# Patient Record
Sex: FEMALE | Race: WHITE | NOT HISPANIC OR LATINO | Employment: PART TIME | URBAN - METROPOLITAN AREA
[De-identification: names, ages, dates, MRNs, and addresses within clinical notes are randomized per-mention and may not be internally consistent; named-entity substitution may affect disease eponyms.]

---

## 2017-01-12 ENCOUNTER — TRANSCRIBE ORDERS (OUTPATIENT)
Dept: ADMINISTRATIVE | Facility: HOSPITAL | Age: 67
End: 2017-01-12

## 2017-01-12 DIAGNOSIS — Z12.31 VISIT FOR SCREENING MAMMOGRAM: ICD-10-CM

## 2017-01-12 DIAGNOSIS — Z78.0 POST-MENOPAUSE: Primary | ICD-10-CM

## 2017-03-22 ENCOUNTER — HOSPITAL ENCOUNTER (OUTPATIENT)
Dept: GENERAL RADIOLOGY | Facility: HOSPITAL | Age: 67
Discharge: HOME | End: 2017-03-22
Attending: INTERNAL MEDICINE | Admitting: INTERNAL MEDICINE

## 2017-03-22 ENCOUNTER — TRANSCRIBE ORDERS (OUTPATIENT)
Dept: ADMINISTRATIVE | Facility: HOSPITAL | Age: 67
End: 2017-03-22

## 2017-03-22 DIAGNOSIS — J02.9 PHARYNGITIS, UNSPECIFIED ETIOLOGY: Primary | ICD-10-CM

## 2017-03-22 PROCEDURE — 71020 HC CHEST PA AND LATERAL: CPT

## 2017-08-10 ENCOUNTER — APPOINTMENT (OUTPATIENT)
Dept: OTHER | Facility: HOSPITAL | Age: 67
End: 2017-08-10

## 2017-08-10 ENCOUNTER — APPOINTMENT (OUTPATIENT)
Dept: BONE DENSITY | Facility: HOSPITAL | Age: 67
End: 2017-08-10

## 2017-08-10 ENCOUNTER — HOSPITAL ENCOUNTER (OUTPATIENT)
Dept: MAMMOGRAPHY | Facility: HOSPITAL | Age: 67
Discharge: HOME | End: 2017-08-10
Admitting: PHYSICIAN ASSISTANT

## 2017-08-10 DIAGNOSIS — Z12.31 VISIT FOR SCREENING MAMMOGRAM: ICD-10-CM

## 2017-08-10 PROCEDURE — 77063 BREAST TOMOSYNTHESIS BI: CPT | Performed by: RADIOLOGY

## 2017-08-10 PROCEDURE — G0202 SCR MAMMO BI INCL CAD: HCPCS

## 2017-08-10 PROCEDURE — 77063 BREAST TOMOSYNTHESIS BI: CPT

## 2017-08-10 PROCEDURE — G0202 SCR MAMMO BI INCL CAD: HCPCS | Performed by: RADIOLOGY

## 2018-04-26 DIAGNOSIS — Z12.11 SCREENING FOR COLON CANCER: Primary | ICD-10-CM

## 2018-08-27 ENCOUNTER — TRANSCRIBE ORDERS (OUTPATIENT)
Dept: ADMINISTRATIVE | Facility: HOSPITAL | Age: 68
End: 2018-08-27

## 2018-08-27 DIAGNOSIS — N95.9 MENOPAUSAL AND POSTMENOPAUSAL DISORDER: Primary | ICD-10-CM

## 2018-09-20 ENCOUNTER — TRANSCRIBE ORDERS (OUTPATIENT)
Dept: ADMINISTRATIVE | Facility: HOSPITAL | Age: 68
End: 2018-09-20

## 2018-09-20 DIAGNOSIS — R10.11 ABDOMINAL PAIN, RIGHT UPPER QUADRANT: Primary | ICD-10-CM

## 2018-09-21 ENCOUNTER — HOSPITAL ENCOUNTER (OUTPATIENT)
Dept: ULTRASOUND IMAGING | Facility: HOSPITAL | Age: 68
Discharge: HOME | End: 2018-09-21
Admitting: PHYSICIAN ASSISTANT

## 2018-09-21 DIAGNOSIS — R10.11 ABDOMINAL PAIN, RIGHT UPPER QUADRANT: ICD-10-CM

## 2018-09-21 PROCEDURE — 76700 US EXAM ABDOM COMPLETE: CPT

## 2019-01-04 RX ORDER — ESTRADIOL 0.04 MG/D
PATCH TRANSDERMAL
Qty: 4 PATCH | Refills: 5 | Status: SHIPPED | OUTPATIENT
Start: 2019-01-04 | End: 2019-04-02

## 2019-01-07 PROBLEM — E78.2 MIXED HYPERLIPIDEMIA: Status: ACTIVE | Noted: 2019-01-07

## 2019-01-07 PROBLEM — F43.29 STRESS AND ADJUSTMENT REACTION: Status: ACTIVE | Noted: 2019-01-07

## 2019-01-07 PROBLEM — E55.9 VITAMIN D DEFICIENCY: Status: ACTIVE | Noted: 2019-01-07

## 2019-01-07 PROBLEM — R53.83 OTHER FATIGUE: Status: ACTIVE | Noted: 2019-01-07

## 2019-01-07 PROBLEM — M25.552 HIP PAIN, BILATERAL: Status: ACTIVE | Noted: 2019-01-07

## 2019-01-07 PROBLEM — F51.01 PRIMARY INSOMNIA: Status: ACTIVE | Noted: 2019-01-07

## 2019-01-07 PROBLEM — M25.551 HIP PAIN, BILATERAL: Status: ACTIVE | Noted: 2019-01-07

## 2019-01-07 PROBLEM — F32.4 MAJOR DEPRESSIVE DISORDER WITH SINGLE EPISODE, IN PARTIAL REMISSION (HCC): Status: ACTIVE | Noted: 2019-01-07

## 2019-01-07 PROBLEM — F41.0 GENERALIZED ANXIETY DISORDER WITH PANIC ATTACKS: Status: ACTIVE | Noted: 2019-01-07

## 2019-01-07 PROBLEM — F98.8 ADD (ATTENTION DEFICIT DISORDER) WITHOUT HYPERACTIVITY: Status: ACTIVE | Noted: 2019-01-07

## 2019-01-07 PROBLEM — I10 BENIGN ESSENTIAL HYPERTENSION WITH TARGET BLOOD PRESSURE BELOW 140/90: Status: ACTIVE | Noted: 2019-01-07

## 2019-01-07 PROBLEM — M19.90 OSTEOARTHRITIS: Status: ACTIVE | Noted: 2019-01-07

## 2019-01-07 PROBLEM — F41.1 GENERALIZED ANXIETY DISORDER WITH PANIC ATTACKS: Status: ACTIVE | Noted: 2019-01-07

## 2019-01-15 RX ORDER — VENLAFAXINE HYDROCHLORIDE 75 MG/1
CAPSULE, EXTENDED RELEASE ORAL
Qty: 30 CAPSULE | Refills: 2 | Status: SHIPPED | OUTPATIENT
Start: 2019-01-15 | End: 2019-03-27

## 2019-02-22 RX ORDER — CLONAZEPAM 0.5 MG/1
0.5 TABLET ORAL NIGHTLY PRN
Qty: 30 TABLET | Refills: 2 | Status: SHIPPED | OUTPATIENT
Start: 2019-02-22 | End: 2019-05-16 | Stop reason: SDUPTHER

## 2019-03-01 PROBLEM — Z86.79 HISTORY OF CEREBROVASCULAR DISEASE: Status: ACTIVE | Noted: 2019-03-01

## 2019-03-01 PROBLEM — J30.9 ALLERGIC RHINITIS: Status: ACTIVE | Noted: 2019-03-01

## 2019-03-01 PROBLEM — D53.9 NUTRITIONAL ANEMIA: Status: ACTIVE | Noted: 2019-03-01

## 2019-03-01 PROBLEM — N95.9 MENOPAUSAL AND POSTMENOPAUSAL DISORDER: Status: ACTIVE | Noted: 2019-03-01

## 2019-03-25 RX ORDER — ERGOCALCIFEROL 1.25 MG/1
CAPSULE ORAL
Qty: 12 CAPSULE | Refills: 3 | Status: SHIPPED | OUTPATIENT
Start: 2019-03-25 | End: 2020-05-19

## 2019-03-27 ENCOUNTER — CLINICAL SUPPORT (OUTPATIENT)
Dept: INTERNAL MEDICINE | Facility: CLINIC | Age: 69
End: 2019-03-27

## 2019-03-27 ENCOUNTER — TELEPHONE (OUTPATIENT)
Dept: INTERNAL MEDICINE | Facility: CLINIC | Age: 69
End: 2019-03-27

## 2019-03-27 ENCOUNTER — OFFICE VISIT (OUTPATIENT)
Dept: INTERNAL MEDICINE | Facility: CLINIC | Age: 69
End: 2019-03-27

## 2019-03-27 VITALS
BODY MASS INDEX: 25.59 KG/M2 | HEART RATE: 62 BPM | WEIGHT: 144.4 LBS | HEIGHT: 63 IN | DIASTOLIC BLOOD PRESSURE: 70 MMHG | TEMPERATURE: 98.3 F | SYSTOLIC BLOOD PRESSURE: 130 MMHG

## 2019-03-27 DIAGNOSIS — Z12.39 BREAST CANCER SCREENING: ICD-10-CM

## 2019-03-27 DIAGNOSIS — Z79.899 LONG-TERM USE OF HIGH-RISK MEDICATION: ICD-10-CM

## 2019-03-27 DIAGNOSIS — E78.2 MIXED HYPERLIPIDEMIA: ICD-10-CM

## 2019-03-27 DIAGNOSIS — I10 BENIGN ESSENTIAL HYPERTENSION WITH TARGET BLOOD PRESSURE BELOW 140/90: ICD-10-CM

## 2019-03-27 DIAGNOSIS — Z78.0 POST-MENOPAUSAL: ICD-10-CM

## 2019-03-27 DIAGNOSIS — E55.9 VITAMIN D DEFICIENCY: ICD-10-CM

## 2019-03-27 DIAGNOSIS — Z00.00 MEDICARE ANNUAL WELLNESS VISIT, SUBSEQUENT: Primary | ICD-10-CM

## 2019-03-27 LAB
25(OH)D3 SERPL-MCNC: 60.7 NG/ML
ALBUMIN SERPL-MCNC: 4.3 G/DL (ref 3.2–4.8)
ALBUMIN/GLOB SERPL: 2 G/DL (ref 1.5–2.5)
ALP SERPL-CCNC: 61 U/L (ref 25–100)
ALT SERPL W P-5'-P-CCNC: 18 U/L (ref 7–40)
ANION GAP SERPL CALCULATED.3IONS-SCNC: 9 MMOL/L (ref 3–11)
ARTICHOKE IGE QN: 146 MG/DL (ref 0–130)
AST SERPL-CCNC: 19 U/L (ref 0–33)
BASOPHILS # BLD AUTO: 0.06 10*3/MM3 (ref 0–0.2)
BASOPHILS NFR BLD AUTO: 0.8 % (ref 0–1)
BILIRUB SERPL-MCNC: 0.6 MG/DL (ref 0.3–1.2)
BUN BLD-MCNC: 15 MG/DL (ref 9–23)
BUN/CREAT SERPL: 20.8 (ref 7–25)
CALCIUM SPEC-SCNC: 9.2 MG/DL (ref 8.7–10.4)
CHLORIDE SERPL-SCNC: 105 MMOL/L (ref 99–109)
CHOLEST SERPL-MCNC: 235 MG/DL (ref 0–200)
CO2 SERPL-SCNC: 26 MMOL/L (ref 20–31)
CREAT BLD-MCNC: 0.72 MG/DL (ref 0.6–1.3)
DEPRECATED RDW RBC AUTO: 47.7 FL (ref 37–54)
EOSINOPHIL # BLD AUTO: 0.23 10*3/MM3 (ref 0–0.3)
EOSINOPHIL NFR BLD AUTO: 3.1 % (ref 0–3)
ERYTHROCYTE [DISTWIDTH] IN BLOOD BY AUTOMATED COUNT: 14.4 % (ref 11.3–14.5)
GFR SERPL CREATININE-BSD FRML MDRD: 80 ML/MIN/1.73
GLOBULIN UR ELPH-MCNC: 2.1 GM/DL
GLUCOSE BLD-MCNC: 79 MG/DL (ref 70–100)
HCT VFR BLD AUTO: 42.9 % (ref 34.5–44)
HDLC SERPL-MCNC: 61 MG/DL (ref 40–60)
HGB BLD-MCNC: 13.8 G/DL (ref 11.5–15.5)
IMM GRANULOCYTES # BLD AUTO: 0.03 10*3/MM3 (ref 0–0.05)
IMM GRANULOCYTES NFR BLD AUTO: 0.4 % (ref 0–0.6)
LYMPHOCYTES # BLD AUTO: 2.21 10*3/MM3 (ref 0.6–4.8)
LYMPHOCYTES NFR BLD AUTO: 29.6 % (ref 24–44)
MCH RBC QN AUTO: 29.2 PG (ref 27–31)
MCHC RBC AUTO-ENTMCNC: 32.2 G/DL (ref 32–36)
MCV RBC AUTO: 90.7 FL (ref 80–99)
MONOCYTES # BLD AUTO: 0.7 10*3/MM3 (ref 0–1)
MONOCYTES NFR BLD AUTO: 9.4 % (ref 0–12)
NEUTROPHILS # BLD AUTO: 4.26 10*3/MM3 (ref 1.5–8.3)
NEUTROPHILS NFR BLD AUTO: 57.1 % (ref 41–71)
PLATELET # BLD AUTO: 350 10*3/MM3 (ref 150–450)
PMV BLD AUTO: 9.6 FL (ref 6–12)
POTASSIUM BLD-SCNC: 4.3 MMOL/L (ref 3.5–5.5)
PROT SERPL-MCNC: 6.4 G/DL (ref 5.7–8.2)
RBC # BLD AUTO: 4.73 10*6/MM3 (ref 3.89–5.14)
SODIUM BLD-SCNC: 140 MMOL/L (ref 132–146)
TRIGL SERPL-MCNC: 127 MG/DL (ref 0–150)
WBC NRBC COR # BLD: 7.46 10*3/MM3 (ref 3.5–10.8)

## 2019-03-27 PROCEDURE — 82570 ASSAY OF URINE CREATININE: CPT | Performed by: NURSE PRACTITIONER

## 2019-03-27 PROCEDURE — 82043 UR ALBUMIN QUANTITATIVE: CPT | Performed by: NURSE PRACTITIONER

## 2019-03-27 PROCEDURE — 36415 COLL VENOUS BLD VENIPUNCTURE: CPT | Performed by: INTERNAL MEDICINE

## 2019-03-27 PROCEDURE — G0439 PPPS, SUBSEQ VISIT: HCPCS | Performed by: NURSE PRACTITIONER

## 2019-03-27 PROCEDURE — 85025 COMPLETE CBC W/AUTO DIFF WBC: CPT | Performed by: NURSE PRACTITIONER

## 2019-03-27 PROCEDURE — 80307 DRUG TEST PRSMV CHEM ANLYZR: CPT | Performed by: NURSE PRACTITIONER

## 2019-03-27 PROCEDURE — 80053 COMPREHEN METABOLIC PANEL: CPT | Performed by: NURSE PRACTITIONER

## 2019-03-27 PROCEDURE — 80061 LIPID PANEL: CPT | Performed by: NURSE PRACTITIONER

## 2019-03-27 PROCEDURE — 82306 VITAMIN D 25 HYDROXY: CPT | Performed by: NURSE PRACTITIONER

## 2019-03-27 RX ORDER — FLUCONAZOLE 150 MG/1
TABLET ORAL
COMMUNITY
Start: 2017-12-07 | End: 2019-09-20 | Stop reason: SDUPTHER

## 2019-03-27 RX ORDER — VENLAFAXINE HYDROCHLORIDE 75 MG/1
75 CAPSULE, EXTENDED RELEASE ORAL DAILY
COMMUNITY
Start: 2018-01-15 | End: 2019-09-30

## 2019-03-27 RX ORDER — TEMAZEPAM 15 MG/1
CAPSULE ORAL
COMMUNITY
Start: 2017-11-27 | End: 2019-05-28 | Stop reason: SDUPTHER

## 2019-03-27 NOTE — PROGRESS NOTES
QUICK REFERENCE INFORMATION:  The ABCs of the Annual Wellness Visit    Subsequent Medicare Wellness Visit    HEALTH RISK ASSESSMENT    1950    Recent Hospitalizations:  No hospitalization(s) within the last year..        Current Medical Providers:  Patient Care Team:  Yi Edouard MD as PCP - General (Internal Medicine)  Yi Edouard MD as PCP - Claims Attributed  Pierre Carrneo MD as Consulting Physician (Gastroenterology)        Smoking Status:  Social History     Tobacco Use   Smoking Status Never Smoker   Smokeless Tobacco Never Used       Alcohol Consumption:  Social History     Substance and Sexual Activity   Alcohol Use No   • Frequency: Never       Depression Screen:   PHQ-2/PHQ-9 Depression Screening 3/27/2019   Little interest or pleasure in doing things 0   Feeling down, depressed, or hopeless 0   Total Score 0       Health Habits and Functional and Cognitive Screening:  Functional & Cognitive Status 3/27/2019   Do you have difficulty preparing food and eating? No   Do you have difficulty bathing yourself, getting dressed or grooming yourself? No   Do you have difficulty using the toilet? No   Do you have difficulty moving around from place to place? No   Do you have trouble with steps or getting out of a bed or a chair? No   In the past year have you fallen or experienced a near fall? No   Current Diet Well Balanced Diet   Dental Exam Up to date   Eye Exam Not up to date   Exercise (times per week) 1 times per week   Current Exercise Activities Include Walking   Do you need help using the phone?  No   Are you deaf or do you have serious difficulty hearing?  No   Do you need help with transportation? No   Do you need help shopping? No   Do you need help preparing meals?  No   Do you need help with housework?  No   Do you need help with laundry? No   Do you need help taking your medications? No   Do you need help managing money? No   Do you ever drive or ride in a car without wearing a seat  belt? No   Have you felt unusual stress, anger or loneliness in the last month? No   Who do you live with? Alone   If you need help, do you have trouble finding someone available to you? No   Have you been bothered in the last four weeks by sexual problems? No   Do you have difficulty concentrating, remembering or making decisions? No           Does the patient have evidence of cognitive impairment? No    Aspirin use counseling: Does not need ASA (and currently is not on it)      Recent Lab Results:  CMP:  Lab Results   Component Value Date    BUN 15 03/27/2019    CREATININE 0.72 03/27/2019    EGFRIFNONA 80 03/27/2019    BCR 20.8 03/27/2019     03/27/2019    K 4.3 03/27/2019    CO2 26.0 03/27/2019    CALCIUM 9.2 03/27/2019    ALBUMIN 4.30 03/27/2019    BILITOT 0.6 03/27/2019    ALKPHOS 61 03/27/2019    AST 19 03/27/2019    ALT 18 03/27/2019     HbA1c:  No results found for: HGBA1C  Microalbumin:  No results found for: MICROALBUR, POCMALB, POCCREAT  Lipid Panel  Lab Results   Component Value Date    CHOL 235 (H) 03/27/2019    TRIG 127 03/27/2019    HDL 61 (H) 03/27/2019     (H) 03/27/2019    AST 19 03/27/2019    ALT 18 03/27/2019       Visual Acuity:  No exam data present    Age-appropriate Screening Schedule:  Refer to the list below for future screening recommendations based on patient's age, sex and/or medical conditions. Orders for these recommended tests are listed in the plan section. The patient has been provided with a written plan.    Health Maintenance   Topic Date Due   • ZOSTER VACCINE (1 of 2) 07/13/1999   • INFLUENZA VACCINE  08/01/2018   • DXA SCAN  01/29/2019   • PNEUMOCOCCAL VACCINES (65+ LOW/MEDIUM RISK) (2 of 2 - PPSV23) 07/07/2019   • LIPID PANEL  07/30/2019   • MAMMOGRAM  08/10/2019   • COLONOSCOPY  04/01/2025   • TDAP/TD VACCINES (2 - Td) 02/13/2027        Subjective   History of Present Illness    Jen Cook is a 68 y.o. female who presents for an Subsequent Wellness Visit.    The  following portions of the patient's history were reviewed and updated as appropriate: allergies, current medications, past family history, past medical history, past social history, past surgical history and problem list.    Outpatient Medications Prior to Visit   Medication Sig Dispense Refill   • clonazePAM (KlonoPIN) 0.5 MG tablet Take 1 tablet by mouth At Night As Needed for Anxiety. 30 tablet 2   • estradiol (CLIMARA) 0.0375 MG/24HR APPLY 1 PATCH BY TRANSDERMAL ROUTE EVERY WEEK 4 patch 5   • fluconazole (DIFLUCAN) 150 MG tablet Take  by mouth.     • temazepam (RESTORIL) 15 MG capsule Take  by mouth.     • venlafaxine XR (EFFEXOR XR) 75 MG 24 hr capsule Take  by mouth.     • vitamin D (ERGOCALCIFEROL) 19976 units capsule capsule TAKE ONE CAPSULE BY MOUTH ONCE WEEKLY 12 capsule 3   • venlafaxine XR (EFFEXOR-XR) 75 MG 24 hr capsule TAKE ONE CAPSULE BY MOUTH EVERY MORNING 30 capsule 2   • albuterol (PROVENTIL HFA;VENTOLIN HFA) 108 (90 BASE) MCG/ACT inhaler Inhale 2 puffs Every 6 (Six) Hours As Needed for Wheezing. 1 inhaler 0   • Sod Picosulfate-Mag Ox-Cit Acd 10-3.5-12 MG-GM -GM/160ML solution Take 1 kit by mouth Take As Directed. Follow instructions that were mailed to your home. If you didn't receive these call [phone]. 2 bottle 0   • venlafaxine (EFFEXOR) 75 MG tablet Take 75 mg by mouth 1 (One) Time.       No facility-administered medications prior to visit.        Patient Active Problem List   Diagnosis   • Primary insomnia   • Generalized anxiety disorder with panic attacks   • Osteoarthritis   • Other fatigue   • Vitamin D deficiency   • Hip pain, bilateral   • Mixed hyperlipidemia   • ADD (attention deficit disorder) without hyperactivity   • Stress and adjustment reaction   • Major depressive disorder with single episode, in partial remission (CMS/[facility])   • Benign essential hypertension with target blood pressure below 140/90   • Vertigo   • Major depressive disorder with single episode   • Allergic  "rhinitis   • BMI 24.0-24.9, adult   • History of cerebrovascular disease   • Menopausal and postmenopausal disorder   • Nutritional anemia       Advance Care Planning:  Patient does not have an advance directive - information provided to the patient today    Identification of Risk Factors:  Risk factors include: Mammogram Screening is due; Screening Mammogram ordered.    Review of Systems   Constitutional: Negative for chills, fatigue and fever.   HENT: Negative for congestion and sinus pressure.    Respiratory: Negative for cough, chest tightness, shortness of breath and wheezing.    Cardiovascular: Negative for chest pain and palpitations.   Gastrointestinal: Negative for abdominal pain, blood in stool and constipation.   Neurological: Negative for dizziness, speech difficulty and headaches.   Psychiatric/Behavioral: Positive for decreased concentration. The patient is nervous/anxious.        Compared to one year ago, the patient feels her physical health is better.  Compared to one year ago, the patient feels her mental health is better.      Procedures     Vitals:    03/27/19 1303   BP: 130/70   BP Location: Right arm   Patient Position: Sitting   Cuff Size: Adult   Pulse: 62   Temp: 98.3 °F (36.8 °C)   TempSrc: Temporal   Weight: 65.5 kg (144 lb 6.4 oz)   Height: 160 cm (63\")   PainSc: 0-No pain       Patient's Body mass index is 25.58 kg/m². BMI is within normal parameters. No follow-up required..      Assessment/Plan   Patient Self-Management and Personalized Health Advice  The patient has been provided with information about: Advanced Directive/Living Will and preventive services including:   · none.    Visit Diagnoses:    ICD-10-CM ICD-9-CM   1. Medicare annual wellness visit, subsequent Z00.00 V70.0   2. Mixed hyperlipidemia E78.2 272.2   3. Vitamin D deficiency E55.9 268.9   4. Benign essential hypertension with target blood pressure below 140/90 I10 401.1   5. Long-term use of high-risk medication " Z79.899 V58.69       Orders Placed This Encounter   Procedures   • Comprehensive Metabolic Panel     Standing Status:   Future     Number of Occurrences:   1   • Lipid Panel     Standing Status:   Future     Number of Occurrences:   1   • Microalbumin / Creatinine Urine Ratio - Urine, Clean Catch     Standing Status:   Future     Number of Occurrences:   1     Standing Expiration Date:   3/27/2020   • Pain Management Profile (13 Drugs) Urine - Urine, Clean Catch     Standing Status:   Future     Number of Occurrences:   1     Standing Expiration Date:   3/27/2020   • Vitamin D 25 Hydroxy     Standing Status:   Future     Number of Occurrences:   1     Standing Expiration Date:   3/27/2020   • CBC & Differential     Standing Status:   Future     Number of Occurrences:   1     Order Specific Question:   Manual Differential     Answer:   No       Outpatient Encounter Medications as of 3/27/2019   Medication Sig Dispense Refill   • clonazePAM (KlonoPIN) 0.5 MG tablet Take 1 tablet by mouth At Night As Needed for Anxiety. 30 tablet 2   • estradiol (CLIMARA) 0.0375 MG/24HR APPLY 1 PATCH BY TRANSDERMAL ROUTE EVERY WEEK 4 patch 5   • fluconazole (DIFLUCAN) 150 MG tablet Take  by mouth.     • temazepam (RESTORIL) 15 MG capsule Take  by mouth.     • venlafaxine XR (EFFEXOR XR) 75 MG 24 hr capsule Take  by mouth.     • vitamin D (ERGOCALCIFEROL) 94126 units capsule capsule TAKE ONE CAPSULE BY MOUTH ONCE WEEKLY 12 capsule 3   • [DISCONTINUED] venlafaxine XR (EFFEXOR-XR) 75 MG 24 hr capsule TAKE ONE CAPSULE BY MOUTH EVERY MORNING 30 capsule 2   • [DISCONTINUED] albuterol (PROVENTIL HFA;VENTOLIN HFA) 108 (90 BASE) MCG/ACT inhaler Inhale 2 puffs Every 6 (Six) Hours As Needed for Wheezing. 1 inhaler 0   • [DISCONTINUED] Sod Picosulfate-Mag Ox-Cit Acd 10-3.5-12 MG-GM -GM/160ML solution Take 1 kit by mouth Take As Directed. Follow instructions that were mailed to your home. If you didn't receive these call [phone]. 2 bottle 0    • [DISCONTINUED] venlafaxine (EFFEXOR) 75 MG tablet Take 75 mg by mouth 1 (One) Time.       No facility-administered encounter medications on file as of 3/27/2019.        Reviewed use of high risk medication in the elderly: no  Reviewed for potential of harmful drug interactions in the elderly: no    Follow Up:  Return for Labs this visit, Annual physical, Next scheduled follow up.     An After Visit Summary and PPPS with all of these plans were given to the patient.

## 2019-03-27 NOTE — TELEPHONE ENCOUNTER
Reason for Call:   FASTING LAB ORDER FOR THIS AM , COMING IN AROUND 10:30 OR SO . WOULD LIKE LABS DRAWN PRIOR TO 12:30 APPT TODAY SINCE PT HAS BEEN FASTING SO LONG.   THANKS!

## 2019-03-27 NOTE — PATIENT INSTRUCTIONS
Due for colonoscopy, mammogram and bone density study.    Medicare Wellness  Personal Prevention Plan of Service     Date of Office Visit:  2019  Encounter Provider:  LLOYD Monroe  Place of Service:  [facility] INTERNAL MEDICINE  Patient Name: Jen Cook  :  1950    As part of the Medicare Wellness portion of your visit today, we are providing you with this personalized preventive plan of services (PPPS). This plan is based upon recommendations of the [place] Preventive Services Task Force (USPSTF) and the Advisory Committee on Immunization Practices (ACIP).    This lists the preventive care services that should be considered, and provides dates of when you are due. Items listed as completed are up-to-date and do not require any further intervention.    Health Maintenance   Topic Date Due   • ZOSTER VACCINE (1 of 2) 1999   • HEPATITIS C SCREENING  2018   • INFLUENZA VACCINE  2018   • DXA SCAN  2019   • PNEUMOCOCCAL VACCINES (65+ LOW/MEDIUM RISK) (2 of 2 - PPSV23) 2019   • LIPID PANEL  2019   • MAMMOGRAM  08/10/2019   • MEDICARE ANNUAL WELLNESS  2020   • COLONOSCOPY  2025   • TDAP/TD VACCINES (2 - Td) 2027       Orders Placed This Encounter   Procedures   • Comprehensive Metabolic Panel     Standing Status:   Future     Number of Occurrences:   1   • Lipid Panel     Standing Status:   Future     Number of Occurrences:   1   • Microalbumin / Creatinine Urine Ratio - Urine, Clean Catch     Standing Status:   Future     Number of Occurrences:   1     Standing Expiration Date:   3/27/2020   • Pain Management Profile (13 Drugs) Urine - Urine, Clean Catch     Standing Status:   Future     Number of Occurrences:   1     Standing Expiration Date:   3/27/2020   • Vitamin D 25 Hydroxy     Standing Status:   Future     Number of Occurrences:   1     Standing Expiration Date:   3/27/2020   • CBC & Differential     Standing  Status:   Future     Number of Occurrences:   1     Order Specific Question:   Manual Differential     Answer:   No       Return for Labs this visit, Annual physical, Next scheduled follow up.        Advance Directive  Advance directives are legal documents that let you make choices ahead of time about your health care and medical treatment in case you become unable to communicate for yourself. Advance directives are a way for you to communicate your wishes to family, friends, and health care providers. This can help convey your decisions about end-of-life care if you become unable to communicate.  Discussing and writing advance directives should happen over time rather than all at once. Advance directives can be changed depending on your situation and what you want, even after you have signed the advance directives.  If you do not have an advance directive, some states assign family decision makers to act on your behalf based on how closely you are related to them. Each state has its own laws regarding advance directives. You may want to check with your health care provider, , or state representative about the laws in your state. There are different types of advance directives, such as:  · Medical power of .  · Living will.  · Do not resuscitate (DNR) or do not attempt resuscitation (DNAR) order.    Health care proxy and medical power of   A health care proxy, also called a health care agent, is a person who is appointed to make medical decisions for you in cases in which you are unable to make the decisions yourself. Generally, people choose someone they know well and trust to represent their preferences. Make sure to ask this person for an agreement to act as your proxy. A proxy may have to exercise judgment in the event of a medical decision for which your wishes are not known.  A medical power of  is a legal document that names your health care proxy. Depending on the laws in  your state, after the document is written, it may also need to be:  · Signed.  · Notarized.  · Dated.  · Copied.  · Witnessed.  · Incorporated into your medical record.    You may also want to appoint someone to manage your financial affairs in a situation in which you are unable to do so. This is called a durable power of  for finances. It is a separate legal document from the durable power of  for health care. You may choose the same person or someone different from your health care proxy to act as your agent in financial matters.  If you do not appoint a proxy, or if there is a concern that the proxy is not acting in your best interests, a court-appointed guardian may be designated to act on your behalf.  Living will  A living will is a set of instructions documenting your wishes about medical care when you cannot express them yourself. Health care providers should keep a copy of your living will in your medical record. You may want to give a copy to family members or friends. To alert caregivers in case of an emergency, you can place a card in your wallet to let them know that you have a living will and where they can find it. A living will is used if you become:  · Terminally ill.  · Incapacitated.  · Unable to communicate or make decisions.    Items to consider in your living will include:  · The use or non-use of life-sustaining equipment, such as dialysis machines and breathing machines (ventilators).  · A DNR or DNAR order, which is the instruction not to use cardiopulmonary resuscitation (CPR) if breathing or heartbeat stops.  · The use or non-use of tube feeding.  · Withholding of food and fluids.  · Comfort (palliative) care when the goal becomes comfort rather than a cure.  · Organ and tissue donation.    A living will does not give instructions for distributing your money and property if you should pass away. It is recommended that you seek the advice of a  when writing a will.  Decisions about taxes, beneficiaries, and asset distribution will be legally binding. This process can relieve your family and friends of any concerns surrounding disputes or questions that may come up about the distribution of your assets.  DNR or DNAR  A DNR or DNAR order is a request not to have CPR in the event that your heart stops beating or you stop breathing. If a DNR or DNAR order has not been made and shared, a health care provider will try to help any patient whose heart has stopped or who has stopped breathing. If you plan to have surgery, talk with your health care provider about how your DNR or DNAR order will be followed if problems occur.  Summary  · Advance directives are the legal documents that allow you to make choices ahead of time about your health care and medical treatment in case you become unable to communicate for yourself.  · The process of discussing and writing advance directives should happen over time. You can change the advance directives, even after you have signed them.  · Advance directives include DNR or DNAR orders, living lima, and designating an agent as your medical power of .  This information is not intended to replace advice given to you by your health care provider. Make sure you discuss any questions you have with your health care provider.  Document Released: 03/26/2009 Document Revised: 11/06/2017 Document Reviewed: 11/06/2017  [employer] Interactive Patient Education © 2019 [employer]

## 2019-03-28 LAB
CREAT 24H UR-MCNC: 36.2 MG/DL
MICROALBUMIN UR-MCNC: <3 UG/ML
MICROALBUMIN/CREAT UR: <8.3 MG/G CREAT (ref 0–30)

## 2019-04-02 ENCOUNTER — OFFICE VISIT (OUTPATIENT)
Dept: INTERNAL MEDICINE | Facility: CLINIC | Age: 69
End: 2019-04-02

## 2019-04-02 VITALS
HEIGHT: 63 IN | SYSTOLIC BLOOD PRESSURE: 132 MMHG | BODY MASS INDEX: 25.59 KG/M2 | WEIGHT: 144.4 LBS | DIASTOLIC BLOOD PRESSURE: 74 MMHG | HEART RATE: 72 BPM

## 2019-04-02 DIAGNOSIS — M25.552 HIP PAIN, BILATERAL: ICD-10-CM

## 2019-04-02 DIAGNOSIS — F41.1 GENERALIZED ANXIETY DISORDER: ICD-10-CM

## 2019-04-02 DIAGNOSIS — K62.89 RECTAL PAIN: ICD-10-CM

## 2019-04-02 DIAGNOSIS — F98.8 ADD (ATTENTION DEFICIT DISORDER) WITHOUT HYPERACTIVITY: ICD-10-CM

## 2019-04-02 DIAGNOSIS — M25.551 HIP PAIN, BILATERAL: ICD-10-CM

## 2019-04-02 DIAGNOSIS — F32.4 MAJOR DEPRESSIVE DISORDER WITH SINGLE EPISODE, IN PARTIAL REMISSION (HCC): ICD-10-CM

## 2019-04-02 DIAGNOSIS — N95.9 MENOPAUSAL AND POSTMENOPAUSAL DISORDER: ICD-10-CM

## 2019-04-02 DIAGNOSIS — H81.13 BENIGN PAROXYSMAL POSITIONAL VERTIGO DUE TO BILATERAL VESTIBULAR DISORDER: ICD-10-CM

## 2019-04-02 DIAGNOSIS — E55.9 VITAMIN D DEFICIENCY: ICD-10-CM

## 2019-04-02 DIAGNOSIS — I10 MILD ESSENTIAL HYPERTENSION: ICD-10-CM

## 2019-04-02 DIAGNOSIS — F51.01 PRIMARY INSOMNIA: ICD-10-CM

## 2019-04-02 DIAGNOSIS — E78.2 MIXED HYPERLIPIDEMIA: Primary | ICD-10-CM

## 2019-04-02 PROBLEM — F41.0 GENERALIZED ANXIETY DISORDER WITH PANIC ATTACKS: Status: RESOLVED | Noted: 2019-01-07 | Resolved: 2019-04-02

## 2019-04-02 PROCEDURE — 99214 OFFICE O/P EST MOD 30 MIN: CPT | Performed by: INTERNAL MEDICINE

## 2019-04-02 PROCEDURE — 93000 ELECTROCARDIOGRAM COMPLETE: CPT | Performed by: INTERNAL MEDICINE

## 2019-04-02 NOTE — PATIENT INSTRUCTIONS
We reviewed her improvement in the LDL (bad cholesterol) to 146.  Goal is less than 100.  Continue working on the low-fat and low sugar diet and getting plenty of exercise and walking.    Blood pressures at home and here are acceptable.  Continue to avoid salt in the diet and get plenty of walking and exercise.  Continue to monitor at home.    The rectal pain is mild and described by the patient as a pressure.  It is most likely due to internal hemorrhoids and being on her feet all day at work.  Drink plenty of fluids to keep the stool soft and moving well.  If the pressure gets worse or you notice any bleeding let us know.    For depression and anxiety, continue taking venlafaxine daily.  Continue clonazepam as needed.  Continue working and being physically active.  Also continue to plan something fun to do with friends.    For insomnia, continue temazepam at night as needed.  Physical activity during the day also helps her sleep at night.  Turn off the TV, computer, phone, iPad at least an hour before bedtime.  Relax and read or listen to soft music close to bedtime.    For hip discomfort, continue walking and stretching every day.  Let us know if you are having any trouble.    She has not had any menopausal symptoms thus far off of her estrogen patch for about a week.  Let us know if you have trouble with vaginal dryness.    Let us know if you have another episode of the vertigo.  Seeing the physical therapist for [place] maneuvers helps as you know.  Exercising to Stay Healthy  Exercising regularly is important. It has many health benefits, such as:  · Improving your overall fitness, flexibility, and endurance.  · Increasing your bone density.  · Helping with weight control.  · Decreasing your body fat.  · Increasing your muscle strength.  · Reducing stress and tension.  · Improving your overall health.    In order to become healthy and stay healthy, it is recommended that you do moderate-intensity and  vigorous-intensity exercise. You can tell that you are exercising at a moderate intensity if you have a higher heart rate and faster breathing, but you are still able to hold a conversation. You can tell that you are exercising at a vigorous intensity if you are breathing much harder and faster and cannot hold a conversation while exercising.  How often should I exercise?  Choose an activity that you enjoy and set realistic goals. Your health care provider can help you to make an activity plan that works for you. Exercise regularly as directed by your health care provider. This may include:  · Doing resistance training twice each week, such as:  ? Push-ups.  ? Sit-ups.  ? Lifting weights.  ? Using resistance bands.  · Doing a given intensity of exercise for a given amount of time. Choose from these options:  ? 150 minutes of moderate-intensity exercise every week.  ? 75 minutes of vigorous-intensity exercise every week.  ? A mix of moderate-intensity and vigorous-intensity exercise every week.    Children, pregnant women, people who are out of shape, people who are overweight, and older adults may need to consult a health care provider for individual recommendations. If you have any sort of medical condition, be sure to consult your health care provider before starting a new exercise program.  What are some exercise ideas?  Some moderate-intensity exercise ideas include:  · Walking at a rate of 1 mile in 15 minutes.  · Biking.  · Hiking.  · Golfing.  · Dancing.    Some vigorous-intensity exercise ideas include:  · Walking at a rate of at least 4.5 miles per hour.  · Jogging or running at a rate of 5 miles per hour.  · Biking at a rate of at least 10 miles per hour.  · Lap swimming.  · Roller-skating or in-line skating.  · Cross-country skiing.  · Vigorous competitive sports, such as football, basketball, and soccer.  · Jumping rope.  · Aerobic dancing.    What are some everyday activities that can help me to get  exercise?  · Yard work, such as:  ? Pushing a .  ? Raking and bagging leaves.  · Washing and waxing your car.  · Pushing a stroller.  · Shoveling snow.  · Gardening.  · Washing windows or floors.  How can I be more active in my day-to-day activities?  · Use the stairs instead of the elevator.  · Take a walk during your lunch break.  · If you drive, park your car farther away from work or school.  · If you take public transportation, get off one stop early and walk the rest of the way.  · Make all of your phone calls while standing up and walking around.  · Get up, stretch, and walk around every 30 minutes throughout the day.  What guidelines should I follow while exercising?  · Do not exercise so much that you hurt yourself, feel dizzy, or get very short of breath.  · Consult your health care provider before starting a new exercise program.  · Wear comfortable clothes and shoes with good support.  · Drink plenty of water while you exercise to prevent dehydration or heat stroke. Body water is lost during exercise and must be replaced.  · Work out until you breathe faster and your heart beats faster.  This information is not intended to replace advice given to you by your health care provider. Make sure you discuss any questions you have with your health care provider.  Document Released: 01/20/2012 Document Revised: 05/25/2017 Document Reviewed: 05/21/2015  [employer] Interactive Patient Education © 2018 [employer]    Living With Anxiety  After being diagnosed with an anxiety disorder, you may be relieved to know why you have felt or behaved a certain way. It is natural to also feel overwhelmed about the treatment ahead and what it will mean for your life. With care and support, you can manage this condition and recover from it.  How to cope with anxiety  Dealing with stress  Stress is your body’s reaction to life changes and events, both good and bad. Stress can last just a few hours or it can be ongoing.  Stress can play a major role in anxiety, so it is important to learn both how to cope with stress and how to think about it differently.  Talk with your health care provider or a counselor to learn more about stress reduction. He or she may suggest some stress reduction techniques, such as:  · Music therapy. This can include creating or listening to music that you enjoy and that inspires you.  · Mindfulness-based meditation. This involves being aware of your normal breaths, rather than trying to control your breathing. It can be done while sitting or walking.  · Centering prayer. This is a kind of meditation that involves focusing on a word, phrase, or sacred image that is meaningful to you and that brings you peace.  · Deep breathing. To do this, expand your stomach and inhale slowly through your nose. Hold your breath for 3-5 seconds. Then exhale slowly, allowing your stomach muscles to relax.  · Self-talk. This is a skill where you identify thought patterns that lead to anxiety reactions and correct those thoughts.  · Muscle relaxation. This involves tensing muscles then relaxing them.    Choose a stress reduction technique that fits your lifestyle and personality. Stress reduction techniques take time and practice. Set aside 5-15 minutes a day to do them. Therapists can offer training in these techniques. The training may be covered by some insurance plans. Other things you can do to manage stress include:  · Keeping a stress diary. This can help you learn what triggers your stress and ways to control your response.  · Thinking about how you respond to certain situations. You may not be able to control everything, but you can control your reaction.  · Making time for activities that help you relax, and not feeling guilty about spending your time in this way.    Therapy combined with coping and stress-reduction skills provides the best chance for successful treatment.  Medicines  Medicines can help ease symptoms.  Medicines for anxiety include:  · Anti-anxiety drugs.  · Antidepressants.  · Beta-blockers.    Medicines may be used as the main treatment for anxiety disorder, along with therapy, or if other treatments are not working. Medicines should be prescribed by a health care provider.  Relationships  Relationships can play a big part in helping you recover. Try to spend more time connecting with trusted friends and family members. Consider going to couples counseling, taking family education classes, or going to family therapy. Therapy can help you and others better understand the condition.  How to recognize changes in your condition  Everyone has a different response to treatment for anxiety. Recovery from anxiety happens when symptoms decrease and stop interfering with your daily activities at home or work. This may mean that you will start to:  · Have better concentration and focus.  · Sleep better.  · Be less irritable.  · Have more energy.  · Have improved memory.    It is important to recognize when your condition is getting worse. Contact your health care provider if your symptoms interfere with home or work and you do not feel like your condition is improving.  Where to find help and support:  You can get help and support from these sources:  · Self-help groups.  · Online and community organizations.  · A trusted spiritual leader.  · Couples counseling.  · Family education classes.  · Family therapy.    Follow these instructions at home:  · Eat a healthy diet that includes plenty of vegetables, fruits, whole grains, low-fat dairy products, and lean protein. Do not eat a lot of foods that are high in solid fats, added sugars, or salt.  · Exercise. Most adults should do the following:  ? Exercise for at least 150 minutes each week. The exercise should increase your heart rate and make you sweat (moderate-intensity exercise).  ? Strengthening exercises at least twice a week.  · Cut down on caffeine, tobacco, alcohol,  and other potentially harmful substances.  · Get the right amount and quality of sleep. Most adults need 7-9 hours of sleep each night.  · Make choices that simplify your life.  · Take over-the-counter and prescription medicines only as told by your health care provider.  · Avoid caffeine, alcohol, and certain over-the-counter cold medicines. These may make you feel worse. Ask your pharmacist which medicines to avoid.  · Keep all follow-up visits as told by your health care provider. This is important.  Questions to ask your health care provider  · Would I benefit from therapy?  · How often should I follow up with a health care provider?  · How long do I need to take medicine?  · Are there any long-term side effects of my medicine?  · Are there any alternatives to taking medicine?  Contact a health care provider if:  · You have a hard time staying focused or finishing daily tasks.  · You spend many hours a day feeling worried about everyday life.  · You become exhausted by worry.  · You start to have headaches, feel tense, or have nausea.  · You urinate more than normal.  · You have diarrhea.  Get help right away if:  · You have a racing heart and shortness of breath.  · You have thoughts of hurting yourself or others.  If you ever feel like you may hurt yourself or others, or have thoughts about taking your own life, get help right away. You can go to your nearest emergency department or call:  · Your local emergency services (911 in the [place]  · A suicide crisis helpline, such as the National Suicide Prevention Lifeline at 1-976.918.5811. This is open 24-hours a day.    Summary  · Taking steps to deal with stress can help calm you.  · Medicines cannot cure anxiety disorders, but they can help ease symptoms.  · Family, friends, and partners can play a big part in helping you recover from an anxiety disorder.  This information is not intended to replace advice given to you by your health care provider. Make sure  you discuss any questions you have with your health care provider.  Document Released: 12/12/2017 Document Revised: 12/12/2017 Document Reviewed: 12/12/2017  [employer] Interactive Patient Education © 2019 [employer]

## 2019-04-02 NOTE — PROGRESS NOTES
"[facility]     Jen B May  1950   [id number]      Patient Care Team:  Yi Edouard MD as PCP - General (Internal Medicine)  Yi Edouard MD as PCP - Claims Attributed  Pierre Carreno MD as Consulting Physician (Gastroenterology)    Chief Complaint;:   Chief Complaint   Patient presents with   • Hyperlipidemia     follow-up   • Hypertension   • Annual Exam            HPI  Patient is a 68 y.o. female presents with pressure in rectum. Onset of symptoms was gradual starting 3 months ago.  Chronicity chronic. Severity mild .  Symptoms are associated with working on her feet all day 4 days a week. Pertinent negatives no pain or irritation.   Symptoms are aggravated by being on her feet.   Symptoms improve with nothing.  Context no constipation and only rare diarrhea.    CHRONIC CONDITIONS  Hypertension-mild.  125 to 135/70 to 80. Checks it frequently at home. Avoids salt.     Anxiety and depression  helped by venlafaxine. Happy with her dose. Uses clonazepam most nites to relax. Some anxiety times during the day but no attacks with fast heart beat.  \"I am able to continue working.\" She feels she is able to calm herself.    Eats low fat diet and very active every day, even when not at work.On statin in the past and had a lot of muscle pain and cramping. LDL and triglycerides have now improved since she is watching her diet closely and eating less. Also avoids sweets.    Insomnia is helped by temazepam.  She still feels that she needs it every evening.  She is sleeping well with it.  She does not feel sleepy or hung over the next morning.  No side effects.      Mild hip pain is stable.  Very mild.  Intermittent.  Doesn't keep her from walking and being active.  Never needs to take anything for it.    She is taking her vitamin D regularly.    One episode of vertigo which resolved immediately when she saw a physical therapist for Epley maneuver.    She ran out of her estrogen patches about a " week ago and decided to try without it since they are expensive.  So far no hot flashes or night sweats.    Past Medical History:   Diagnosis Date   • Anxiety    • Benign essential hypertension with target blood pressure below 140/90    • Depression    • Generalized anxiety disorder with panic attacks    • Generalized anxiety disorder with panic attacks 1/7/2019   • Major depressive disorder with single episode    • Mixed hyperlipidemia    • Primary insomnia    • Stroke (CMS/[facility]) 2008    R hemispheric Stroke   • Vertigo    • Vitamin D deficiency        Past Surgical History:   Procedure Laterality Date   • AUGMENTATION MAMMAPLASTY  1990   • BREAST AUGMENTATION Bilateral 1989   • HYSTERECTOMY  1995    age 41   • OOPHORECTOMY     • PARTIAL KNEE ARTHROPLASTY  2015    Partial knee replacement 2 years ago       History reviewed. No pertinent family history.    Social History     Socioeconomic History   • Marital status:      Spouse name: Not on file   • Number of children: Not on file   • Years of education: Not on file   • Highest education level: Not on file   Tobacco Use   • Smoking status: Never Smoker   • Smokeless tobacco: Never Used   Substance and Sexual Activity   • Alcohol use: No     Frequency: Never   • Drug use: No   • Sexual activity: Defer       No Known Allergies    Review of Systems:     Review of Systems   Constitutional: Negative for appetite change, chills, diaphoresis, fatigue, fever and unexpected weight gain.   HENT: Negative for congestion, ear pain, hearing loss, nosebleeds, rhinorrhea, sore throat, trouble swallowing and voice change.    Eyes: Negative for pain, redness, itching and visual disturbance.   Respiratory: Negative for cough, shortness of breath and wheezing.    Cardiovascular: Negative for chest pain, palpitations and leg swelling.   Gastrointestinal: Positive for rectal pain. Negative for abdominal pain, anal bleeding, blood in stool, constipation, diarrhea, nausea, vomiting  "and GERD.   Endocrine: Negative for cold intolerance and heat intolerance.   Genitourinary: Negative for urinary incontinence, dysuria, frequency, hematuria and urgency.   Musculoskeletal: Positive for arthralgias. Negative for back pain, gait problem, joint swelling and myalgias.   Skin: Negative for color change and rash.   Neurological: Negative for dizziness, seizures, syncope, weakness, light-headedness and numbness.   Hematological: Negative for adenopathy. Does not bruise/bleed easily.   Psychiatric/Behavioral: Negative for behavioral problems, sleep disturbance, suicidal ideas and depressed mood. The patient is not nervous/anxious.        Vital Signs  Vitals:    04/02/19 1322   BP: 132/74   BP Location: Left arm   Patient Position: Sitting   Cuff Size: Adult   Pulse: 72   Weight: 65.5 kg (144 lb 6.4 oz)   Height: 160 cm (63\")     Body mass index is 25.58 kg/m².      Current Outpatient Medications:   •  clonazePAM (KlonoPIN) 0.5 MG tablet, Take 1 tablet by mouth At Night As Needed for Anxiety., Disp: 30 tablet, Rfl: 2  •  fluconazole (DIFLUCAN) 150 MG tablet, Take  by mouth., Disp: , Rfl:   •  temazepam (RESTORIL) 15 MG capsule, Take  by mouth., Disp: , Rfl:   •  venlafaxine XR (EFFEXOR XR) 75 MG 24 hr capsule, Take  by mouth., Disp: , Rfl:   •  vitamin D (ERGOCALCIFEROL) 54688 units capsule capsule, TAKE ONE CAPSULE BY MOUTH ONCE WEEKLY, Disp: 12 capsule, Rfl: 3      Physical Exam:    Physical Exam   Constitutional: She is oriented to person, place, and time. She appears well-developed and well-nourished.   Eyes: Conjunctivae and EOM are normal. Pupils are equal, round, and reactive to light.   Neck: Normal range of motion. Neck supple. No thyromegaly present.   Cardiovascular: Normal rate, regular rhythm, normal heart sounds and intact distal pulses.   No murmur heard.  Pulmonary/Chest: Effort normal and breath sounds normal. She has no wheezes. Right breast exhibits no inverted nipple, no mass, no nipple " discharge, no skin change and no tenderness. Left breast exhibits no inverted nipple, no mass, no nipple discharge, no skin change and no tenderness.   Soft bilateral breast implants.   Abdominal: Soft. Bowel sounds are normal. She exhibits no distension and no mass. There is no tenderness.   Musculoskeletal: Normal range of motion. She exhibits no edema or tenderness.   Lymphadenopathy:     She has no cervical adenopathy.   Neurological: She is alert and oriented to person, place, and time. She has normal strength. No cranial nerve deficit or sensory deficit. Coordination and gait normal.   Skin: Skin is warm and dry. No rash noted.   Psychiatric: She has a normal mood and affect. Her speech is normal and behavior is normal. Judgment and thought content normal. Cognition and memory are normal.   Nursing note and vitals reviewed.          ECG 12 Lead  Date/Time: 4/2/2019 2:44 PM  Performed by: Yi Edouard MD  Authorized by: Yi Edouard MD   Comparison: compared with previous ECG from 4/2/2018  Similar to previous ECG  Rhythm: sinus rhythm  Rate: normal  BPM: 71  Conduction: conduction normal  ST Segments: ST segments normal  T Waves: T waves normal  QRS axis: left    Clinical impression: non-specific ECG           Results Review:    I reviewed the patient's new clinical results.  We discussed her labs in detail.    CMP:  Lab Results   Component Value Date    BUN 15 03/27/2019    CREATININE 0.72 03/27/2019    EGFRIFNONA 80 03/27/2019    BCR 20.8 03/27/2019     03/27/2019    K 4.3 03/27/2019    CO2 26.0 03/27/2019    CALCIUM 9.2 03/27/2019    ALBUMIN 4.30 03/27/2019    BILITOT 0.6 03/27/2019    ALKPHOS 61 03/27/2019    AST 19 03/27/2019    ALT 18 03/27/2019     HbA1c:  No results found for: HGBA1C  Microalbumin:  Lab Results   Component Value Date    MICROALBUR <3.0 03/27/2019     Lipid Panel  Lab Results   Component Value Date    CHOL 235 (H) 03/27/2019    TRIG 127 03/27/2019    HDL 61 (H)  03/27/2019     (H) 03/27/2019    AST 19 03/27/2019    ALT 18 03/27/2019       Medication Review: Medications reviewed and noted    Assessment/Plan:    Jen was seen today for hyperlipidemia, hypertension and annual exam.    Diagnoses and all orders for this visit:    Mixed hyperlipidemia    Mild essential hypertension  -     ECG 12 Lead    Rectal pain    Major depressive disorder with single episode, in partial remission (CMS/HCC)    Generalized anxiety disorder    Primary insomnia    Hip pain, bilateral    Vitamin D deficiency    Menopausal and postmenopausal disorder    Benign paroxysmal positional vertigo due to bilateral vestibular disorder    ADD (attention deficit disorder) without hyperactivity        Patient Instructions   We reviewed her improvement in the LDL (bad cholesterol) to 146.  Goal is less than 100.  Continue working on the low-fat and low sugar diet and getting plenty of exercise and walking.    Blood pressures at home and here are acceptable.  Continue to avoid salt in the diet and get plenty of walking and exercise.  Continue to monitor at home.    The rectal pain is mild and described by the patient as a pressure.  It is most likely due to internal hemorrhoids and being on her feet all day at work.  Drink plenty of fluids to keep the stool soft and moving well.  If the pressure gets worse or you notice any bleeding let us know.    For depression and anxiety, continue taking venlafaxine daily.  Continue clonazepam as needed.  Continue working and being physically active.  Also continue to plan something fun to do with friends.    For insomnia, continue temazepam at night as needed.  Physical activity during the day also helps her sleep at night.  Turn off the TV, computer, phone, iPad at least an hour before bedtime.  Relax and read or listen to soft music close to bedtime.    For hip discomfort, continue walking and stretching every day.  Let us know if you are having any  trouble.    She has not had any menopausal symptoms thus far off of her estrogen patch for about a week.  Let us know if you have trouble with vaginal dryness.    Let us know if you have another episode of the vertigo.  Seeing the physical therapist for Epley maneuvers helps as you know.  Exercising to Stay Healthy  Exercising regularly is important. It has many health benefits, such as:  · Improving your overall fitness, flexibility, and endurance.  · Increasing your bone density.  · Helping with weight control.  · Decreasing your body fat.  · Increasing your muscle strength.  · Reducing stress and tension.  · Improving your overall health.    In order to become healthy and stay healthy, it is recommended that you do moderate-intensity and vigorous-intensity exercise. You can tell that you are exercising at a moderate intensity if you have a higher heart rate and faster breathing, but you are still able to hold a conversation. You can tell that you are exercising at a vigorous intensity if you are breathing much harder and faster and cannot hold a conversation while exercising.  How often should I exercise?  Choose an activity that you enjoy and set realistic goals. Your health care provider can help you to make an activity plan that works for you. Exercise regularly as directed by your health care provider. This may include:  · Doing resistance training twice each week, such as:  ? Push-ups.  ? Sit-ups.  ? Lifting weights.  ? Using resistance bands.  · Doing a given intensity of exercise for a given amount of time. Choose from these options:  ? 150 minutes of moderate-intensity exercise every week.  ? 75 minutes of vigorous-intensity exercise every week.  ? A mix of moderate-intensity and vigorous-intensity exercise every week.    Children, pregnant women, people who are out of shape, people who are overweight, and older adults may need to consult a health care provider for individual recommendations. If you have  any sort of medical condition, be sure to consult your health care provider before starting a new exercise program.  What are some exercise ideas?  Some moderate-intensity exercise ideas include:  · Walking at a rate of 1 mile in 15 minutes.  · Biking.  · Hiking.  · Golfing.  · Dancing.    Some vigorous-intensity exercise ideas include:  · Walking at a rate of at least 4.5 miles per hour.  · Jogging or running at a rate of 5 miles per hour.  · Biking at a rate of at least 10 miles per hour.  · Lap swimming.  · Roller-skating or in-line skating.  · Cross-country skiing.  · Vigorous competitive sports, such as football, basketball, and soccer.  · Jumping rope.  · Aerobic dancing.    What are some everyday activities that can help me to get exercise?  · Yard work, such as:  ? Pushing a .  ? Raking and bagging leaves.  · Washing and waxing your car.  · Pushing a stroller.  · Shoveling snow.  · Gardening.  · Washing windows or floors.  How can I be more active in my day-to-day activities?  · Use the stairs instead of the elevator.  · Take a walk during your lunch break.  · If you drive, park your car farther away from work or school.  · If you take public transportation, get off one stop early and walk the rest of the way.  · Make all of your phone calls while standing up and walking around.  · Get up, stretch, and walk around every 30 minutes throughout the day.  What guidelines should I follow while exercising?  · Do not exercise so much that you hurt yourself, feel dizzy, or get very short of breath.  · Consult your health care provider before starting a new exercise program.  · Wear comfortable clothes and shoes with good support.  · Drink plenty of water while you exercise to prevent dehydration or heat stroke. Body water is lost during exercise and must be replaced.  · Work out until you breathe faster and your heart beats faster.  This information is not intended to replace advice given to you by your  health care provider. Make sure you discuss any questions you have with your health care provider.  Document Released: 01/20/2012 Document Revised: 05/25/2017 Document Reviewed: 05/21/2015  [employer] Interactive Patient Education © 2018 [employer]    Living With Anxiety  After being diagnosed with an anxiety disorder, you may be relieved to know why you have felt or behaved a certain way. It is natural to also feel overwhelmed about the treatment ahead and what it will mean for your life. With care and support, you can manage this condition and recover from it.  How to cope with anxiety  Dealing with stress  Stress is your body’s reaction to life changes and events, both good and bad. Stress can last just a few hours or it can be ongoing. Stress can play a major role in anxiety, so it is important to learn both how to cope with stress and how to think about it differently.  Talk with your health care provider or a counselor to learn more about stress reduction. He or she may suggest some stress reduction techniques, such as:  · Music therapy. This can include creating or listening to music that you enjoy and that inspires you.  · Mindfulness-based meditation. This involves being aware of your normal breaths, rather than trying to control your breathing. It can be done while sitting or walking.  · Centering prayer. This is a kind of meditation that involves focusing on a word, phrase, or sacred image that is meaningful to you and that brings you peace.  · Deep breathing. To do this, expand your stomach and inhale slowly through your nose. Hold your breath for 3-5 seconds. Then exhale slowly, allowing your stomach muscles to relax.  · Self-talk. This is a skill where you identify thought patterns that lead to anxiety reactions and correct those thoughts.  · Muscle relaxation. This involves tensing muscles then relaxing them.    Choose a stress reduction technique that fits your lifestyle and personality. Stress  reduction techniques take time and practice. Set aside 5-15 minutes a day to do them. Therapists can offer training in these techniques. The training may be covered by some insurance plans. Other things you can do to manage stress include:  · Keeping a stress diary. This can help you learn what triggers your stress and ways to control your response.  · Thinking about how you respond to certain situations. You may not be able to control everything, but you can control your reaction.  · Making time for activities that help you relax, and not feeling guilty about spending your time in this way.    Therapy combined with coping and stress-reduction skills provides the best chance for successful treatment.  Medicines  Medicines can help ease symptoms. Medicines for anxiety include:  · Anti-anxiety drugs.  · Antidepressants.  · Beta-blockers.    Medicines may be used as the main treatment for anxiety disorder, along with therapy, or if other treatments are not working. Medicines should be prescribed by a health care provider.  Relationships  Relationships can play a big part in helping you recover. Try to spend more time connecting with trusted friends and family members. Consider going to couples counseling, taking family education classes, or going to family therapy. Therapy can help you and others better understand the condition.  How to recognize changes in your condition  Everyone has a different response to treatment for anxiety. Recovery from anxiety happens when symptoms decrease and stop interfering with your daily activities at home or work. This may mean that you will start to:  · Have better concentration and focus.  · Sleep better.  · Be less irritable.  · Have more energy.  · Have improved memory.    It is important to recognize when your condition is getting worse. Contact your health care provider if your symptoms interfere with home or work and you do not feel like your condition is improving.  Where to  find help and support:  You can get help and support from these sources:  · Self-help groups.  · Online and community organizations.  · A trusted spiritual leader.  · Couples counseling.  · Family education classes.  · Family therapy.    Follow these instructions at home:  · Eat a healthy diet that includes plenty of vegetables, fruits, whole grains, low-fat dairy products, and lean protein. Do not eat a lot of foods that are high in solid fats, added sugars, or salt.  · Exercise. Most adults should do the following:  ? Exercise for at least 150 minutes each week. The exercise should increase your heart rate and make you sweat (moderate-intensity exercise).  ? Strengthening exercises at least twice a week.  · Cut down on caffeine, tobacco, alcohol, and other potentially harmful substances.  · Get the right amount and quality of sleep. Most adults need 7-9 hours of sleep each night.  · Make choices that simplify your life.  · Take over-the-counter and prescription medicines only as told by your health care provider.  · Avoid caffeine, alcohol, and certain over-the-counter cold medicines. These may make you feel worse. Ask your pharmacist which medicines to avoid.  · Keep all follow-up visits as told by your health care provider. This is important.  Questions to ask your health care provider  · Would I benefit from therapy?  · How often should I follow up with a health care provider?  · How long do I need to take medicine?  · Are there any long-term side effects of my medicine?  · Are there any alternatives to taking medicine?  Contact a health care provider if:  · You have a hard time staying focused or finishing daily tasks.  · You spend many hours a day feeling worried about everyday life.  · You become exhausted by worry.  · You start to have headaches, feel tense, or have nausea.  · You urinate more than normal.  · You have diarrhea.  Get help right away if:  · You have a racing heart and shortness of  breath.  · You have thoughts of hurting yourself or others.  If you ever feel like you may hurt yourself or others, or have thoughts about taking your own life, get help right away. You can go to your nearest emergency department or call:  · Your local emergency services (911 in the [place]  · A suicide crisis helpline, such as the National Suicide Prevention Lifeline at 1-227.264.7971. This is open 24-hours a day.    Summary  · Taking steps to deal with stress can help calm you.  · Medicines cannot cure anxiety disorders, but they can help ease symptoms.  · Family, friends, and partners can play a big part in helping you recover from an anxiety disorder.  This information is not intended to replace advice given to you by your health care provider. Make sure you discuss any questions you have with your health care provider.  Document Released: 12/12/2017 Document Revised: 12/12/2017 Document Reviewed: 12/12/2017  [employer] Interactive Patient Education © 2019 [employer]        Plan of care reviewed with patient at the conclusion of today's visit. Education was provided regarding diagnosis, management, and any prescribed or recommended OTC medications.Patient verbalizes understanding of and agreement with management plan.         Yi Edouard MD

## 2019-04-15 RX ORDER — VENLAFAXINE HYDROCHLORIDE 75 MG/1
CAPSULE, EXTENDED RELEASE ORAL
Qty: 30 CAPSULE | Refills: 2 | Status: SHIPPED | OUTPATIENT
Start: 2019-04-15 | End: 2019-09-30

## 2019-04-16 LAB
AMPHETAMINES UR QL SCN: NORMAL NG/ML
BARBITURATES UR QL SCN: NORMAL
BENZODIAZ UR QL SCN: NORMAL
BZE UR QL SCN: NORMAL
CANNABINOIDS UR QL SCN: NORMAL
FENTANYL+NORFENTANYL UR QL SCN: NORMAL
MEPERIDINE UR CFM-MCNC: NORMAL NG/ML
METHADONE UR QL SCN: NORMAL
OPIATES TESTED UR SCN: NORMAL
OXYCODONE/OXYMORPHONE, URINE: NORMAL
PCP UR QL: NORMAL
PROPOXYPH UR QL SCN: NORMAL
TRAMADOL UR QL SCN: NORMAL

## 2019-05-16 NOTE — TELEPHONE ENCOUNTER
Last seen-  04/02/2019    Next appointment-   10/07/2019    Last approved-   02/22/2019    [facility] PENDING

## 2019-05-17 RX ORDER — CLONAZEPAM 0.5 MG/1
0.5 TABLET ORAL NIGHTLY PRN
Qty: 30 TABLET | Refills: 2 | Status: SHIPPED | OUTPATIENT
Start: 2019-05-17 | End: 2019-08-09 | Stop reason: SDUPTHER

## 2019-05-24 RX ORDER — ESTRADIOL 0.04 MG/D
PATCH TRANSDERMAL
Qty: 4 PATCH | Refills: 4 | OUTPATIENT
Start: 2019-05-24

## 2019-05-28 RX ORDER — TEMAZEPAM 15 MG/1
CAPSULE ORAL
Qty: 60 CAPSULE | Refills: 2 | Status: SHIPPED | OUTPATIENT
Start: 2019-05-28 | End: 2019-11-14 | Stop reason: SDUPTHER

## 2019-05-28 NOTE — TELEPHONE ENCOUNTER
Last seen-   04/02/2019    Next appointment-   10/07/2019    Last approved-   03/27/2019    [facility] ENR[facility]LL[facility] PENDING

## 2019-06-28 NOTE — TELEPHONE ENCOUNTER
Not on current mediation list  At list visit it states patient was going to try not using them  Does she need a refill

## 2019-06-29 RX ORDER — ESTRADIOL 0.04 MG/D
PATCH TRANSDERMAL
Qty: 4 PATCH | Refills: 0 | Status: SHIPPED | OUTPATIENT
Start: 2019-06-29 | End: 2019-08-01 | Stop reason: SDUPTHER

## 2019-07-11 RX ORDER — VENLAFAXINE HYDROCHLORIDE 75 MG/1
CAPSULE, EXTENDED RELEASE ORAL
Qty: 30 CAPSULE | Refills: 5 | Status: SHIPPED | OUTPATIENT
Start: 2019-07-11 | End: 2020-01-10

## 2019-08-01 RX ORDER — ESTRADIOL 0.04 MG/D
PATCH TRANSDERMAL
Qty: 4 PATCH | Refills: 0 | Status: SHIPPED | OUTPATIENT
Start: 2019-08-01 | End: 2019-09-01 | Stop reason: SDUPTHER

## 2019-08-09 RX ORDER — CLONAZEPAM 0.5 MG/1
0.5 TABLET ORAL NIGHTLY PRN
Qty: 30 TABLET | Refills: 2 | Status: SHIPPED | OUTPATIENT
Start: 2019-08-09 | End: 2019-11-08 | Stop reason: SDUPTHER

## 2019-09-03 RX ORDER — ESTRADIOL 0.04 MG/D
PATCH TRANSDERMAL
Qty: 4 PATCH | Refills: 0 | Status: SHIPPED | OUTPATIENT
Start: 2019-09-03 | End: 2019-09-04 | Stop reason: SDUPTHER

## 2019-09-04 RX ORDER — ESTRADIOL 0.04 MG/D
1 PATCH TRANSDERMAL WEEKLY
Qty: 4 PATCH | Refills: 5 | Status: SHIPPED | OUTPATIENT
Start: 2019-09-04 | End: 2020-01-27

## 2019-09-20 ENCOUNTER — TELEPHONE (OUTPATIENT)
Dept: INTERNAL MEDICINE | Facility: CLINIC | Age: 69
End: 2019-09-20

## 2019-09-20 RX ORDER — FLUCONAZOLE 150 MG/1
150 TABLET ORAL DAILY
Qty: 3 TABLET | Refills: 0 | Status: SHIPPED | OUTPATIENT
Start: 2019-09-20 | End: 2020-02-03

## 2019-09-20 NOTE — TELEPHONE ENCOUNTER
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

## 2019-09-20 NOTE — TELEPHONE ENCOUNTER
Symptoms noted will send in Diflucan 150 mg daily for 3 days.  Please notify patient, med is been sent.

## 2019-09-30 ENCOUNTER — LAB (OUTPATIENT)
Dept: LAB | Facility: HOSPITAL | Age: 69
End: 2019-09-30

## 2019-09-30 ENCOUNTER — OFFICE VISIT (OUTPATIENT)
Dept: INTERNAL MEDICINE | Facility: CLINIC | Age: 69
End: 2019-09-30

## 2019-09-30 VITALS
BODY MASS INDEX: 24.8 KG/M2 | DIASTOLIC BLOOD PRESSURE: 78 MMHG | WEIGHT: 140 LBS | HEIGHT: 63 IN | SYSTOLIC BLOOD PRESSURE: 124 MMHG | HEART RATE: 68 BPM

## 2019-09-30 DIAGNOSIS — R82.90 ABNORMAL FINDING IN URINE: ICD-10-CM

## 2019-09-30 DIAGNOSIS — R61 NIGHT SWEATS: ICD-10-CM

## 2019-09-30 DIAGNOSIS — R61 NIGHT SWEATS: Primary | ICD-10-CM

## 2019-09-30 LAB
BILIRUB BLD-MCNC: NEGATIVE MG/DL
CLARITY, POC: ABNORMAL
COLOR UR: YELLOW
GLUCOSE UR STRIP-MCNC: NEGATIVE MG/DL
KETONES UR QL: NEGATIVE
LEUKOCYTE EST, POC: NEGATIVE
NITRITE UR-MCNC: NEGATIVE MG/ML
PH UR: 5 [PH] (ref 5–8)
PROT UR STRIP-MCNC: NEGATIVE MG/DL
RBC # UR STRIP: NEGATIVE /UL
SP GR UR: 1.02 (ref 1–1.03)
UROBILINOGEN UR QL: NORMAL

## 2019-09-30 PROCEDURE — 81003 URINALYSIS AUTO W/O SCOPE: CPT | Performed by: NURSE PRACTITIONER

## 2019-09-30 PROCEDURE — 87086 URINE CULTURE/COLONY COUNT: CPT

## 2019-09-30 PROCEDURE — 85025 COMPLETE CBC W/AUTO DIFF WBC: CPT

## 2019-09-30 PROCEDURE — 99213 OFFICE O/P EST LOW 20 MIN: CPT | Performed by: NURSE PRACTITIONER

## 2019-09-30 NOTE — PATIENT INSTRUCTIONS
Urine is unremarkable, will check CBC today and evaluate and monitor symptoms over the week.  If any new or worse symptoms as discussed call our office for further instructions.

## 2019-09-30 NOTE — PROGRESS NOTES
Jen MARION May  1950  [id number]  Patient Care Team:  Yi Edouard MD as PCP - General (Internal Medicine)  Yi Edouard MD as PCP - Claims Attributed  Pierre Carreno MD as Consulting Physician (Gastroenterology)    Jen Cook is a pleasant 69 y.o. female who presents for evaluation of Night Sweats      Chief Complaint   Patient presents with   • Night Sweats       HPI:   Night sweats x 3 days and sig fatigue, no hematuria, some low abd pressure, no dysuria.  No n/v, cough/sob/wheezing.  No recent change in HRT  Slept 10pm to 8:30 am  Past Medical History:   Diagnosis Date   • Anxiety    • Benign essential hypertension with target blood pressure below 140/90    • Depression    • Generalized anxiety disorder with panic attacks    • Generalized anxiety disorder with panic attacks 1/7/2019   • Major depressive disorder with single episode    • Mixed hyperlipidemia    • Primary insomnia    • Stroke (CMS/HCC) 2008    R hemispheric Stroke   • Vertigo    • Vitamin D deficiency      Past Surgical History:   Procedure Laterality Date   • AUGMENTATION MAMMAPLASTY  1990   • BREAST AUGMENTATION Bilateral 1989   • HYSTERECTOMY  1995    age 41   • OOPHORECTOMY     • PARTIAL KNEE ARTHROPLASTY  2015    Partial knee replacement 2 years ago     History reviewed. No pertinent family history.  Social History     Tobacco Use   Smoking Status Never Smoker   Smokeless Tobacco Never Used     No Known Allergies    Current Outpatient Medications:   •  clonazePAM (KlonoPIN) 0.5 MG tablet, Take 1 tablet by mouth At Night As Needed for Anxiety., Disp: 30 tablet, Rfl: 2  •  estradiol (CLIMARA) 0.0375 MG/24HR, Place 1 patch on the skin as directed by provider 1 (One) Time Per Week., Disp: 4 patch, Rfl: 5  •  fluconazole (DIFLUCAN) 150 MG tablet, Take 1 tablet by mouth Daily. (Patient taking differently: Take 150 mg by mouth Daily As Needed.), Disp: 3 tablet, Rfl: 0  •  temazepam (RESTORIL) 15 MG capsule, TAKE 1-2 CAPSULES BY ORAL  "ROUTE AT BEDTIME AS NEEDED, Disp: 60 capsule, Rfl: 2  •  venlafaxine XR (EFFEXOR-XR) 75 MG 24 hr capsule, TAKE ONE CAPSULE BY MOUTH EVERY MORNING, Disp: 30 capsule, Rfl: 5  •  vitamin D (ERGOCALCIFEROL) 91022 units capsule capsule, TAKE ONE CAPSULE BY MOUTH ONCE WEEKLY, Disp: 12 capsule, Rfl: 3    Review of Systems   Constitutional: Positive for chills. Negative for fatigue and fever.   HENT: Positive for congestion. Negative for ear pain and sinus pressure.    Respiratory: Negative for cough, chest tightness, shortness of breath and wheezing.    Cardiovascular: Negative for chest pain and palpitations.   Gastrointestinal: Negative for abdominal pain, blood in stool and constipation.   Skin: Negative for color change.   Allergic/Immunologic: Negative for environmental allergies.   Neurological: Negative for dizziness, speech difficulty and headache.   Psychiatric/Behavioral: Negative for decreased concentration. The patient is not nervous/anxious.      /78 (BP Location: Left arm, Patient Position: Sitting, Cuff Size: Adult)   Pulse 68   Ht 160 cm (62.99\")   Wt 63.5 kg (140 lb)   BMI 24.81 kg/m²     Physical Exam   Constitutional: She appears well-developed and well-nourished.   HENT:   Head: Normocephalic and atraumatic.   Right Ear: External ear normal.   Left Ear: External ear normal.   Mouth/Throat: Oropharynx is clear and moist.   Eyes: Conjunctivae and EOM are normal.   Neck: Normal range of motion. Neck supple.   Cardiovascular: Normal rate, regular rhythm and normal heart sounds.   Pulmonary/Chest: Effort normal and breath sounds normal.   Abdominal: Soft. Bowel sounds are normal. There is no hepatosplenomegaly. There is no tenderness. There is no rigidity and no guarding.   Musculoskeletal: Normal range of motion.   Neurological: She is alert.   Skin: Skin is warm and dry.   Psychiatric: She has a normal mood and affect. Her behavior is normal. Thought content normal.       Results Review:  I " Normal vision: sees adequately in most situations; can see medication labels, newsprint reviewed the patient's new clinical results.    Assessment/Plan:  Jen was seen today for night sweats.    Diagnoses and all orders for this visit:    Night sweats  -     POC Urinalysis Dipstick, Automated  -     Urine Culture - Urine, Urine, Clean Catch; Future    Abnormal finding in urine   -     Urine Culture - Urine, Urine, Clean Catch; Future       Patient Instructions   Urine is unremarkable, will check CBC today and evaluate and monitor symptoms over the week.  If any new or worse symptoms as discussed call our office for further instructions.    Plan of care reviewed with patient at the conclusion of today's visit. Education was provided regarding diagnosis, management and any prescribed or recommended OTC medications.  Patient verbalizes understanding of and agreement with management plan.    Return if symptoms worsen or fail to improve.    *Note that portions of this note were completed with a voice recognition program.  Efforts were made to edit the dictation but occasionally words are transcribed.    LLOYD Monroe

## 2019-10-01 LAB
BACTERIA SPEC AEROBE CULT: NO GROWTH
BASOPHILS # BLD AUTO: 0.08 10*3/MM3 (ref 0–0.2)
BASOPHILS NFR BLD AUTO: 0.9 % (ref 0–1.5)
DEPRECATED RDW RBC AUTO: 45 FL (ref 37–54)
EOSINOPHIL # BLD AUTO: 0.47 10*3/MM3 (ref 0–0.4)
EOSINOPHIL NFR BLD AUTO: 5.4 % (ref 0.3–6.2)
ERYTHROCYTE [DISTWIDTH] IN BLOOD BY AUTOMATED COUNT: 13.9 % (ref 12.3–15.4)
HCT VFR BLD AUTO: 39.4 % (ref 34–46.6)
HGB BLD-MCNC: 12.8 G/DL (ref 12–15.9)
IMM GRANULOCYTES # BLD AUTO: 0.03 10*3/MM3 (ref 0–0.05)
IMM GRANULOCYTES NFR BLD AUTO: 0.3 % (ref 0–0.5)
LYMPHOCYTES # BLD AUTO: 3.34 10*3/MM3 (ref 0.7–3.1)
LYMPHOCYTES NFR BLD AUTO: 38.6 % (ref 19.6–45.3)
MCH RBC QN AUTO: 28.6 PG (ref 26.6–33)
MCHC RBC AUTO-ENTMCNC: 32.5 G/DL (ref 31.5–35.7)
MCV RBC AUTO: 88.1 FL (ref 79–97)
MONOCYTES # BLD AUTO: 0.74 10*3/MM3 (ref 0.1–0.9)
MONOCYTES NFR BLD AUTO: 8.5 % (ref 5–12)
NEUTROPHILS # BLD AUTO: 4 10*3/MM3 (ref 1.7–7)
NEUTROPHILS NFR BLD AUTO: 46.3 % (ref 42.7–76)
NRBC BLD AUTO-RTO: 0.1 /100 WBC (ref 0–0.2)
PLATELET # BLD AUTO: 331 10*3/MM3 (ref 140–450)
PMV BLD AUTO: 9.6 FL (ref 6–12)
RBC # BLD AUTO: 4.47 10*6/MM3 (ref 3.77–5.28)
WBC NRBC COR # BLD: 8.66 10*3/MM3 (ref 3.4–10.8)

## 2019-10-31 ENCOUNTER — TELEPHONE (OUTPATIENT)
Dept: INTERNAL MEDICINE | Facility: CLINIC | Age: 69
End: 2019-10-31

## 2019-10-31 NOTE — TELEPHONE ENCOUNTER
"PATIENT CALLED BACK AND LVM @12:29 TODAY. SEE VOICE MESSAGE BELOW:    \"This is Yadira May I'm returning your phone call. My YOB: 1950 and my phone no. is [phone]. Thank you.\"    SEE PREVIOUS ENCOUNTER- MYRON CALLED PATIENT @11:11 TODAY.     THANK YOU  "

## 2019-11-08 ENCOUNTER — TELEPHONE (OUTPATIENT)
Dept: INTERNAL MEDICINE | Facility: CLINIC | Age: 69
End: 2019-11-08

## 2019-11-08 RX ORDER — CLONAZEPAM 0.5 MG/1
0.5 TABLET ORAL NIGHTLY PRN
Qty: 30 TABLET | Refills: 2 | Status: SHIPPED | OUTPATIENT
Start: 2019-11-08 | End: 2020-01-30 | Stop reason: SDUPTHER

## 2019-11-08 NOTE — TELEPHONE ENCOUNTER
Patient needs a refill on the following medication and sent to [facility].       clonazePAM (KlonoPIN) 0.5 MG tablet 30 tablet 2    Sig - Route: Take 1 tablet by mouth At Night As Needed for Anxiety. - Oral     [id number] - [place], [place] - [address] - [phone] PH - [phone] (Phone)  [phone] (Fax)    Last office visit 9/30/19

## 2019-11-14 ENCOUNTER — TELEPHONE (OUTPATIENT)
Dept: INTERNAL MEDICINE | Facility: CLINIC | Age: 69
End: 2019-11-14

## 2019-11-14 RX ORDER — TEMAZEPAM 15 MG/1
CAPSULE ORAL
Qty: 60 CAPSULE | Refills: 2 | Status: SHIPPED | OUTPATIENT
Start: 2019-11-14 | End: 2020-04-23 | Stop reason: SDUPTHER

## 2019-11-25 ENCOUNTER — TELEPHONE (OUTPATIENT)
Dept: INTERNAL MEDICINE | Facility: CLINIC | Age: 69
End: 2019-11-25

## 2019-11-25 NOTE — TELEPHONE ENCOUNTER
Patient LVM on Hub VM needing to reschedule today's appt due to work. Tired to call patient back to reschedule, LVM.     Pt call back   [phone]

## 2020-01-10 RX ORDER — VENLAFAXINE HYDROCHLORIDE 75 MG/1
CAPSULE, EXTENDED RELEASE ORAL
Qty: 30 CAPSULE | Refills: 5 | Status: SHIPPED | OUTPATIENT
Start: 2020-01-10 | End: 2020-07-02

## 2020-01-27 RX ORDER — ESTRADIOL 0.04 MG/D
1 PATCH TRANSDERMAL WEEKLY
Qty: 4 PATCH | Refills: 0 | Status: SHIPPED | OUTPATIENT
Start: 2020-01-27 | End: 2020-02-24

## 2020-01-30 ENCOUNTER — TELEPHONE (OUTPATIENT)
Dept: INTERNAL MEDICINE | Facility: CLINIC | Age: 70
End: 2020-01-30

## 2020-01-30 DIAGNOSIS — F41.1 GENERALIZED ANXIETY DISORDER: Primary | ICD-10-CM

## 2020-01-30 RX ORDER — CLONAZEPAM 0.5 MG/1
0.5 TABLET ORAL NIGHTLY PRN
Qty: 30 TABLET | Refills: 2 | Status: SHIPPED | OUTPATIENT
Start: 2020-01-30 | End: 2020-04-27 | Stop reason: SDUPTHER

## 2020-02-03 ENCOUNTER — OFFICE VISIT (OUTPATIENT)
Dept: INTERNAL MEDICINE | Facility: CLINIC | Age: 70
End: 2020-02-03

## 2020-02-03 VITALS
DIASTOLIC BLOOD PRESSURE: 80 MMHG | TEMPERATURE: 97.7 F | HEART RATE: 65 BPM | BODY MASS INDEX: 26.05 KG/M2 | WEIGHT: 147 LBS | HEIGHT: 63 IN | SYSTOLIC BLOOD PRESSURE: 138 MMHG

## 2020-02-03 DIAGNOSIS — I10 MILD ESSENTIAL HYPERTENSION: ICD-10-CM

## 2020-02-03 DIAGNOSIS — F32.4 MAJOR DEPRESSIVE DISORDER WITH SINGLE EPISODE, IN PARTIAL REMISSION (HCC): ICD-10-CM

## 2020-02-03 DIAGNOSIS — E78.2 MIXED HYPERLIPIDEMIA: ICD-10-CM

## 2020-02-03 DIAGNOSIS — F51.01 PRIMARY INSOMNIA: ICD-10-CM

## 2020-02-03 DIAGNOSIS — E55.9 VITAMIN D DEFICIENCY: ICD-10-CM

## 2020-02-03 DIAGNOSIS — F41.1 GENERALIZED ANXIETY DISORDER: Primary | ICD-10-CM

## 2020-02-03 PROBLEM — K62.89 RECTAL PAIN: Status: RESOLVED | Noted: 2019-04-02 | Resolved: 2020-02-03

## 2020-02-03 PROCEDURE — 99214 OFFICE O/P EST MOD 30 MIN: CPT | Performed by: INTERNAL MEDICINE

## 2020-02-03 NOTE — PROGRESS NOTES
[facility]     Jen B May  1950   [id number]      Patient Care Team:  Yi Edouard MD as PCP - General (Internal Medicine)  Yi Edouard MD as PCP - Claims Attributed  Pierre Carreno MD as Consulting Physician (Gastroenterology)    Chief Complaint   Patient presents with   • Hyperlipidemia   • Hypertension   • Med Refill     clonazepam, vitamin d            HPI  Patient is a 69 y.o. female presents with follow up anxiety and hypertension and hyperlipidemia    CHRONIC CONDITIONS  Takes venlafaxine daily and uses clonazepam as needed.  They do help her anxiety a lot.  She only takes the clonazepam in the evening's.  She is careful not to take temazepam too late because it it does cause some hangover the next morning if she does.  She usually opens the temazepam and just takes part of the granules.  She does not feel depressed.  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  She is very active at work and gets a lot of steps in every day.  She does feel she needs to rejoin the [employer] or the gym and start doing more aerobic exercise.    She does eat a low-fat and low sugar diet most of the time.    Aches and pains on and off,especially L shoulder with lifting clothes at store. Advil helps. Uses it occasionally.  Denies any stomach upset unless she eats chocolate before going to bed.    Past Medical History:   Diagnosis Date   • Anxiety    • Benign essential hypertension with target blood pressure below 140/90    • Depression    • Generalized anxiety disorder with panic attacks    • Generalized anxiety disorder with panic attacks 1/7/2019   • Major depressive disorder with single episode    • Mixed hyperlipidemia    • Primary insomnia    • Rectal pain 4/2/2019   • Stroke (CMS/[facility]) 2008    R hemispheric Stroke   • Vertigo    • Vitamin D deficiency        Past Surgical History:   Procedure  "Laterality Date   • AUGMENTATION MAMMAPLASTY  1990   • BREAST AUGMENTATION Bilateral 1989   • HYSTERECTOMY  1995    age 41   • OOPHORECTOMY     • PARTIAL KNEE ARTHROPLASTY  2015    Partial knee replacement 2 years ago       History reviewed. No pertinent family history.    Social History     Socioeconomic History   • Marital status:      Spouse name: Not on file   • Number of children: Not on file   • Years of education: Not on file   • Highest education level: Not on file   Tobacco Use   • Smoking status: Never Smoker   • Smokeless tobacco: Never Used   Substance and Sexual Activity   • Alcohol use: No     Frequency: Never   • Drug use: No   • Sexual activity: Defer       No Known Allergies    Review of Systems:     Review of Systems   Constitutional: Negative for chills, fatigue and fever.   HENT: Negative for congestion, ear pain and sinus pressure.    Respiratory: Negative for cough, chest tightness, shortness of breath and wheezing.    Cardiovascular: Negative for chest pain, palpitations and leg swelling.   Gastrointestinal: Negative for abdominal pain, blood in stool and constipation.   Skin: Negative for color change.   Allergic/Immunologic: Negative for environmental allergies.   Neurological: Negative for dizziness, speech difficulty and headache.   Psychiatric/Behavioral: Positive for sleep disturbance. Negative for decreased concentration, suicidal ideas and depressed mood. The patient is nervous/anxious.        Vital Signs  Vitals:    02/03/20 1054   BP: 138/80   BP Location: Left arm   Patient Position: Sitting   Cuff Size: Adult   Pulse: 65   Temp: 97.7 °F (36.5 °C)   TempSrc: Temporal   Weight: 66.7 kg (147 lb)   Height: 160 cm (62.99\")   PainSc: 0-No pain     Body mass index is 26.05 kg/m².      Current Outpatient Medications:   •  clonazePAM (KlonoPIN) 0.5 MG tablet, Take 1 tablet by mouth At Night As Needed for Anxiety., Disp: 30 tablet, Rfl: 2  •  estradiol (CLIMARA) 0.0375 MG/24HR, PLACE " 1 PATCH ON THE SKIN AS DIRECTED BY PROVIDER 1 (ONE) TIME PER WEEK., Disp: 4 patch, Rfl: 0  •  temazepam (RESTORIL) 15 MG capsule, TAKE 1-2 CAPSULES BY ORAL ROUTE AT BEDTIME AS NEEDED, Disp: 60 capsule, Rfl: 2  •  venlafaxine XR (EFFEXOR-XR) 75 MG 24 hr capsule, TAKE ONE CAPSULE BY MOUTH EVERY MORNING, Disp: 30 capsule, Rfl: 5  •  vitamin D (ERGOCALCIFEROL) 86468 units capsule capsule, TAKE ONE CAPSULE BY MOUTH ONCE WEEKLY, Disp: 12 capsule, Rfl: 3    Physical Exam:    Physical Exam   Constitutional: She is oriented to person, place, and time. She appears well-developed and well-nourished.   HENT:   Head: Normocephalic.   Eyes: Pupils are equal, round, and reactive to light. Conjunctivae and EOM are normal.   Neck: Normal range of motion. Neck supple. No thyromegaly present.   Cardiovascular: Normal rate, regular rhythm, normal heart sounds and intact distal pulses.   Pulmonary/Chest: Effort normal and breath sounds normal.   Musculoskeletal: Normal range of motion. She exhibits no edema.   Lymphadenopathy:     She has no cervical adenopathy.   Neurological: She is alert and oriented to person, place, and time.   Psychiatric: She has a normal mood and affect. Her speech is normal and behavior is normal. Thought content normal.   Nursing note and vitals reviewed.       ACE III MINI        Results Review:    None    CMP:  Lab Results   Component Value Date    BUN 15 03/27/2019    CREATININE 0.72 03/27/2019    EGFRIFNONA 80 03/27/2019    BCR 20.8 03/27/2019     03/27/2019    K 4.3 03/27/2019    CO2 26.0 03/27/2019    CALCIUM 9.2 03/27/2019    ALBUMIN 4.30 03/27/2019    BILITOT 0.6 03/27/2019    ALKPHOS 61 03/27/2019    AST 19 03/27/2019    ALT 18 03/27/2019     HbA1c:  No results found for: HGBA1C  Microalbumin:  Lab Results   Component Value Date    MICROALBUR <3.0 03/27/2019     Lipid Panel  Lab Results   Component Value Date    CHOL 235 (H) 03/27/2019    TRIG 127 03/27/2019    HDL 61 (H) 03/27/2019      (H) 03/27/2019    AST 19 03/27/2019    ALT 18 03/27/2019       Medication Review: Medications reviewed and noted  Patient Instructions   Problem List Items Addressed This Visit        Cardiovascular and Mediastinum    Mixed hyperlipidemia    Overview     2/3/2020 Yi Edouard MD    Continue to improve low-fat and low sugar diet.  Increase exercise and physical activity.  Goal is to get 30 minutes a day 5 days a week.         Mild essential hypertension    Overview     2/3/2020 Yi Edouard MD    Continue lifestyle control of the blood pressure by avoiding salt in the diet and avoiding sodas and getting regular exercise.            Digestive    Vitamin D deficiency    Overview     2/3/2020 Yi Edouard MD    Continue current dose of vitamin D.            Other    Primary insomnia    Overview     2/3/2020 Yi Edouard MD    Continue temazepam as needed.    We discussed sleep hygiene including going to bed at the same time and getting up at the same time every day, going to bed early enough to get 7 or 8 hours in bed, reading and relaxing before bedtime, and avoiding the TV, computer, phone, iPad close to bedtime.           Generalized anxiety disorder - Primary    Overview     2/3/2020 Yi Edouard MD    Continue venlafaxine daily and clonazepam in the evenings as needed.  Continue regular exercise and physical activity.  Continue social activities with friends and family.         Relevant Medications    temazepam (RESTORIL) 15 MG capsule    venlafaxine XR (EFFEXOR-XR) 75 MG 24 hr capsule    clonazePAM (KlonoPIN) 0.5 MG tablet    Major depressive disorder with single episode, in partial remission (CMS/[facility])    Overview     2/3/2020 Yi Edouard MD    Continue venlafaxine daily and clonazepam in the evenings as needed.  Continue regular exercise and physical activity.  Continue social activities with friends and family.           Relevant Medications    temazepam (RESTORIL) 15 MG capsule     venlafaxine XR (EFFEXOR-XR) 75 MG 24 hr capsule             Diagnosis Plan   1. Generalized anxiety disorder     2. Mixed hyperlipidemia     3. Primary insomnia     4. Major depressive disorder with single episode, in partial remission (CMS/[facility])     5. Mild essential hypertension     6. Vitamin D deficiency         Plan of care reviewed with patient at the conclusion of today's visit. Education was provided regarding diagnosis, management, and any prescribed or recommended OTC medications.Patient verbalizes understanding of and agreement with management plan.         Yi Edouard MD

## 2020-02-03 NOTE — PATIENT INSTRUCTIONS
Patient Instructions   Problem List Items Addressed This Visit        Cardiovascular and Mediastinum    Mixed hyperlipidemia    Overview     2/3/2020 Yi Edouard MD    Continue to improve low-fat and low sugar diet.  Increase exercise and physical activity.  Goal is to get 30 minutes a day 5 days a week.         Mild essential hypertension    Overview     2/3/2020 Yi Edouard MD    Continue lifestyle control of the blood pressure by avoiding salt in the diet and avoiding sodas and getting regular exercise.            Digestive    Vitamin D deficiency    Overview     2/3/2020 Yi Edouard MD    Continue current dose of vitamin D.            Other    Primary insomnia    Overview     2/3/2020 Yi Edouard MD    Continue temazepam as needed.    We discussed sleep hygiene including going to bed at the same time and getting up at the same time every day, going to bed early enough to get 7 or 8 hours in bed, reading and relaxing before bedtime, and avoiding the TV, computer, phone, iPad close to bedtime.           Generalized anxiety disorder - Primary    Overview     2/3/2020 Yi Edouard MD    Continue venlafaxine daily and clonazepam in the evenings as needed.  Continue regular exercise and physical activity.  Continue social activities with friends and family.         Relevant Medications    temazepam (RESTORIL) 15 MG capsule    venlafaxine XR (EFFEXOR-XR) 75 MG 24 hr capsule    clonazePAM (KlonoPIN) 0.5 MG tablet    Major depressive disorder with single episode, in partial remission (CMS/HCC)    Overview     2/3/2020 Yi Edouard MD    Continue venlafaxine daily and clonazepam in the evenings as needed.  Continue regular exercise and physical activity.  Continue social activities with friends and family.           Relevant Medications    temazepam (RESTORIL) 15 MG capsule    venlafaxine XR (EFFEXOR-XR) 75 MG 24 hr capsule

## 2020-02-14 ENCOUNTER — OFFICE VISIT (OUTPATIENT)
Dept: INTERNAL MEDICINE | Facility: CLINIC | Age: 70
End: 2020-02-14

## 2020-02-14 VITALS
SYSTOLIC BLOOD PRESSURE: 130 MMHG | DIASTOLIC BLOOD PRESSURE: 78 MMHG | HEART RATE: 80 BPM | HEIGHT: 63 IN | WEIGHT: 146 LBS | BODY MASS INDEX: 25.87 KG/M2 | TEMPERATURE: 100.1 F

## 2020-02-14 DIAGNOSIS — E78.2 MIXED HYPERLIPIDEMIA: ICD-10-CM

## 2020-02-14 DIAGNOSIS — R50.9 FEBRILE ILLNESS, ACUTE: Primary | ICD-10-CM

## 2020-02-14 DIAGNOSIS — F32.4 MAJOR DEPRESSIVE DISORDER WITH SINGLE EPISODE, IN PARTIAL REMISSION (HCC): ICD-10-CM

## 2020-02-14 DIAGNOSIS — F51.01 PRIMARY INSOMNIA: ICD-10-CM

## 2020-02-14 DIAGNOSIS — R50.9 FEVER AND CHILLS: ICD-10-CM

## 2020-02-14 DIAGNOSIS — R19.7 DIARRHEA, UNSPECIFIED TYPE: ICD-10-CM

## 2020-02-14 DIAGNOSIS — F41.1 GENERALIZED ANXIETY DISORDER: ICD-10-CM

## 2020-02-14 DIAGNOSIS — I10 MILD ESSENTIAL HYPERTENSION: ICD-10-CM

## 2020-02-14 LAB
EXPIRATION DATE: NORMAL
FLUAV RNA RESP QL NAA+PROBE: NEGATIVE
FLUBV RNA RESP QL NAA+PROBE: NEGATIVE
INTERNAL CONTROL: NORMAL
Lab: NORMAL

## 2020-02-14 PROCEDURE — 99214 OFFICE O/P EST MOD 30 MIN: CPT | Performed by: INTERNAL MEDICINE

## 2020-02-14 PROCEDURE — 87502 INFLUENZA DNA AMP PROBE: CPT | Performed by: INTERNAL MEDICINE

## 2020-02-14 NOTE — PATIENT INSTRUCTIONS
Patient Instructions   Problem List Items Addressed This Visit        Cardiovascular and Mediastinum    Mixed hyperlipidemia    Overview     2/14/2020 Yi Edouard MD    Continue to improve low-fat and low sugar diet.  Increase exercise and physical activity.  Goal is to get 30 minutes a day 5 days a week.         Mild essential hypertension    Overview     2/14/2020 Yi Edouard MD    Continue lifestyle control of the blood pressure by avoiding salt in the diet and avoiding sodas and getting regular exercise.            Other    Primary insomnia    Overview     2/14/2020 Yi Edouard MD    Continue temazepam as needed.    We discussed sleep hygiene including going to bed at the same time and getting up at the same time every day, going to bed early enough to get 7 or 8 hours in bed, reading and relaxing before bedtime, and avoiding the TV, computer, phone, iPad close to bedtime.           Generalized anxiety disorder    Overview     2/14/2020 Yi Edouard MD    Continue venlafaxine daily and clonazepam in the evenings as needed.  Continue regular exercise and physical activity.  Continue social activities with friends and family.         Relevant Medications    temazepam (RESTORIL) 15 MG capsule    venlafaxine XR (EFFEXOR-XR) 75 MG 24 hr capsule    clonazePAM (KlonoPIN) 0.5 MG tablet    Major depressive disorder with single episode, in partial remission (CMS/HCC)    Overview     2/14/2020 Yi Edouard MD    Continue venlafaxine daily and clonazepam in the evenings as needed.  Continue regular exercise and physical activity.  Continue social activities with friends and family.           Relevant Medications    temazepam (RESTORIL) 15 MG capsule    venlafaxine XR (EFFEXOR-XR) 75 MG 24 hr capsule    Febrile illness, acute - Primary    Overview     2/14/2020 Yi Edouard MD     Symptoms are most likely viral. Just treat the symptoms. Drink plenty of fluids and rest at home.  May return to  work on Monday.            Other Visit Diagnoses     Diarrhea, unspecified type        May use over-the-counter medications like Pepto-Bismol and Imodium as needed.  Eat a bland low-fat diet.  Drink plenty of fluids.    Fever and chills        Relevant Orders    POCT Flu A&B, Molecular (Completed)

## 2020-02-21 ENCOUNTER — TELEPHONE (OUTPATIENT)
Dept: INTERNAL MEDICINE | Facility: CLINIC | Age: 70
End: 2020-02-21

## 2020-02-21 NOTE — TELEPHONE ENCOUNTER
Patient no showed Mamm & DEXA ordered 3/20/19  She has an appointment with you 3/30/20  Can ordered be canceled?

## 2020-02-24 RX ORDER — ESTRADIOL 0.04 MG/D
1 PATCH TRANSDERMAL WEEKLY
Qty: 4 PATCH | Refills: 0 | Status: SHIPPED | OUTPATIENT
Start: 2020-02-24 | End: 2020-04-20

## 2020-04-20 RX ORDER — ESTRADIOL 0.04 MG/D
1 PATCH TRANSDERMAL WEEKLY
Qty: 4 PATCH | Refills: 0 | Status: SHIPPED | OUTPATIENT
Start: 2020-04-20 | End: 2020-05-14

## 2020-04-23 DIAGNOSIS — F51.01 PRIMARY INSOMNIA: Primary | ICD-10-CM

## 2020-04-23 RX ORDER — TEMAZEPAM 15 MG/1
CAPSULE ORAL
Qty: 60 CAPSULE | Refills: 2 | Status: SHIPPED | OUTPATIENT
Start: 2020-04-23 | End: 2020-10-15 | Stop reason: SDUPTHER

## 2020-04-23 NOTE — TELEPHONE ENCOUNTER
Last Seen:   02/14/2020    Follow Up:  05/30/2020    Last approved:   11/14/2019           Refills:  0    Darshan: Completed-  On counter for your review

## 2020-04-27 DIAGNOSIS — F41.1 GENERALIZED ANXIETY DISORDER: ICD-10-CM

## 2020-04-27 RX ORDER — CLONAZEPAM 0.5 MG/1
0.5 TABLET ORAL NIGHTLY PRN
Qty: 30 TABLET | Refills: 2 | Status: SHIPPED | OUTPATIENT
Start: 2020-04-27 | End: 2020-07-24 | Stop reason: SDUPTHER

## 2020-04-27 NOTE — TELEPHONE ENCOUNTER
Last Seen:   02/14/2020    Follow Up:  06/23/2020    Last approved:   01/30/2020         Refills:   2    Darshan: current ( 1/30/20 )

## 2020-05-14 RX ORDER — ESTRADIOL 0.04 MG/D
1 PATCH TRANSDERMAL WEEKLY
Qty: 5 PATCH | Refills: 5 | Status: SHIPPED | OUTPATIENT
Start: 2020-05-14 | End: 2020-12-09 | Stop reason: SDUPTHER

## 2020-05-19 RX ORDER — ERGOCALCIFEROL 1.25 MG/1
CAPSULE ORAL
Qty: 12 CAPSULE | Refills: 3 | Status: SHIPPED | OUTPATIENT
Start: 2020-05-19 | End: 2021-06-02

## 2020-06-23 ENCOUNTER — OFFICE VISIT (OUTPATIENT)
Dept: INTERNAL MEDICINE | Facility: CLINIC | Age: 70
End: 2020-06-23

## 2020-06-23 ENCOUNTER — LAB (OUTPATIENT)
Dept: LAB | Facility: HOSPITAL | Age: 70
End: 2020-06-23

## 2020-06-23 ENCOUNTER — TELEPHONE (OUTPATIENT)
Dept: INTERNAL MEDICINE | Facility: CLINIC | Age: 70
End: 2020-06-23

## 2020-06-23 VITALS
HEIGHT: 63 IN | DIASTOLIC BLOOD PRESSURE: 84 MMHG | HEART RATE: 72 BPM | BODY MASS INDEX: 25.34 KG/M2 | TEMPERATURE: 97.1 F | SYSTOLIC BLOOD PRESSURE: 142 MMHG | WEIGHT: 143 LBS

## 2020-06-23 DIAGNOSIS — E55.9 VITAMIN D DEFICIENCY: ICD-10-CM

## 2020-06-23 DIAGNOSIS — Z12.31 ENCOUNTER FOR SCREENING MAMMOGRAM FOR MALIGNANT NEOPLASM OF BREAST: ICD-10-CM

## 2020-06-23 DIAGNOSIS — I10 MILD ESSENTIAL HYPERTENSION: ICD-10-CM

## 2020-06-23 DIAGNOSIS — Z11.59 ENCOUNTER FOR HEPATITIS C SCREENING TEST FOR LOW RISK PATIENT: ICD-10-CM

## 2020-06-23 DIAGNOSIS — Z79.899 LONG-TERM USE OF HIGH-RISK MEDICATION: ICD-10-CM

## 2020-06-23 DIAGNOSIS — E78.2 MIXED HYPERLIPIDEMIA: ICD-10-CM

## 2020-06-23 DIAGNOSIS — Z12.39 BREAST CANCER SCREENING: Primary | ICD-10-CM

## 2020-06-23 DIAGNOSIS — E78.2 MIXED HYPERLIPIDEMIA: Primary | ICD-10-CM

## 2020-06-23 DIAGNOSIS — Z78.0 POSTMENOPAUSAL: ICD-10-CM

## 2020-06-23 LAB
AMPHET+METHAMPHET UR QL: NEGATIVE
AMPHETAMINES UR QL: NEGATIVE
BARBITURATES UR QL SCN: NEGATIVE
BASOPHILS # BLD AUTO: 0.07 10*3/MM3 (ref 0–0.2)
BASOPHILS NFR BLD AUTO: 0.9 % (ref 0–1.5)
BENZODIAZ UR QL SCN: POSITIVE
BUPRENORPHINE SERPL-MCNC: NEGATIVE NG/ML
CANNABINOIDS SERPL QL: NEGATIVE
COCAINE UR QL: NEGATIVE
DEPRECATED RDW RBC AUTO: 44.1 FL (ref 37–54)
EOSINOPHIL # BLD AUTO: 0.34 10*3/MM3 (ref 0–0.4)
EOSINOPHIL NFR BLD AUTO: 4.6 % (ref 0.3–6.2)
ERYTHROCYTE [DISTWIDTH] IN BLOOD BY AUTOMATED COUNT: 13.6 % (ref 12.3–15.4)
HCT VFR BLD AUTO: 43.3 % (ref 34–46.6)
HGB BLD-MCNC: 14.3 G/DL (ref 12–15.9)
IMM GRANULOCYTES # BLD AUTO: 0.04 10*3/MM3 (ref 0–0.05)
IMM GRANULOCYTES NFR BLD AUTO: 0.5 % (ref 0–0.5)
LYMPHOCYTES # BLD AUTO: 2.55 10*3/MM3 (ref 0.7–3.1)
LYMPHOCYTES NFR BLD AUTO: 34.2 % (ref 19.6–45.3)
MCH RBC QN AUTO: 29.2 PG (ref 26.6–33)
MCHC RBC AUTO-ENTMCNC: 33 G/DL (ref 31.5–35.7)
MCV RBC AUTO: 88.4 FL (ref 79–97)
METHADONE UR QL SCN: NEGATIVE
MONOCYTES # BLD AUTO: 0.7 10*3/MM3 (ref 0.1–0.9)
MONOCYTES NFR BLD AUTO: 9.4 % (ref 5–12)
NEUTROPHILS # BLD AUTO: 3.75 10*3/MM3 (ref 1.7–7)
NEUTROPHILS NFR BLD AUTO: 50.4 % (ref 42.7–76)
NRBC BLD AUTO-RTO: 0 /100 WBC (ref 0–0.2)
OPIATES UR QL: NEGATIVE
OXYCODONE UR QL SCN: NEGATIVE
PCP UR QL SCN: NEGATIVE
PLATELET # BLD AUTO: 380 10*3/MM3 (ref 140–450)
PMV BLD AUTO: 9.7 FL (ref 6–12)
PROPOXYPH UR QL: NEGATIVE
RBC # BLD AUTO: 4.9 10*6/MM3 (ref 3.77–5.28)
TRICYCLICS UR QL SCN: NEGATIVE
WBC NRBC COR # BLD: 7.45 10*3/MM3 (ref 3.4–10.8)

## 2020-06-23 PROCEDURE — 84443 ASSAY THYROID STIM HORMONE: CPT

## 2020-06-23 PROCEDURE — 80053 COMPREHEN METABOLIC PANEL: CPT

## 2020-06-23 PROCEDURE — 82043 UR ALBUMIN QUANTITATIVE: CPT

## 2020-06-23 PROCEDURE — 85025 COMPLETE CBC W/AUTO DIFF WBC: CPT

## 2020-06-23 PROCEDURE — G0439 PPPS, SUBSEQ VISIT: HCPCS | Performed by: NURSE PRACTITIONER

## 2020-06-23 PROCEDURE — 80306 DRUG TEST PRSMV INSTRMNT: CPT

## 2020-06-23 PROCEDURE — 82306 VITAMIN D 25 HYDROXY: CPT

## 2020-06-23 PROCEDURE — 86803 HEPATITIS C AB TEST: CPT

## 2020-06-23 PROCEDURE — 80061 LIPID PANEL: CPT

## 2020-06-23 PROCEDURE — 82570 ASSAY OF URINE CREATININE: CPT

## 2020-06-23 NOTE — PROGRESS NOTES
The ABCs of the Annual Wellness Visit  Subsequent Medicare Wellness Visit    Chief Complaint   Patient presents with   • Annual Exam     Patient is fasting       Subjective   History of Present Illness:  Jen Cook is a 69 y.o. female who presents for a Subsequent Medicare Wellness Visit.    HEALTH RISK ASSESSMENT    Recent Hospitalizations:  No hospitalization(s) within the last year.    Current Medical Providers:  Patient Care Team:  Yi Edouard MD as PCP - General (Internal Medicine)  Tammy Jacobs APRN as PCP - Claims Attributed  Pierre Carreno MD as Consulting Physician (Gastroenterology)    Smoking Status:  Social History     Tobacco Use   Smoking Status Never Smoker   Smokeless Tobacco Never Used       Alcohol Consumption:  Social History     Substance and Sexual Activity   Alcohol Use Yes   • Frequency: Never    Comment: socially        Depression Screen:   PHQ-2/PHQ-9 Depression Screening 6/23/2020   Little interest or pleasure in doing things 0   Feeling down, depressed, or hopeless 0   Total Score 0       Fall Risk Screen:  PAUL Fall Risk Assessment was completed, and patient is at LOW risk for falls.Assessment completed on:6/23/2020    Health Habits and Functional and Cognitive Screening:  Functional & Cognitive Status 6/23/2020   Do you have difficulty preparing food and eating? No   Do you have difficulty bathing yourself, getting dressed or grooming yourself? No   Do you have difficulty using the toilet? No   Do you have difficulty moving around from place to place? No   Do you have trouble with steps or getting out of a bed or a chair? No   Current Diet Well Balanced Diet   Dental Exam Up to date   Eye Exam Up to date   Exercise (times per week) 0 times per week   Current Exercise Activities Include None   Do you need help using the phone?  No   Are you deaf or do you have serious difficulty hearing?  No   Do you need help with transportation? No   Do you need help shopping? No   Do  you need help preparing meals?  No   Do you need help with housework?  No   Do you need help with laundry? No   Do you need help taking your medications? No   Do you need help managing money? No   Do you ever drive or ride in a car without wearing a seat belt? No   Have you felt unusual stress, anger or loneliness in the last month? No   Who do you live with? Child   If you need help, do you have trouble finding someone available to you? No   Have you been bothered in the last four weeks by sexual problems? No   Do you have difficulty concentrating, remembering or making decisions? No         Does the patient have evidence of cognitive impairment? No    Asprin use counseling:Does not need ASA (and currently is not on it)    Age-appropriate Screening Schedule:  Refer to the list below for future screening recommendations based on patient's age, sex and/or medical conditions. Orders for these recommended tests are listed in the plan section. The patient has been provided with a written plan.    Health Maintenance   Topic Date Due   • ZOSTER VACCINE (1 of 2) 07/13/1999   • COLONOSCOPY  04/11/2018   • DXA SCAN  01/29/2019   • MAMMOGRAM  08/10/2019   • LIPID PANEL  03/27/2020   • INFLUENZA VACCINE  08/01/2020   • TDAP/TD VACCINES (2 - Td) 02/13/2027          The following portions of the patient's history were reviewed and updated as appropriate: allergies, current medications, past family history, past medical history, past social history, past surgical history and problem list.    Outpatient Medications Prior to Visit   Medication Sig Dispense Refill   • clonazePAM (KlonoPIN) 0.5 MG tablet Take 1 tablet by mouth At Night As Needed for Anxiety. 30 tablet 2   • estradiol (CLIMARA) 0.0375 MG/24HR PLACE 1 PATCH ON THE SKIN AS DIRECTED BY PROVIDER 1 (ONE) TIME PER WEEK. 5 patch 5   • temazepam (RESTORIL) 15 MG capsule TAKE 1-2 CAPSULES BY ORAL ROUTE AT BEDTIME AS NEEDED 60 capsule 2   • venlafaxine XR (EFFEXOR-XR) 75 MG 24  hr capsule TAKE ONE CAPSULE BY MOUTH EVERY MORNING 30 capsule 5   • vitamin D (ERGOCALCIFEROL) 1.25 MG (40985 UT) capsule capsule TAKE ONE CAPSULE BY MOUTH ONE TIME PER WEEK 12 capsule 3     No facility-administered medications prior to visit.        Patient Active Problem List   Diagnosis   • Primary insomnia   • Generalized anxiety disorder   • Osteoarthritis   • Other fatigue   • Vitamin D deficiency   • Hip pain, bilateral   • Mixed hyperlipidemia   • ADD (attention deficit disorder) without hyperactivity   • Stress and adjustment reaction   • Major depressive disorder with single episode, in partial remission (CMS/HCC)   • Allergic rhinitis   • BMI 24.0-24.9, adult   • History of cerebrovascular disease   • Menopausal and postmenopausal disorder   • Nutritional anemia   • Benign paroxysmal positional vertigo due to bilateral vestibular disorder   • Mild essential hypertension   • Febrile illness, acute       Advanced Care Planning:  ACP discussion was held with the patient during this visit. Patient does not have an advance directive, information provided.    Review of Systems   Constitutional: Negative for chills, fatigue and fever.   HENT: Negative for congestion, ear pain and sinus pressure.    Respiratory: Negative for cough, chest tightness, shortness of breath and wheezing.    Cardiovascular: Negative for chest pain and palpitations.   Gastrointestinal: Negative for abdominal pain, blood in stool and constipation.   Skin: Negative for color change.   Allergic/Immunologic: Negative for environmental allergies.   Neurological: Negative for dizziness, speech difficulty and headaches.   Psychiatric/Behavioral: Negative for confusion. The patient is nervous/anxious.        Compared to one year ago, the patient feels her physical health is the same.  Compared to one year ago, the patient feels her mental health is the same.    Reviewed chart for potential of high risk medication in the elderly: yes  Reviewed  "chart for potential of harmful drug interactions in the elderly:yes    Objective         Vitals:    06/23/20 1051   BP: 142/84   BP Location: Left arm   Patient Position: Sitting   Cuff Size: Adult   Pulse: 72   Temp: 97.1 °F (36.2 °C)   TempSrc: Temporal   Weight: 64.9 kg (143 lb)   Height: 160 cm (62.99\")   PainSc:   5   PainLoc: Shoulder       Body mass index is 25.34 kg/m².  Discussed the patient's BMI with her. The BMI is in the acceptable range.    Physical Exam          Assessment/Plan   Medicare Risks and Personalized Health Plan  CMS Preventative Services Quick Reference  Immunizations Discussed/Encouraged (specific immunizations; Hepatitis A Vaccine/Series and Shingrix )    The above risks/problems have been discussed with the patient.  Pertinent information has been shared with the patient in the After Visit Summary.  Follow up plans and orders are seen below in the Assessment/Plan Section.    Diagnoses and all orders for this visit:    1. Breast cancer screening (Primary)  -     Mammo Screening Digital Tomosynthesis Bilateral With CAD; Future    2. Postmenopausal  -     DEXA Bone Density Axial; Future    3. Encounter for screening mammogram for malignant neoplasm of breast   -     Mammo Screening Digital Tomosynthesis Bilateral With CAD; Future      Follow Up:  Return for Labs this visit, Next scheduled follow up.     An After Visit Summary and PPPS were given to the patient.             "

## 2020-06-23 NOTE — PATIENT INSTRUCTIONS
Need Hep A #2 and Shingrix vaccines when available, check with your local pharmacy for available.  Due for colonoscopy, mammogram, bone density study.  If you would like us to refer you to someone to review LivingWell and advanced directive measures and person we will be glad to, just let us know.    Medicare Wellness  Personal Prevention Plan of Service     Date of Office Visit:  2020  Encounter Provider:  LLOYD Monroe  Place of Service:  [facility]  Patient Name: Jen MARION May  :  1950    As part of the Medicare Wellness portion of your visit today, we are providing you with this personalized preventive plan of services (PPPS). This plan is based upon recommendations of the [place] Preventive Services Task Force (USPSTF) and the Advisory Committee on Immunization Practices (ACIP).    This lists the preventive care services that should be considered, and provides dates of when you are due. Items listed as completed are up-to-date and do not require any further intervention.    Health Maintenance   Topic Date Due   • ZOSTER VACCINE (1 of 2) 1999   • Pneumococcal Vaccine Once at 65 Years Old  2014   • HEPATITIS C SCREENING  2018   • COLONOSCOPY  2018   • DXA SCAN  2019   • MAMMOGRAM  08/10/2019   • MEDICARE ANNUAL WELLNESS  2020   • LIPID PANEL  2020   • INFLUENZA VACCINE  2020   • TDAP/TD VACCINES (2 - Td) 2027       Orders Placed This Encounter   Procedures   • DEXA Bone Density Axial     Standing Status:   Future     Standing Expiration Date:   2021     Order Specific Question:   Reason for Exam:     Answer:   postmenopausal   • Mammo Screening Digital Tomosynthesis Bilateral With CAD     Standing Status:   Future     Standing Expiration Date:   2021     Order Specific Question:   Reason for Exam:     Answer:   breast ca screening       Return for Labs this visit, Next scheduled follow  up.        Advance Directive    Advance directives are legal documents that let you make choices ahead of time about your health care and medical treatment in case you become unable to communicate for yourself. Advance directives are a way for you to communicate your wishes to family, friends, and health care providers. This can help convey your decisions about end-of-life care if you become unable to communicate.  Discussing and writing advance directives should happen over time rather than all at once. Advance directives can be changed depending on your situation and what you want, even after you have signed the advance directives.  If you do not have an advance directive, some states assign family decision makers to act on your behalf based on how closely you are related to them. Each state has its own laws regarding advance directives. You may want to check with your health care provider, , or state representative about the laws in your state. There are different types of advance directives, such as:  · Medical power of .  · Living will.  · Do not resuscitate (DNR) or do not attempt resuscitation (DNAR) order.  Health care proxy and medical power of   A health care proxy, also called a health care agent, is a person who is appointed to make medical decisions for you in cases in which you are unable to make the decisions yourself. Generally, people choose someone they know well and trust to represent their preferences. Make sure to ask this person for an agreement to act as your proxy. A proxy may have to exercise judgment in the event of a medical decision for which your wishes are not known.  A medical power of  is a legal document that names your health care proxy. Depending on the laws in your state, after the document is written, it may also need to be:  · Signed.  · Notarized.  · Dated.  · Copied.  · Witnessed.  · Incorporated into your medical record.  You may also want to  appoint someone to manage your financial affairs in a situation in which you are unable to do so. This is called a durable power of  for finances. It is a separate legal document from the durable power of  for health care. You may choose the same person or someone different from your health care proxy to act as your agent in financial matters.  If you do not appoint a proxy, or if there is a concern that the proxy is not acting in your best interests, a court-appointed guardian may be designated to act on your behalf.  Living will  A living will is a set of instructions documenting your wishes about medical care when you cannot express them yourself. Health care providers should keep a copy of your living will in your medical record. You may want to give a copy to family members or friends. To alert caregivers in case of an emergency, you can place a card in your wallet to let them know that you have a living will and where they can find it. A living will is used if you become:  · Terminally ill.  · Incapacitated.  · Unable to communicate or make decisions.  Items to consider in your living will include:  · The use or non-use of life-sustaining equipment, such as dialysis machines and breathing machines (ventilators).  · A DNR or DNAR order, which is the instruction not to use cardiopulmonary resuscitation (CPR) if breathing or heartbeat stops.  · The use or non-use of tube feeding.  · Withholding of food and fluids.  · Comfort (palliative) care when the goal becomes comfort rather than a cure.  · Organ and tissue donation.  A living will does not give instructions for distributing your money and property if you should pass away. It is recommended that you seek the advice of a  when writing a will. Decisions about taxes, beneficiaries, and asset distribution will be legally binding. This process can relieve your family and friends of any concerns surrounding disputes or questions that may come  up about the distribution of your assets.  DNR or DNAR  A DNR or DNAR order is a request not to have CPR in the event that your heart stops beating or you stop breathing. If a DNR or DNAR order has not been made and shared, a health care provider will try to help any patient whose heart has stopped or who has stopped breathing. If you plan to have surgery, talk with your health care provider about how your DNR or DNAR order will be followed if problems occur.  Summary  · Advance directives are the legal documents that allow you to make choices ahead of time about your health care and medical treatment in case you become unable to communicate for yourself.  · The process of discussing and writing advance directives should happen over time. You can change the advance directives, even after you have signed them.  · Advance directives include DNR or DNAR orders, living lima, and designating an agent as your medical power of .  This information is not intended to replace advice given to you by your health care provider. Make sure you discuss any questions you have with your health care provider.  Document Released: 03/26/2009 Document Revised: 01/22/2020 Document Reviewed: 11/06/2017  Elsevier Patient Education © 2020 [employer]

## 2020-06-24 LAB
25(OH)D3 SERPL-MCNC: 37.2 NG/ML (ref 30–100)
ALBUMIN SERPL-MCNC: 4.5 G/DL (ref 3.5–5.2)
ALBUMIN UR-MCNC: <1.2 MG/DL
ALBUMIN/GLOB SERPL: 1.5 G/DL
ALP SERPL-CCNC: 61 U/L (ref 39–117)
ALT SERPL W P-5'-P-CCNC: 11 U/L (ref 1–33)
ANION GAP SERPL CALCULATED.3IONS-SCNC: 12.1 MMOL/L (ref 5–15)
AST SERPL-CCNC: 17 U/L (ref 1–32)
BILIRUB SERPL-MCNC: 0.3 MG/DL (ref 0.2–1.2)
BUN BLD-MCNC: 15 MG/DL (ref 8–23)
BUN/CREAT SERPL: 21.4 (ref 7–25)
CALCIUM SPEC-SCNC: 9.4 MG/DL (ref 8.6–10.5)
CHLORIDE SERPL-SCNC: 98 MMOL/L (ref 98–107)
CHOLEST SERPL-MCNC: 247 MG/DL (ref 0–200)
CO2 SERPL-SCNC: 25.9 MMOL/L (ref 22–29)
CREAT BLD-MCNC: 0.7 MG/DL (ref 0.57–1)
CREAT UR-MCNC: 36.1 MG/DL
GFR SERPL CREATININE-BSD FRML MDRD: 83 ML/MIN/1.73
GLOBULIN UR ELPH-MCNC: 3 GM/DL
GLUCOSE BLD-MCNC: 80 MG/DL (ref 65–99)
HCV AB SER DONR QL: NORMAL
HDLC SERPL-MCNC: 56 MG/DL (ref 40–60)
LDLC SERPL CALC-MCNC: 152 MG/DL (ref 0–100)
LDLC/HDLC SERPL: 2.72 {RATIO}
MICROALBUMIN/CREAT UR: NORMAL MG/G{CREAT}
POTASSIUM BLD-SCNC: 4.2 MMOL/L (ref 3.5–5.2)
PROT SERPL-MCNC: 7.5 G/DL (ref 6–8.5)
SODIUM BLD-SCNC: 136 MMOL/L (ref 136–145)
TRIGL SERPL-MCNC: 194 MG/DL (ref 0–150)
TSH SERPL DL<=0.05 MIU/L-ACNC: 1.66 UIU/ML (ref 0.27–4.2)
VLDLC SERPL-MCNC: 38.8 MG/DL (ref 5–40)

## 2020-06-30 ENCOUNTER — OFFICE VISIT (OUTPATIENT)
Dept: INTERNAL MEDICINE | Facility: CLINIC | Age: 70
End: 2020-06-30

## 2020-06-30 VITALS
WEIGHT: 145 LBS | HEART RATE: 70 BPM | SYSTOLIC BLOOD PRESSURE: 136 MMHG | DIASTOLIC BLOOD PRESSURE: 72 MMHG | BODY MASS INDEX: 25.69 KG/M2 | HEIGHT: 63 IN | TEMPERATURE: 97.1 F

## 2020-06-30 DIAGNOSIS — I10 MILD ESSENTIAL HYPERTENSION: ICD-10-CM

## 2020-06-30 DIAGNOSIS — F51.01 PRIMARY INSOMNIA: ICD-10-CM

## 2020-06-30 DIAGNOSIS — F41.1 GENERALIZED ANXIETY DISORDER: ICD-10-CM

## 2020-06-30 DIAGNOSIS — E55.9 VITAMIN D DEFICIENCY: ICD-10-CM

## 2020-06-30 DIAGNOSIS — F32.4 MAJOR DEPRESSIVE DISORDER WITH SINGLE EPISODE, IN PARTIAL REMISSION (HCC): ICD-10-CM

## 2020-06-30 DIAGNOSIS — E78.2 MIXED HYPERLIPIDEMIA: Primary | ICD-10-CM

## 2020-06-30 PROCEDURE — 99214 OFFICE O/P EST MOD 30 MIN: CPT | Performed by: INTERNAL MEDICINE

## 2020-06-30 PROCEDURE — 93000 ELECTROCARDIOGRAM COMPLETE: CPT | Performed by: INTERNAL MEDICINE

## 2020-06-30 NOTE — PATIENT INSTRUCTIONS
Patient Instructions   Problem List Items Addressed This Visit        Cardiovascular and Mediastinum    Mixed hyperlipidemia - Primary    Overview     6/30/2020 Yi Edouard MD    Continue to improve low-fat and low sugar diet.  Increase exercise and physical activity.  Join an exercise class that meets 2 or 3 times a week.         Mild essential hypertension    Overview     6/30/2020 Yi Edouard MD    Continue lifestyle control of the blood pressure by avoiding salt in the diet and avoiding sodas and getting regular exercise.            Digestive    Vitamin D deficiency    Overview     6/30/2020 Yi Edouard MD    Continue current dose of vitamin D.            Other    Primary insomnia    Overview     6/30/2020 Yi Edouard MD    Continue temazepam as needed.    We discussed sleep hygiene including going to bed at the same time and getting up at the same time every day, going to bed early enough to get 7 or 8 hours in bed, reading and relaxing before bedtime, and avoiding the TV, computer, phone, iPad close to bedtime.           Relevant Medications    temazepam (RESTORIL) 15 MG capsule    Generalized anxiety disorder    Overview     6/30/2020 Yi Edouard MD    Continue venlafaxine daily and clonazepam in the evenings as needed.  Continue regular exercise and physical activity.          Relevant Medications    venlafaxine XR (EFFEXOR-XR) 75 MG 24 hr capsule    temazepam (RESTORIL) 15 MG capsule    clonazePAM (KlonoPIN) 0.5 MG tablet    Major depressive disorder with single episode, in partial remission (CMS/HCC)    Overview     6/30/2020 Yi Edouard MD    Continue venlafaxine daily and clonazepam in the evenings as needed.  Continue regular exercise and physical activity.           Relevant Medications    venlafaxine XR (EFFEXOR-XR) 75 MG 24 hr capsule    temazepam (RESTORIL) 15 MG capsule             Mindfulness-Based Stress Reduction  Mindfulness-based stress reduction (MBSR) is a  program that helps people learn to practice mindfulness. Mindfulness is the practice of intentionally paying attention to the present moment. It can be learned and practiced through techniques such as education, breathing exercises, meditation, and yoga. MBSR includes several mindfulness techniques in one program.  MBSR works best when you understand the treatment, are willing to try new things, and can commit to spending time practicing what you learn. MBSR training may include learning about:  · How your emotions, thoughts, and reactions affect your body.  · New ways to respond to things that cause negative thoughts to start (triggers).  · How to notice your thoughts and let go of them.  · Practicing awareness of everyday things that you normally do without thinking.  · The techniques and goals of different types of meditation.  What are the benefits of MBSR?  MBSR can have many benefits, which include helping you to:  · Develop self-awareness. This refers to knowing and understanding yourself.  · Learn skills and attitudes that help you to participate in your own health care.  · Learn new ways to care for yourself.  · Be more accepting about how things are, and let things go.  · Be less judgmental and approach things with an open mind.  · Be patient with yourself and trust yourself more.  MBSR has also been shown to:  · Reduce negative emotions, such as depression and anxiety.  · Improve memory and focus.  · Change how you sense and approach pain.  · Boost your body's ability to fight infections.  · Help you connect better with other people.  · Improve your sense of well-being.  Follow these instructions at home:    · Find a local in-person or online MBSR program.  · Set aside some time regularly for mindfulness practice.  · Find a mindfulness practice that works best for you. This may include one or more of the following:  ? Meditation. Meditation involves focusing your mind on a certain thought or  activity.  ? Breathing awareness exercises. These help you to stay present by focusing on your breath.  ? Body scan. For this practice, you lie down and pay attention to each part of your body from head to toe. You can identify tension and soreness and intentionally relax parts of your body.  ? Yoga. Yoga involves stretching and breathing, and it can improve your ability to move and be flexible. It can also provide an experience of testing your body's limits, which can help you release stress.  ? Mindful eating. This way of eating involves focusing on the taste, texture, color, and smell of each bite of food. Because this slows down eating and helps you feel full sooner, it can be an important part of a weight-loss plan.  · Find a podcast or recording that provides guidance for breathing awareness, body scan, or meditation exercises. You can listen to these any time when you have a free moment to rest without distractions.  · Follow your treatment plan as told by your health care provider. This may include taking regular medicines and making changes to your diet or lifestyle as recommended.  How to practice mindfulness  To do a basic awareness exercise:  · Find a comfortable place to sit.  · Pay attention to the present moment. Observe your thoughts, feelings, and surroundings just as they are.  · Avoid placing judgment on yourself, your feelings, or your surroundings. Make note of any judgment that comes up, and let it go.  · Your mind may wander, and that is okay. Make note of when your thoughts drift, and return your attention to the present moment.  To do basic mindfulness meditation:  · Find a comfortable place to sit. This may include a stable chair or a firm floor cushion.  ? Sit upright with your back straight. Let your arms fall next to your side with your hands resting on your legs.  ? If sitting in a chair, rest your feet flat on the floor.  ? If sitting on a cushion, cross your legs in front of  you.  · Keep your head in a neutral position with your chin dropped slightly. Relax your jaw and rest the tip of your tongue on the roof of your mouth. Drop your gaze to the floor. You can close your eyes if you like.  · Breathe normally and pay attention to your breath. Feel the air moving in and out of your nose. Feel your belly expanding and relaxing with each breath.  · Your mind may wander, and that is okay. Make note of when your thoughts drift, and return your attention to your breath.  · Avoid placing judgment on yourself, your feelings, or your surroundings. Make note of any judgment or feelings that come up, let them go, and bring your attention back to your breath.  · When you are ready, lift your gaze or open your eyes. Pay attention to how your body feels after the meditation.  Where to find more information  You can find more information about MBSR from:  · Your health care provider.  · Community-based meditation centers or programs.  · Programs offered near you.  Summary  · Mindfulness-based stress reduction (MBSR) is a program that teaches you how to intentionally pay attention to the present moment. It is used with other treatments to help you cope better with daily stress, emotions, and pain.  · MBSR focuses on developing self-awareness, which allows you to respond to life stress without judgment or negative emotions.  · MBSR programs may involve learning different mindfulness practices, such as breathing exercises, meditation, yoga, body scan, or mindful eating. Find a mindfulness practice that works best for you, and set aside time for it on a regular basis.  This information is not intended to replace advice given to you by your health care provider. Make sure you discuss any questions you have with your health care provider.  Document Released: 04/26/2018 Document Revised: 11/30/2018 Document Reviewed: 04/26/2018  Elsevier Patient Education © 2020 [employer]

## 2020-06-30 NOTE — PROGRESS NOTES
[facility]     Jen SANAM May  1950   [id number]      Patient Care Team:  Yi Edouard MD as PCP - General (Internal Medicine)  Yi Edouard MD as PCP - Claims Attributed  Pierre Carreno MD as Consulting Physician (Gastroenterology)    Chief Complaint   Patient presents with   • Hyperlipidemia     f/u   • Hypertension            HPI  Patient is a 69 y.o. female presents with follow up hypertension,hyperlipidemia,anxiety insomnia    CHRONIC CONDITIONS  Anxiety worse with Covid-19 but doing better now that back to work. Not depressed. Sleeping ok with temazepam and clonazepam.  She finds that if she tries to leave off the evening clonazepam, she is more anxious and jittery the next morning.  As long as she takes at about 9 PM she feels good the next morning.  No side effects.  Taking venlafaxine daily.    Blood pressure is controlled by lifestyle.  She does try to limit salt in the diet most of the time.    For hyperlipidemia, she does try to eat low-fat healthy diet.  She usually gets a lot of steps every day at work.  She is considering doing an exercise class at the  as well.    She takes vitamin D regularly.    Past Medical History:   Diagnosis Date   • Anxiety    • Benign essential hypertension with target blood pressure below 140/90    • Depression    • Generalized anxiety disorder with panic attacks    • Generalized anxiety disorder with panic attacks 1/7/2019   • Major depressive disorder with single episode    • Mixed hyperlipidemia    • Primary insomnia    • Rectal pain 4/2/2019   • Stroke (CMS/[facility]) 2008    R hemispheric Stroke   • Vertigo    • Vitamin D deficiency        Past Surgical History:   Procedure Laterality Date   • AUGMENTATION MAMMAPLASTY  1990   • BREAST AUGMENTATION Bilateral 1989   • HYSTERECTOMY  1995    age 41   • OOPHORECTOMY     • PARTIAL KNEE ARTHROPLASTY  2015    Partial knee replacement 2 years ago       History reviewed. No pertinent family  "history.    Social History     Socioeconomic History   • Marital status:      Spouse name: Not on file   • Number of children: Not on file   • Years of education: Not on file   • Highest education level: Not on file   Tobacco Use   • Smoking status: Never Smoker   • Smokeless tobacco: Never Used   Substance and Sexual Activity   • Alcohol use: Yes     Frequency: Never     Comment: socially    • Drug use: No   • Sexual activity: Defer       No Known Allergies    Review of Systems:     Review of Systems   Constitutional: Negative for chills, fatigue and fever.   HENT: Negative for sore throat and swollen glands.    Eyes: Negative for visual disturbance.   Respiratory: Negative for cough, shortness of breath and wheezing.    Cardiovascular: Negative for chest pain, palpitations and leg swelling.   Gastrointestinal: Negative for abdominal pain, blood in stool, constipation and diarrhea.   Endocrine: Negative for cold intolerance and heat intolerance.   Genitourinary: Negative for dysuria and frequency.   Musculoskeletal: Negative for arthralgias and back pain.   Skin: Negative for rash and skin lesions.   Allergic/Immunologic: Negative for food allergies.   Neurological: Negative for dizziness and headache.   Hematological: Negative for adenopathy. Does not bruise/bleed easily.   Psychiatric/Behavioral: Positive for sleep disturbance and stress. Negative for suicidal ideas and depressed mood. The patient is nervous/anxious.        Vital Signs  Vitals:    06/30/20 1236 06/30/20 1311   BP: 146/94 136/72   BP Location: Left arm Right arm   Patient Position: Sitting Sitting   Cuff Size: Adult Adult   Pulse: 70    Temp: 97.1 °F (36.2 °C)    TempSrc: Infrared    Weight: 65.8 kg (145 lb)    Height: 160 cm (62.99\")    PainSc: 0-No pain      Body mass index is 25.69 kg/m².      Current Outpatient Medications:   •  clonazePAM (KlonoPIN) 0.5 MG tablet, Take 1 tablet by mouth At Night As Needed for Anxiety., Disp: 30 tablet, " Rfl: 2  •  estradiol (CLIMARA) 0.0375 MG/24HR, PLACE 1 PATCH ON THE SKIN AS DIRECTED BY PROVIDER 1 (ONE) TIME PER WEEK., Disp: 5 patch, Rfl: 5  •  temazepam (RESTORIL) 15 MG capsule, TAKE 1-2 CAPSULES BY ORAL ROUTE AT BEDTIME AS NEEDED, Disp: 60 capsule, Rfl: 2  •  venlafaxine XR (EFFEXOR-XR) 75 MG 24 hr capsule, TAKE ONE CAPSULE BY MOUTH EVERY MORNING, Disp: 30 capsule, Rfl: 5  •  vitamin D (ERGOCALCIFEROL) 1.25 MG (67681 UT) capsule capsule, TAKE ONE CAPSULE BY MOUTH ONE TIME PER WEEK, Disp: 12 capsule, Rfl: 3    Physical Exam:    Physical Exam   Constitutional: She is oriented to person, place, and time. She appears well-developed and well-nourished.   Eyes: Pupils are equal, round, and reactive to light. Conjunctivae and EOM are normal.   Neck: Normal range of motion. Neck supple. No thyromegaly present.   Cardiovascular: Normal rate, regular rhythm, normal heart sounds and intact distal pulses.   No murmur heard.  Pulmonary/Chest: Effort normal and breath sounds normal. She has no wheezes. Right breast exhibits no inverted nipple, no mass, no nipple discharge, no skin change and no tenderness. Left breast exhibits no inverted nipple, no mass, no nipple discharge, no skin change and no tenderness.   Abdominal: Soft. Bowel sounds are normal. She exhibits no distension and no mass. There is no tenderness.   Musculoskeletal: Normal range of motion. She exhibits no edema or tenderness.   Lymphadenopathy:     She has no cervical adenopathy.     She has no axillary adenopathy.   Neurological: She is alert and oriented to person, place, and time. She has normal strength. No cranial nerve deficit or sensory deficit. Coordination and gait normal.   Skin: Skin is warm and dry. No rash noted.   Psychiatric: Her speech is normal and behavior is normal. Judgment and thought content normal. Her mood appears anxious. Cognition and memory are normal.   Nursing note and vitals reviewed.      ECG 12 Lead  Date/Time: 6/30/2020  1:22 PM  Performed by: Yi Edouard MD  Authorized by: Yi Edouard MD   Comparison: compared with previous ECG   Similar to previous ECG  Rhythm: sinus rhythm  Rate: normal  BPM: 66  Conduction: conduction normal  ST Segments: ST segments normal  T Waves: T waves normal  QRS axis: normal    Clinical impression: normal ECG               ACE III MINI        Results Review:    I reviewed the patient's new clinical results.    CMP:  Lab Results   Component Value Date    BUN 15 06/23/2020    CREATININE 0.70 06/23/2020    EGFRIFNONA 83 06/23/2020    BCR 21.4 06/23/2020     06/23/2020    K 4.2 06/23/2020    CO2 25.9 06/23/2020    CALCIUM 9.4 06/23/2020    ALBUMIN 4.50 06/23/2020    BILITOT 0.3 06/23/2020    ALKPHOS 61 06/23/2020    AST 17 06/23/2020    ALT 11 06/23/2020     HbA1c:  No results found for: HGBA1C  Microalbumin:  Lab Results   Component Value Date    MICROALBUR <1.2 06/23/2020     Lipid Panel  Lab Results   Component Value Date    CHOL 247 (H) 06/23/2020    TRIG 194 (H) 06/23/2020    HDL 56 06/23/2020     (H) 06/23/2020    AST 17 06/23/2020    ALT 11 06/23/2020       Medication Review: Medications reviewed and noted  Patient Instructions   Problem List Items Addressed This Visit        Cardiovascular and Mediastinum    Mixed hyperlipidemia - Primary    Overview     6/30/2020 Yi Edouard MD    Continue to improve low-fat and low sugar diet.  Increase exercise and physical activity.  Join an exercise class that meets 2 or 3 times a week.         Mild essential hypertension    Overview     6/30/2020 Yi Edouard MD    Continue lifestyle control of the blood pressure by avoiding salt in the diet and avoiding sodas and getting regular exercise.            Digestive    Vitamin D deficiency    Overview     6/30/2020 Yi Edouard MD    Continue current dose of vitamin D.            Other    Primary insomnia    Overview     6/30/2020 Yi Edouard MD    Continue temazepam  as needed.    We discussed sleep hygiene including going to bed at the same time and getting up at the same time every day, going to bed early enough to get 7 or 8 hours in bed, reading and relaxing before bedtime, and avoiding the TV, computer, phone, iPad close to bedtime.           Relevant Medications    temazepam (RESTORIL) 15 MG capsule    Generalized anxiety disorder    Overview     6/30/2020 Yi Edouard MD    Continue venlafaxine daily and clonazepam in the evenings as needed.  Continue regular exercise and physical activity.          Relevant Medications    venlafaxine XR (EFFEXOR-XR) 75 MG 24 hr capsule    temazepam (RESTORIL) 15 MG capsule    clonazePAM (KlonoPIN) 0.5 MG tablet    Major depressive disorder with single episode, in partial remission (CMS/HCC)    Overview     6/30/2020 Yi Edouard MD    Continue venlafaxine daily and clonazepam in the evenings as needed.  Continue regular exercise and physical activity.           Relevant Medications    venlafaxine XR (EFFEXOR-XR) 75 MG 24 hr capsule    temazepam (RESTORIL) 15 MG capsule             Diagnosis Plan   1. Mixed hyperlipidemia     2. Mild essential hypertension     3. Primary insomnia     4. Generalized anxiety disorder     5. Vitamin D deficiency     6. Major depressive disorder with single episode, in partial remission (CMS/HCC)         Plan of care reviewed with patient at the conclusion of today's visit. Education was provided regarding diagnosis, management, and any prescribed or recommended OTC medications.Patient verbalizes understanding of and agreement with management plan.         Yi Edouard MD

## 2020-07-02 RX ORDER — VENLAFAXINE HYDROCHLORIDE 75 MG/1
CAPSULE, EXTENDED RELEASE ORAL
Qty: 30 CAPSULE | Refills: 5 | Status: SHIPPED | OUTPATIENT
Start: 2020-07-02 | End: 2020-12-16

## 2020-07-24 DIAGNOSIS — F41.1 GENERALIZED ANXIETY DISORDER: ICD-10-CM

## 2020-07-24 RX ORDER — CLONAZEPAM 0.5 MG/1
0.5 TABLET ORAL NIGHTLY PRN
Qty: 30 TABLET | Refills: 2 | Status: SHIPPED | OUTPATIENT
Start: 2020-07-24 | End: 2020-10-23 | Stop reason: SDUPTHER

## 2020-07-24 RX ORDER — CLONAZEPAM 0.5 MG/1
0.5 TABLET ORAL NIGHTLY PRN
Qty: 30 TABLET | Refills: 2 | OUTPATIENT
Start: 2020-07-24

## 2020-07-24 NOTE — TELEPHONE ENCOUNTER
Last seen 6/30    Next appt 12/29    Last filled 4/27  #30 w/ 2RF    Darshan printed for review    Req# [id number]

## 2020-07-24 NOTE — TELEPHONE ENCOUNTER
Last seen 6/30    Next appt 12/29    Last filled 4/27  #30 w/ 2RF    Darshan printed for review    Req# [id number]

## 2020-08-18 ENCOUNTER — TELEPHONE (OUTPATIENT)
Dept: INTERNAL MEDICINE | Facility: CLINIC | Age: 70
End: 2020-08-18

## 2020-08-18 RX ORDER — FLUCONAZOLE 150 MG/1
150 TABLET ORAL ONCE
Qty: 1 TABLET | Refills: 0 | Status: SHIPPED | OUTPATIENT
Start: 2020-08-18 | End: 2020-08-18

## 2020-08-18 NOTE — TELEPHONE ENCOUNTER
Patient requesting Diflucan to treat yeast infection.    Preferred Pharmacy:  [id number] - [place] - [address]  - [phone]  - [phone]     Please call back and advise@ [phone]

## 2020-10-15 DIAGNOSIS — F51.01 PRIMARY INSOMNIA: ICD-10-CM

## 2020-10-15 RX ORDER — TEMAZEPAM 15 MG/1
CAPSULE ORAL
Qty: 60 CAPSULE | Refills: 2 | Status: SHIPPED | OUTPATIENT
Start: 2020-10-15 | End: 2021-04-07 | Stop reason: SDUPTHER

## 2020-10-15 NOTE — TELEPHONE ENCOUNTER
Caller: May Jen SANAM    Relationship: Self    Best call back number [phone]  Medication needed:   Requested Prescriptions     Pending Prescriptions Disp Refills   • temazepam (RESTORIL) 15 MG capsule 60 capsule 2     Sig: TAKE 1-2 CAPSULES BY ORAL ROUTE AT BEDTIME AS NEEDED       When do you need the refill by: ASAP    What details did the patient provide when requesting the medication:     Does the patient have less than a 3 day supply:  [x] Yes  [] No    What is the patient's preferred pharmacy:   [id number] - [place], [place] - [address]  - [phone]  - [phone] FX

## 2020-10-23 DIAGNOSIS — F41.1 GENERALIZED ANXIETY DISORDER: ICD-10-CM

## 2020-10-23 RX ORDER — CLONAZEPAM 0.5 MG/1
0.5 TABLET ORAL NIGHTLY PRN
Qty: 30 TABLET | Refills: 2 | Status: SHIPPED | OUTPATIENT
Start: 2020-10-23 | End: 2020-12-30 | Stop reason: SDUPTHER

## 2020-12-04 ENCOUNTER — TELEPHONE (OUTPATIENT)
Dept: INTERNAL MEDICINE | Facility: CLINIC | Age: 70
End: 2020-12-04

## 2020-12-04 NOTE — TELEPHONE ENCOUNTER
PT STATED THAT SHE IS HAVING A LOT OF ANXIETY AND CHEST TIGHTEN AND PT WOULD LIKE TO KNOW IF SHE SHOULD TAKE A HALF CLONAZEPAM OR VALIUM (WOULD NEED A PRESCRIPTION IF THIS IS THE BEST CHOICE). PT STATED SHE IS JUST NOT SURE OF THE BEST CHOICE TO MAKE AND WOULD LIKE THE DR TO CONTACT HER ABOUT HER QUESTIONS AND CONCERN.    CALLBACK:[phone]

## 2020-12-04 NOTE — TELEPHONE ENCOUNTER
She may try half dose of clonazepam as needed.  She has tolerated it in the past.  Exercise and walking also help.  Relaxation techniques help.  If not improving, let us know on Monday and we will work her in for a visit next week.

## 2020-12-09 RX ORDER — ESTRADIOL 0.04 MG/D
1 PATCH TRANSDERMAL WEEKLY
Qty: 5 PATCH | Refills: 5 | Status: SHIPPED | OUTPATIENT
Start: 2020-12-09 | End: 2021-01-03 | Stop reason: SDUPTHER

## 2020-12-16 RX ORDER — VENLAFAXINE HYDROCHLORIDE 75 MG/1
CAPSULE, EXTENDED RELEASE ORAL
Qty: 30 CAPSULE | Refills: 5 | Status: SHIPPED | OUTPATIENT
Start: 2020-12-16 | End: 2021-01-03 | Stop reason: SDUPTHER

## 2020-12-30 ENCOUNTER — OFFICE VISIT (OUTPATIENT)
Dept: INTERNAL MEDICINE | Facility: CLINIC | Age: 70
End: 2020-12-30

## 2020-12-30 ENCOUNTER — LAB (OUTPATIENT)
Dept: LAB | Facility: HOSPITAL | Age: 70
End: 2020-12-30

## 2020-12-30 VITALS
HEIGHT: 63 IN | DIASTOLIC BLOOD PRESSURE: 82 MMHG | TEMPERATURE: 97.3 F | WEIGHT: 148 LBS | SYSTOLIC BLOOD PRESSURE: 150 MMHG | HEART RATE: 74 BPM | BODY MASS INDEX: 26.22 KG/M2

## 2020-12-30 DIAGNOSIS — K21.9 GASTROESOPHAGEAL REFLUX DISEASE WITHOUT ESOPHAGITIS: Chronic | ICD-10-CM

## 2020-12-30 DIAGNOSIS — F41.1 GENERALIZED ANXIETY DISORDER: ICD-10-CM

## 2020-12-30 DIAGNOSIS — I10 MILD ESSENTIAL HYPERTENSION: ICD-10-CM

## 2020-12-30 DIAGNOSIS — F32.4 MAJOR DEPRESSIVE DISORDER WITH SINGLE EPISODE, IN PARTIAL REMISSION (HCC): ICD-10-CM

## 2020-12-30 DIAGNOSIS — H93.12 TINNITUS OF LEFT EAR: Chronic | ICD-10-CM

## 2020-12-30 DIAGNOSIS — E78.2 MIXED HYPERLIPIDEMIA: ICD-10-CM

## 2020-12-30 DIAGNOSIS — F41.1 GENERALIZED ANXIETY DISORDER: Primary | ICD-10-CM

## 2020-12-30 LAB
ALBUMIN SERPL-MCNC: 4.6 G/DL (ref 3.5–5.2)
ALBUMIN/GLOB SERPL: 1.8 G/DL
ALP SERPL-CCNC: 64 U/L (ref 39–117)
ALT SERPL W P-5'-P-CCNC: 12 U/L (ref 1–33)
ANION GAP SERPL CALCULATED.3IONS-SCNC: 7.5 MMOL/L (ref 5–15)
AST SERPL-CCNC: 19 U/L (ref 1–32)
BASOPHILS # BLD AUTO: 0.07 10*3/MM3 (ref 0–0.2)
BASOPHILS NFR BLD AUTO: 0.8 % (ref 0–1.5)
BILIRUB SERPL-MCNC: 0.3 MG/DL (ref 0–1.2)
BUN SERPL-MCNC: 24 MG/DL (ref 8–23)
BUN/CREAT SERPL: 33.3 (ref 7–25)
CALCIUM SPEC-SCNC: 9.2 MG/DL (ref 8.6–10.5)
CHLORIDE SERPL-SCNC: 102 MMOL/L (ref 98–107)
CO2 SERPL-SCNC: 26.5 MMOL/L (ref 22–29)
CREAT SERPL-MCNC: 0.72 MG/DL (ref 0.57–1)
DEPRECATED RDW RBC AUTO: 42.7 FL (ref 37–54)
EOSINOPHIL # BLD AUTO: 0.46 10*3/MM3 (ref 0–0.4)
EOSINOPHIL NFR BLD AUTO: 5.4 % (ref 0.3–6.2)
ERYTHROCYTE [DISTWIDTH] IN BLOOD BY AUTOMATED COUNT: 13.3 % (ref 12.3–15.4)
GFR SERPL CREATININE-BSD FRML MDRD: 80 ML/MIN/1.73
GLOBULIN UR ELPH-MCNC: 2.6 GM/DL
GLUCOSE SERPL-MCNC: 80 MG/DL (ref 65–99)
HCT VFR BLD AUTO: 40.8 % (ref 34–46.6)
HGB BLD-MCNC: 13.4 G/DL (ref 12–15.9)
IMM GRANULOCYTES # BLD AUTO: 0.06 10*3/MM3 (ref 0–0.05)
IMM GRANULOCYTES NFR BLD AUTO: 0.7 % (ref 0–0.5)
LYMPHOCYTES # BLD AUTO: 3.12 10*3/MM3 (ref 0.7–3.1)
LYMPHOCYTES NFR BLD AUTO: 36.5 % (ref 19.6–45.3)
MCH RBC QN AUTO: 28.7 PG (ref 26.6–33)
MCHC RBC AUTO-ENTMCNC: 32.8 G/DL (ref 31.5–35.7)
MCV RBC AUTO: 87.4 FL (ref 79–97)
MONOCYTES # BLD AUTO: 0.72 10*3/MM3 (ref 0.1–0.9)
MONOCYTES NFR BLD AUTO: 8.4 % (ref 5–12)
NEUTROPHILS NFR BLD AUTO: 4.12 10*3/MM3 (ref 1.7–7)
NEUTROPHILS NFR BLD AUTO: 48.2 % (ref 42.7–76)
NRBC BLD AUTO-RTO: 0 /100 WBC (ref 0–0.2)
PLATELET # BLD AUTO: 346 10*3/MM3 (ref 140–450)
PMV BLD AUTO: 9.6 FL (ref 6–12)
POTASSIUM SERPL-SCNC: 4.3 MMOL/L (ref 3.5–5.2)
PROT SERPL-MCNC: 7.2 G/DL (ref 6–8.5)
RBC # BLD AUTO: 4.67 10*6/MM3 (ref 3.77–5.28)
SODIUM SERPL-SCNC: 136 MMOL/L (ref 136–145)
TSH SERPL DL<=0.05 MIU/L-ACNC: 1.97 UIU/ML (ref 0.27–4.2)
WBC # BLD AUTO: 8.55 10*3/MM3 (ref 3.4–10.8)

## 2020-12-30 PROCEDURE — 99214 OFFICE O/P EST MOD 30 MIN: CPT | Performed by: INTERNAL MEDICINE

## 2020-12-30 PROCEDURE — 84443 ASSAY THYROID STIM HORMONE: CPT

## 2020-12-30 PROCEDURE — 80053 COMPREHEN METABOLIC PANEL: CPT

## 2020-12-30 PROCEDURE — 85025 COMPLETE CBC W/AUTO DIFF WBC: CPT

## 2020-12-30 PROCEDURE — G0008 ADMIN INFLUENZA VIRUS VAC: HCPCS | Performed by: INTERNAL MEDICINE

## 2020-12-30 PROCEDURE — 90694 VACC AIIV4 NO PRSRV 0.5ML IM: CPT | Performed by: INTERNAL MEDICINE

## 2020-12-30 RX ORDER — FLUCONAZOLE 150 MG/1
TABLET ORAL
COMMUNITY
End: 2021-01-04

## 2020-12-30 RX ORDER — VENLAFAXINE HYDROCHLORIDE 37.5 MG/1
37.5 CAPSULE, EXTENDED RELEASE ORAL DAILY
Qty: 30 CAPSULE | Refills: 5 | Status: SHIPPED | OUTPATIENT
Start: 2020-12-30 | End: 2021-03-11

## 2020-12-30 RX ORDER — CLONAZEPAM 0.5 MG/1
0.5 TABLET ORAL NIGHTLY PRN
Qty: 30 TABLET | Refills: 2 | Status: SHIPPED | OUTPATIENT
Start: 2020-12-30 | End: 2021-04-14 | Stop reason: SDUPTHER

## 2020-12-30 RX ORDER — FAMOTIDINE 10 MG
10 TABLET ORAL DAILY
COMMUNITY
End: 2022-01-03 | Stop reason: SDUPTHER

## 2020-12-30 NOTE — PATIENT INSTRUCTIONS
Patient Instructions   Problem List Items Addressed This Visit        Cardiac and Vasculature    Mixed hyperlipidemia    Overview     12/30/2020 Yi Edouard MD    Continue to improve low-fat and low sugar diet.  Increase exercise and physical activity.  Join an exercise class online that meets 2 or 3 times a week or go to the gym.         Mild essential hypertension    Overview     12/30/2020 Yi Edouard MD    Continue lifestyle control of the blood pressure by avoiding salt in the diet and avoiding sodas and getting regular exercise.    Try to check the blood pressure some at home and write down the values to bring to the next appointment.  Goal is to keep the blood pressure less than 130/80.         Relevant Orders    CBC & Differential       ENT    Tinnitus of left ear (Chronic)    Overview     12/30/2020 Yi Edouard MD    Do auto inflation (hold the nose and blow) multiple times a day.  May take Mucinex twice a day as needed to decrease pressure in the eustachian tube.            Gastrointestinal Abdominal     Gastroesophageal reflux disease without esophagitis (Chronic)    Overview     12/30/2020 Yi Edouard MD    Continue famotidine twice a day.  Continue to avoid eating close to bedtime and avoid large meals.         Relevant Medications    famotidine (PEPCID) 10 MG tablet       Mental Health    Generalized anxiety disorder - Primary    Overview     12/30/2020 Yi Edouard MD    Increase venlafaxine daily to 112.5 mg every evening (a 37.5 and a 75 mg tablet) and continue clonazepam in the evenings as needed.  Continue regular exercise and physical activity.          Relevant Medications    temazepam (RESTORIL) 15 MG capsule    venlafaxine XR (EFFEXOR-XR) 75 MG 24 hr capsule    clonazePAM (KlonoPIN) 0.5 MG tablet    venlafaxine XR (EFFEXOR-XR) 37.5 MG 24 hr capsule    Other Relevant Orders    Comprehensive Metabolic Panel    TSH    Major depressive disorder with single episode, in  partial remission (CMS/HCC)    Overview     12/30/2020 Yi Edouard MD    Increase venlafaxine daily to 112.5 mg every evening (a 37.5 and a 75 mg tablet) and continue clonazepam in the evenings as needed.  Continue regular exercise and physical activity.          Relevant Medications    temazepam (RESTORIL) 15 MG capsule    venlafaxine XR (EFFEXOR-XR) 75 MG 24 hr capsule    venlafaxine XR (EFFEXOR-XR) 37.5 MG 24 hr capsule

## 2020-12-30 NOTE — PROGRESS NOTES
[facility]     [employer] May  1950   [id number]      Patient Care Team:  Yi Edouard MD as PCP - General (Internal Medicine)  Pierre Carreno MD as Consulting Physician (Gastroenterology)    Chief Complaint   Patient presents with   • Tinnitus     been going on for awhile   • Anxiety     unusually high            HPI  Patient is a 70 y.o. female presents with more anxious last few months since Covid. . Onset of symptoms was few months ago but getting worse.  Chronicity chronic. Severity moderate to severe intermittently.  Symptoms are associated with sweating and feeling hot. Pertinent negatives not more depressed. Sleeping ok..   Symptoms are aggravated by feeling depressed or rushed.   Symptoms improve with trying to relax.  Context venlafaxine does help a lot.       HPI  Tinnitus in L ear is chronic but worse lately. Hearing is worse in that ear than R  Chronically. No ear pain.     CHRONIC CONDITIONS  Famotidine works well for midepigastric pain/heartburn.    Has not checked BP at home lately.  She does try to avoid salt in the diet, but she does eat out a lot.    Past Medical History:   Diagnosis Date   • Anxiety    • Benign essential hypertension with target blood pressure below 140/90    • Depression    • Generalized anxiety disorder with panic attacks    • Generalized anxiety disorder with panic attacks 1/7/2019   • Major depressive disorder with single episode    • Mixed hyperlipidemia    • Primary insomnia    • Rectal pain 4/2/2019   • Stroke (CMS/HCC) 2008    R hemispheric Stroke   • Vertigo    • Vitamin D deficiency        Past Surgical History:   Procedure Laterality Date   • AUGMENTATION MAMMAPLASTY  1990   • BREAST AUGMENTATION Bilateral 1989   • HYSTERECTOMY  1995    age 41   • OOPHORECTOMY     • PARTIAL KNEE ARTHROPLASTY  2015    Partial knee replacement 2 years ago       History reviewed. No pertinent family history.    Social History     Socioeconomic History   • Marital  "status:      Spouse name: Not on file   • Number of children: Not on file   • Years of education: Not on file   • Highest education level: Not on file   Tobacco Use   • Smoking status: Never Smoker   • Smokeless tobacco: Never Used   Substance and Sexual Activity   • Alcohol use: Yes     Frequency: Never     Comment: socially    • Drug use: No   • Sexual activity: Defer       No Known Allergies    Review of Systems:     Review of Systems   Constitutional: Negative for chills, fatigue and fever.   HENT: Positive for hearing loss and tinnitus. Negative for congestion, ear discharge and postnasal drip.         Tinnitus and hearing loss in the left ear are chronic   Respiratory: Negative for cough, shortness of breath and wheezing.    Cardiovascular: Negative for chest pain, palpitations and leg swelling.   Gastrointestinal: Negative for abdominal pain, blood in stool, constipation and diarrhea.   Psychiatric/Behavioral: Positive for sleep disturbance and stress. Negative for depressed mood. The patient is nervous/anxious.        Vital Signs  Vitals:    12/30/20 1420 12/30/20 1508   BP: 171/67 150/82   BP Location: Left arm    Patient Position: Sitting    Cuff Size: Adult    Pulse: 74    Temp: 97.3 °F (36.3 °C)    TempSrc: Infrared    Weight: 67.1 kg (148 lb)    Height: 160 cm (62.99\")    PainSc: 0-No pain      Body mass index is 26.23 kg/m².      Current Outpatient Medications:   •  clonazePAM (KlonoPIN) 0.5 MG tablet, Take 1 tablet by mouth At Night As Needed for Anxiety., Disp: 30 tablet, Rfl: 2  •  estradiol (CLIMARA) 0.0375 MG/24HR, Place 1 patch on the skin as directed by provider 1 (One) Time Per Week., Disp: 5 patch, Rfl: 5  •  famotidine (PEPCID) 10 MG tablet, Take 10 mg by mouth 2 (Two) Times a Day., Disp: , Rfl:   •  fluconazole (Diflucan) 150 MG tablet, , Disp: , Rfl:   •  temazepam (RESTORIL) 15 MG capsule, TAKE 1-2 CAPSULES BY ORAL ROUTE AT BEDTIME AS NEEDED, Disp: 60 capsule, Rfl: 2  •  venlafaxine " XR (EFFEXOR-XR) 75 MG 24 hr capsule, TAKE 1 CAPSULE BY MOUTH EVERY DAY IN THE MORNING (Patient taking differently: In the evening), Disp: 30 capsule, Rfl: 5  •  vitamin D (ERGOCALCIFEROL) 1.25 MG (74525 UT) capsule capsule, TAKE ONE CAPSULE BY MOUTH ONE TIME PER WEEK, Disp: 12 capsule, Rfl: 3  •  venlafaxine XR (EFFEXOR-XR) 37.5 MG 24 hr capsule, Take 1 capsule by mouth Daily., Disp: 30 capsule, Rfl: 5    Physical Exam:    Physical Exam  Vitals signs and nursing note reviewed.   Constitutional:       Appearance: She is well-developed.   HENT:      Head: Normocephalic.   Eyes:      Conjunctiva/sclera: Conjunctivae normal.      Pupils: Pupils are equal, round, and reactive to light.   Neck:      Musculoskeletal: Normal range of motion and neck supple.      Thyroid: No thyromegaly.   Cardiovascular:      Rate and Rhythm: Normal rate and regular rhythm.      Heart sounds: Normal heart sounds.   Pulmonary:      Effort: Pulmonary effort is normal.      Breath sounds: Normal breath sounds. No wheezing.   Musculoskeletal: Normal range of motion.   Lymphadenopathy:      Cervical: No cervical adenopathy.   Skin:     General: Skin is warm and dry.   Neurological:      Mental Status: She is alert and oriented to person, place, and time.   Psychiatric:         Attention and Perception: Attention normal.         Mood and Affect: Mood is anxious. Mood is not depressed.         Speech: Speech normal.         Behavior: Behavior normal.         Thought Content: Thought content normal.         Judgment: Judgment normal.          ACE III MINI        Results Review:    None    CMP:  Lab Results   Component Value Date    BUN 15 06/23/2020    CREATININE 0.70 06/23/2020    EGFRIFNONA 83 06/23/2020    BCR 21.4 06/23/2020     06/23/2020    K 4.2 06/23/2020    CO2 25.9 06/23/2020    CALCIUM 9.4 06/23/2020    ALBUMIN 4.50 06/23/2020    BILITOT 0.3 06/23/2020    ALKPHOS 61 06/23/2020    AST 17 06/23/2020    ALT 11 06/23/2020      HbA1c:  No results found for: HGBA1C  Microalbumin:  Lab Results   Component Value Date    MICROALBUR <1.2 06/23/2020     Lipid Panel  Lab Results   Component Value Date    CHOL 247 (H) 06/23/2020    TRIG 194 (H) 06/23/2020    HDL 56 06/23/2020     (H) 06/23/2020    AST 17 06/23/2020    ALT 11 06/23/2020       Medication Review: Medications reviewed and noted  Patient Instructions   Problem List Items Addressed This Visit        Cardiac and Vasculature    Mixed hyperlipidemia    Overview     12/30/2020 Yi Edouard MD    Continue to improve low-fat and low sugar diet.  Increase exercise and physical activity.  Join an exercise class online that meets 2 or 3 times a week or go to the gym.         Mild essential hypertension    Overview     12/30/2020 Yi Edouard MD    Continue lifestyle control of the blood pressure by avoiding salt in the diet and avoiding sodas and getting regular exercise.    Try to check the blood pressure some at home and write down the values to bring to the next appointment.  Goal is to keep the blood pressure less than 130/80.         Relevant Orders    CBC & Differential       ENT    Tinnitus of left ear (Chronic)    Overview     12/30/2020 Yi Edouard MD    Do auto inflation (hold the nose and blow) multiple times a day.  May take Mucinex twice a day as needed to decrease pressure in the eustachian tube.            Gastrointestinal Abdominal     Gastroesophageal reflux disease without esophagitis (Chronic)    Overview     12/30/2020 Yi Edouard MD    Continue famotidine twice a day.  Continue to avoid eating close to bedtime and avoid large meals.         Relevant Medications    famotidine (PEPCID) 10 MG tablet       Mental Health    Generalized anxiety disorder - Primary    Overview     12/30/2020 Yi Edouard MD    Increase venlafaxine daily to 112.5 mg every evening (a 37.5 and a 75 mg tablet) and continue clonazepam in the evenings as needed.   Continue regular exercise and physical activity.          Relevant Medications    temazepam (RESTORIL) 15 MG capsule    venlafaxine XR (EFFEXOR-XR) 75 MG 24 hr capsule    clonazePAM (KlonoPIN) 0.5 MG tablet    venlafaxine XR (EFFEXOR-XR) 37.5 MG 24 hr capsule    Other Relevant Orders    Comprehensive Metabolic Panel    TSH    Major depressive disorder with single episode, in partial remission (CMS/HCC)    Overview     12/30/2020 Yi Edouard MD    Increase venlafaxine daily to 112.5 mg every evening (a 37.5 and a 75 mg tablet) and continue clonazepam in the evenings as needed.  Continue regular exercise and physical activity.          Relevant Medications    temazepam (RESTORIL) 15 MG capsule    venlafaxine XR (EFFEXOR-XR) 75 MG 24 hr capsule    venlafaxine XR (EFFEXOR-XR) 37.5 MG 24 hr capsule             Diagnosis Plan   1. Generalized anxiety disorder  clonazePAM (KlonoPIN) 0.5 MG tablet    venlafaxine XR (EFFEXOR-XR) 37.5 MG 24 hr capsule    Comprehensive Metabolic Panel    TSH   2. Mild essential hypertension  CBC & Differential   3. Mixed hyperlipidemia     4. Major depressive disorder with single episode, in partial remission (CMS/HCC)     5. Tinnitus of left ear     6. Gastroesophageal reflux disease without esophagitis         Plan of care reviewed with patient at the conclusion of today's visit. Education was provided regarding diagnosis, management, and any prescribed or recommended OTC medications.Patient verbalizes understanding of and agreement with management plan.         Yi Edouard MD

## 2021-01-04 RX ORDER — ESTRADIOL 0.04 MG/D
1 PATCH TRANSDERMAL WEEKLY
Qty: 5 PATCH | Refills: 5 | Status: SHIPPED | OUTPATIENT
Start: 2021-01-04 | End: 2021-05-24

## 2021-01-04 RX ORDER — VENLAFAXINE HYDROCHLORIDE 75 MG/1
75 CAPSULE, EXTENDED RELEASE ORAL EVERY MORNING
Qty: 30 CAPSULE | Refills: 5 | Status: SHIPPED | OUTPATIENT
Start: 2021-01-04 | End: 2021-02-25 | Stop reason: SDUPTHER

## 2021-01-04 RX ORDER — FLUCONAZOLE 150 MG/1
TABLET ORAL
Qty: 3 TABLET | Refills: 0 | Status: SHIPPED | OUTPATIENT
Start: 2021-01-04 | End: 2021-03-11

## 2021-02-10 ENCOUNTER — TELEPHONE (OUTPATIENT)
Dept: INTERNAL MEDICINE | Facility: CLINIC | Age: 71
End: 2021-02-10

## 2021-02-10 NOTE — TELEPHONE ENCOUNTER
Mammogram ordered 06/23/20. Patient no showed her appt 11/11/20. Never rescheduled. Patient has been seen in office since. Can order be cancelled?

## 2021-02-25 RX ORDER — VENLAFAXINE HYDROCHLORIDE 75 MG/1
75 CAPSULE, EXTENDED RELEASE ORAL EVERY MORNING
Qty: 90 CAPSULE | Refills: 0 | Status: SHIPPED | OUTPATIENT
Start: 2021-02-25 | End: 2021-05-13

## 2021-03-09 ENCOUNTER — TELEPHONE (OUTPATIENT)
Dept: INTERNAL MEDICINE | Facility: CLINIC | Age: 71
End: 2021-03-09

## 2021-03-11 ENCOUNTER — OFFICE VISIT (OUTPATIENT)
Dept: OBSTETRICS AND GYNECOLOGY | Facility: CLINIC | Age: 71
End: 2021-03-11

## 2021-03-11 VITALS
HEIGHT: 63 IN | WEIGHT: 145.8 LBS | BODY MASS INDEX: 25.83 KG/M2 | DIASTOLIC BLOOD PRESSURE: 86 MMHG | TEMPERATURE: 97.3 F | SYSTOLIC BLOOD PRESSURE: 142 MMHG

## 2021-03-11 DIAGNOSIS — Z78.0 MENOPAUSE: Primary | ICD-10-CM

## 2021-03-11 DIAGNOSIS — N95.2 VAGINAL ATROPHY: ICD-10-CM

## 2021-03-11 DIAGNOSIS — A60.00 HERPES SIMPLEX INFECTION OF GENITOURINARY SYSTEM: ICD-10-CM

## 2021-03-11 DIAGNOSIS — N81.11 MIDLINE CYSTOCELE: ICD-10-CM

## 2021-03-11 PROCEDURE — G0101 CA SCREEN;PELVIC/BREAST EXAM: HCPCS | Performed by: OBSTETRICS & GYNECOLOGY

## 2021-03-11 PROCEDURE — 99203 OFFICE O/P NEW LOW 30 MIN: CPT | Performed by: OBSTETRICS & GYNECOLOGY

## 2021-03-11 RX ORDER — VALACYCLOVIR HYDROCHLORIDE 1 G/1
1000 TABLET, FILM COATED ORAL 2 TIMES DAILY
Qty: 10 TABLET | Refills: 0 | Status: SHIPPED | OUTPATIENT
Start: 2021-03-11 | End: 2021-03-16

## 2021-03-11 NOTE — PROGRESS NOTES
Chief Complaint   Patient presents with   • Gynecologic Exam     New GYN, re-establish care.  c/o prolapse.       Jen Cook is a 70 y.o. year old  presenting to be seen for her annual exam.  This is her first gynecologic wellness visit with me in several years.  This patient has previously had a laparoscopically assisted vaginal hysterectomy/bilateral salpingo-oophorectomy/Hoover procedure by Dr. Forte.  She has recently noted that she has some vaginal pressure at the end of a working day.  She is on her feet for 8 hours a day and notes pressure without bulging from the vagina.  She does not have stress urinary incontinence.  She does not have symptoms of urge incontinence.  She has some loose stools.  She has noted some vaginal discharge without itching or burning.  Dr. Edouard prescribes Climara patches, 0.0375 mg weekly.  She has no side effects on estrogen replacement therapy.  She denies menopausal symptoms.  She has developed an erythematous lesion of the right labia majora.  She does have a history of recurrent genital herpes infection.    SCREENING TESTS    Year 2012 2016 2017 2018 2019 2020 202 2022 2023 202 202 202 202   203 2033   Age                         PAP                         HPV high risk                         Mammogram      benign                   JOHANA score                         Breast MRI                         Lipids                         Vitamin D                         Colonoscopy                         DEXA  Frax (hip/any)                         Ovarian Screen                             She exercises regularly: yes.  She wears her seat belt: yes.  She has concerns about domestic violence: no.  She has noticed changes in height: no    GYN screening history:  · Last mammogram: was done on approximately 8/10/2017 and the result was: Birads II (Benign findings)..    No Additional Complaints Reported    The following portions  of the patient's history were reviewed and updated as appropriate:vital signs and   She  has a past medical history of Anxiety, Benign essential hypertension with target blood pressure below 140/90, Depression, Generalized anxiety disorder with panic attacks, Generalized anxiety disorder with panic attacks (1/7/2019), Major depressive disorder with single episode, Mixed hyperlipidemia, Post-menopause on HRT (hormone replacement therapy), Primary insomnia, Rectal pain (4/2/2019), Stroke (CMS/[facility]) (2008), Vertigo, and Vitamin D deficiency.  She does not have any pertinent problems on file.  She  has a past surgical history that includes Partial knee arthroplasty (2015); Breast Augmentation (Bilateral, 1989); Augmentation mammaplasty (1990); laparoscopic assisted vaginal hysterectomy salpingo oophorectomy (Bilateral); and Laparoscopic Hoover procedure.  Her She was adopted. Family history is unknown by patient.  She  reports that she has never smoked. She has never used smokeless tobacco. She reports current alcohol use. She reports that she does not use drugs.  Current Outpatient Medications   Medication Sig Dispense Refill   • clonazePAM (KlonoPIN) 0.5 MG tablet Take 1 tablet by mouth At Night As Needed for Anxiety. 30 tablet 2   • estradiol (CLIMARA) 0.0375 MG/24HR Place 1 patch on the skin as directed by provider 1 (One) Time Per Week. 5 patch 5   • famotidine (PEPCID) 10 MG tablet Take 10 mg by mouth 2 (Two) Times a Day.     • temazepam (RESTORIL) 15 MG capsule TAKE 1-2 CAPSULES BY ORAL ROUTE AT BEDTIME AS NEEDED 60 capsule 2   • venlafaxine XR (EFFEXOR-XR) 75 MG 24 hr capsule Take 1 capsule by mouth Every Morning for 90 days. 90 capsule 0   • vitamin D (ERGOCALCIFEROL) 1.25 MG (56172 UT) capsule capsule TAKE ONE CAPSULE BY MOUTH ONE TIME PER WEEK 12 capsule 3     No current facility-administered medications for this visit.     She has No Known Allergies..    Review of Systems  A review of systems was taken.  She  "denies cough, fever, shortness of breath, and loss of her sense of taste or smell  Constitutional: negative for fever, chills, activity change, appetite change, fatigue and unexpected weight change.  Respiratory: negative  Cardiovascular: negative  Gastrointestinal: positive for loose stools  Genitourinary:pelvic pressure  Musculoskeletal:negative  Behavioral/Psych: negative      Counseling/Anticipatory Guidance Discussed: nutrition, physical activity, healthy weight, immunizations, screenings and self-breast exam      /86   Temp 97.3 °F (36.3 °C)   Ht 160 cm (63\")   Wt 66.1 kg (145 lb 12.8 oz)   Breastfeeding No   BMI 25.83 kg/m²     MEDICALLY INDICATED   Physical Exam    General:  alert; cooperative; well developed; well nourished   Skin:  No suspicious lesions seen   Thyroid: normal to inspection and palpation   Lungs:  breathing is unlabored  clear to auscultation bilaterally   Heart:  regular rate and rhythm, S1, S2 normal, no murmur, click, rub or gallop  normal apical impulse   Breasts:  Examined in supine position  Symmetric without masses or skin dimpling  Nipples normal without inversion, lesions or discharge  There are no palpable axillary nodes  Bilateral implants are noted without obvious palpable abnormalities   Abdomen: soft, non-tender; no masses  no umbilical or inguinal hernias are present  no hepato-splenomegaly   Pelvis: Clinical staff was present for exam  External genitalia:  normal appearance of the external genitalia including Bartholin's and [place]'s glands.  Vaginal:  atrophic mucosal changes are present;  Cervix:  absent.  Uterus:  absent.  Adnexa:  absent, bilateral.  Rectal:  external hemorrhoids present;  Cystocele GRADE 1                              There is an acute ulcer of the lower right labia majora  Lab Review   CBC results, CMP results and TSH results    Imaging  Mammogram results        Advance directives- NO (the patient was given pamphlets about establishing " advanced directives)      ASSESSMENT  Problems Addressed this Visit        Genitourinary and Reproductive     Midline cystocele      Other Visit Diagnoses     Menopause    -  Primary    Vaginal atrophy          Diagnoses       Codes Comments    Menopause    -  Primary ICD-10-CM: Z78.0  ICD-9-CM: 627.2     Midline cystocele     ICD-10-CM: N81.11  ICD-9-CM: 618.01     Vaginal atrophy     ICD-10-CM: N95.2  ICD-9-CM: 627.3       Genital HSV      Midline cystocele is an Acute of chronic problem, newly diagnosed.  Prognosis is uncertain.  Genital HSV infection is an Acute problem with good prognosis with treatment  She does not have stress incontinence      Substance History:   reports that she has never smoked. She has never used smokeless tobacco.   reports current alcohol use.   reports no history of drug use.    Substance use counseling is not indicated based on patient history.      PLAN    · Medications prescribed this encounter:  No orders of the defined types were placed in this encounter.  · I have instructed the patient that she has a grade 1 cystocele and I do not feel that surgical intervention is necessary at this time.  I have counseled the patient to lean forward after she completes voiding to completely empty the bladder.  I counseled her to do Kegel's exercises with 15 tightenings 5 times a day.  I have advised her to avoid heavy lifting and straining and to avoid straining with bowel movements.  · I have prescribed compounded estradiol cream, 0.1 mg/gram in a dose of 0.5 g intravaginally twice a week to improve the vaginal tissue.  · Valacyclovir, 1000 mg by mouth twice a day for 5 days  · Calcium, 600 mg/ Vit. D, 400 IU daily; regular weight-bearing exercise  · Follow up: 3 month(s)  *Please note that portions of this documentation may have been completed with a voice recognition program.  Efforts were made to edit this dictation, but occasional words may have been mistranscribed.       This note was  electronically signed.    ALESSANDRA Tanner MD  March 11, 2021  09:29 EST

## 2021-04-07 DIAGNOSIS — F51.01 PRIMARY INSOMNIA: ICD-10-CM

## 2021-04-07 RX ORDER — TEMAZEPAM 15 MG/1
CAPSULE ORAL
Qty: 60 CAPSULE | Refills: 2 | Status: SHIPPED | OUTPATIENT
Start: 2021-04-07 | End: 2021-10-03 | Stop reason: SDUPTHER

## 2021-04-07 NOTE — TELEPHONE ENCOUNTER
Caller: Jen Cook    Relationship: Self    Best call back number: [phone]    Medication needed:   Requested Prescriptions     Pending Prescriptions Disp Refills   • temazepam (RESTORIL) 15 MG capsule 60 capsule 2     Sig: TAKE 1-2 CAPSULES BY ORAL ROUTE AT BEDTIME AS NEEDED       What additional details did the patient provide when requesting the medication: PATIENT HAS 1 PILL LEFT.     Does the patient have less than a 3 day supply:  [x] Yes  [] No    What is the patient's preferred pharmacy: [facility] #[id number] - [place], [place] - [address]  - [phone]  - [phone] FX

## 2021-04-14 DIAGNOSIS — F41.1 GENERALIZED ANXIETY DISORDER: ICD-10-CM

## 2021-04-14 RX ORDER — CLONAZEPAM 0.5 MG/1
0.5 TABLET ORAL NIGHTLY PRN
Qty: 30 TABLET | Refills: 2 | Status: SHIPPED | OUTPATIENT
Start: 2021-04-14 | End: 2021-07-13 | Stop reason: SDUPTHER

## 2021-05-13 RX ORDER — VENLAFAXINE HYDROCHLORIDE 75 MG/1
CAPSULE, EXTENDED RELEASE ORAL
Qty: 90 CAPSULE | Refills: 0 | Status: SHIPPED | OUTPATIENT
Start: 2021-05-13 | End: 2021-08-25

## 2021-05-24 RX ORDER — ESTRADIOL 0.04 MG/D
1 PATCH TRANSDERMAL WEEKLY
Qty: 4 PATCH | Refills: 5 | Status: SHIPPED | OUTPATIENT
Start: 2021-05-24 | End: 2021-10-25

## 2021-06-02 RX ORDER — ERGOCALCIFEROL 1.25 MG/1
CAPSULE ORAL
Qty: 12 CAPSULE | Refills: 3 | Status: SHIPPED | OUTPATIENT
Start: 2021-06-02 | End: 2022-05-18

## 2021-07-13 DIAGNOSIS — F41.1 GENERALIZED ANXIETY DISORDER: ICD-10-CM

## 2021-07-13 RX ORDER — CLONAZEPAM 0.5 MG/1
0.5 TABLET ORAL NIGHTLY PRN
Qty: 30 TABLET | Refills: 2 | Status: SHIPPED | OUTPATIENT
Start: 2021-07-13 | End: 2021-07-14

## 2021-07-13 NOTE — TELEPHONE ENCOUNTER
Caller: Jen Cook SANAM    Relationship: Self    Best call back number: [phone]    Medication needed:   Requested Prescriptions     Pending Prescriptions Disp Refills   • clonazePAM (KlonoPIN) 0.5 MG tablet 30 tablet 2     Sig: Take 1 tablet by mouth At Night As Needed for Anxiety.       When do you need the refill by: 7/13/21    What additional details did the patient provide when requesting the medication: PATIENT IS OUT OF MEDICATION    Does the patient have less than a 3 day supply:  [x] Yes  [] No    What is the patient's preferred pharmacy: [facility]ACY #[id number] - [place], [place] - [address]  - [phone]  - [phone] FX

## 2021-07-14 RX ORDER — CLONAZEPAM 0.5 MG/1
0.5 TABLET ORAL NIGHTLY PRN
Qty: 30 TABLET | Refills: 2 | OUTPATIENT
Start: 2021-07-14

## 2021-07-14 RX ORDER — CLONAZEPAM 0.5 MG/1
0.5 TABLET ORAL NIGHTLY PRN
Qty: 30 TABLET | Refills: 2 | Status: SHIPPED | OUTPATIENT
Start: 2021-07-14 | End: 2021-10-10 | Stop reason: SDUPTHER

## 2021-08-25 RX ORDER — VENLAFAXINE HYDROCHLORIDE 75 MG/1
CAPSULE, EXTENDED RELEASE ORAL
Qty: 90 CAPSULE | Refills: 0 | Status: SHIPPED | OUTPATIENT
Start: 2021-08-25 | End: 2021-10-25 | Stop reason: SDUPTHER

## 2021-08-25 NOTE — TELEPHONE ENCOUNTER
Rx Refill Note  Requested Prescriptions     Pending Prescriptions Disp Refills   • venlafaxine XR (EFFEXOR-XR) 75 MG 24 hr capsule [Pharmacy Med Name: VENLAFAXINE HCL ER 75 MG CAP] 90 capsule 0     Sig: TAKE 1 CAPSULE BY MOUTH EVERY MORNING      Last office visit with prescribing clinician: Visit date not found      Next office visit with prescribing clinician: Visit date not found            Shauna Mo LPN  08/25/21, 11:34 EDT

## 2021-10-03 DIAGNOSIS — F51.01 PRIMARY INSOMNIA: ICD-10-CM

## 2021-10-04 RX ORDER — TEMAZEPAM 15 MG/1
CAPSULE ORAL
Qty: 60 CAPSULE | Refills: 2 | Status: SHIPPED | OUTPATIENT
Start: 2021-10-04 | End: 2022-04-01 | Stop reason: SDUPTHER

## 2021-10-04 NOTE — TELEPHONE ENCOUNTER
ZAHEER SAYS SHE IS COMPLETELY OUT OF TEMAZEPAM.    [facility]/pharm[id number] - [place], [place] - [address] - [phone] PH - [phone] FX

## 2021-10-04 NOTE — TELEPHONE ENCOUNTER
Rx Refill Note  Requested Prescriptions     Pending Prescriptions Disp Refills   • temazepam (RESTORIL) 15 MG capsule 60 capsule 2     Sig: TAKE 1-2 CAPSULES BY ORAL ROUTE AT BEDTIME AS NEEDED     Last refill:  4/7/21 #60/2  Last office visit with prescribing clinician: 12/30/2020      Next office visit with prescribing clinician: none           Jeanine Mckay RN  10/04/21, 12:06 EDT

## 2021-10-10 DIAGNOSIS — F41.1 GENERALIZED ANXIETY DISORDER: ICD-10-CM

## 2021-10-11 RX ORDER — CLONAZEPAM 0.5 MG/1
0.5 TABLET ORAL NIGHTLY PRN
Qty: 30 TABLET | Refills: 2 | Status: SHIPPED | OUTPATIENT
Start: 2021-10-11 | End: 2022-01-04 | Stop reason: SDUPTHER

## 2021-10-11 NOTE — TELEPHONE ENCOUNTER
Caller: Jen Cook    Relationship: Self    Medication requested (name and dosage): clonazePAM (KlonoPIN) 0.5 MG tablet    Pharmacy where request should be sent: [facility]/pharmacy #[id number] - [place], [place] - [address]  - [phone]  - [phone] FX     Additional details provided by patient: PATIENT IS OUT OF MEDICATION    Best call back number: [phone]    Does the patient have less than a 3 day supply:  [x] Yes  [] No    Ysabel Swanson Rep   10/11/21 15:19 EDT

## 2021-10-25 RX ORDER — VENLAFAXINE HYDROCHLORIDE 75 MG/1
75 CAPSULE, EXTENDED RELEASE ORAL EVERY MORNING
Qty: 90 CAPSULE | Refills: 0 | Status: SHIPPED | OUTPATIENT
Start: 2021-10-25 | End: 2022-01-03

## 2021-10-25 RX ORDER — ESTRADIOL 0.04 MG/D
1 PATCH TRANSDERMAL WEEKLY
Qty: 4 PATCH | Refills: 5 | Status: SHIPPED | OUTPATIENT
Start: 2021-10-25 | End: 2022-04-11

## 2021-12-13 DIAGNOSIS — H91.13 PRESBYCUSIS OF BOTH EARS: Primary | ICD-10-CM

## 2022-01-03 ENCOUNTER — OFFICE VISIT (OUTPATIENT)
Dept: INTERNAL MEDICINE | Facility: CLINIC | Age: 72
End: 2022-01-03

## 2022-01-03 VITALS
TEMPERATURE: 97 F | HEIGHT: 66 IN | BODY MASS INDEX: 23.14 KG/M2 | HEART RATE: 70 BPM | WEIGHT: 144 LBS | OXYGEN SATURATION: 96 % | SYSTOLIC BLOOD PRESSURE: 122 MMHG | RESPIRATION RATE: 16 BRPM | DIASTOLIC BLOOD PRESSURE: 86 MMHG

## 2022-01-03 DIAGNOSIS — Z00.00 MEDICARE ANNUAL WELLNESS VISIT, SUBSEQUENT: Primary | ICD-10-CM

## 2022-01-03 DIAGNOSIS — E78.2 MIXED HYPERLIPIDEMIA: ICD-10-CM

## 2022-01-03 DIAGNOSIS — F41.1 GENERALIZED ANXIETY DISORDER: ICD-10-CM

## 2022-01-03 DIAGNOSIS — N95.9 MENOPAUSAL AND POSTMENOPAUSAL DISORDER: ICD-10-CM

## 2022-01-03 DIAGNOSIS — K21.9 GASTROESOPHAGEAL REFLUX DISEASE WITHOUT ESOPHAGITIS: Chronic | ICD-10-CM

## 2022-01-03 DIAGNOSIS — R53.83 OTHER FATIGUE: ICD-10-CM

## 2022-01-03 DIAGNOSIS — H93.12 TINNITUS, LEFT EAR: ICD-10-CM

## 2022-01-03 DIAGNOSIS — F32.4 MAJOR DEPRESSIVE DISORDER WITH SINGLE EPISODE, IN PARTIAL REMISSION: ICD-10-CM

## 2022-01-03 DIAGNOSIS — I95.1 ORTHOSTATIC HYPOTENSION: ICD-10-CM

## 2022-01-03 DIAGNOSIS — E55.9 VITAMIN D DEFICIENCY: ICD-10-CM

## 2022-01-03 DIAGNOSIS — I10 MILD ESSENTIAL HYPERTENSION: ICD-10-CM

## 2022-01-03 DIAGNOSIS — H91.13 PRESBYCUSIS OF BOTH EARS: Chronic | ICD-10-CM

## 2022-01-03 PROBLEM — H91.93 BILATERAL HEARING LOSS: Chronic | Status: ACTIVE | Noted: 2022-01-03

## 2022-01-03 PROCEDURE — 99213 OFFICE O/P EST LOW 20 MIN: CPT | Performed by: INTERNAL MEDICINE

## 2022-01-03 PROCEDURE — 90732 PPSV23 VACC 2 YRS+ SUBQ/IM: CPT | Performed by: INTERNAL MEDICINE

## 2022-01-03 PROCEDURE — 1170F FXNL STATUS ASSESSED: CPT | Performed by: INTERNAL MEDICINE

## 2022-01-03 PROCEDURE — G0439 PPPS, SUBSEQ VISIT: HCPCS | Performed by: INTERNAL MEDICINE

## 2022-01-03 PROCEDURE — G0009 ADMIN PNEUMOCOCCAL VACCINE: HCPCS | Performed by: INTERNAL MEDICINE

## 2022-01-03 PROCEDURE — 93000 ELECTROCARDIOGRAM COMPLETE: CPT | Performed by: INTERNAL MEDICINE

## 2022-01-03 PROCEDURE — 1159F MED LIST DOCD IN RCRD: CPT | Performed by: INTERNAL MEDICINE

## 2022-01-03 RX ORDER — VENLAFAXINE HYDROCHLORIDE 75 MG/1
75 CAPSULE, EXTENDED RELEASE ORAL EVERY MORNING
Qty: 90 CAPSULE | Refills: 1 | Status: SHIPPED | OUTPATIENT
Start: 2022-01-03 | End: 2022-03-22 | Stop reason: SDUPTHER

## 2022-01-03 RX ORDER — FAMOTIDINE 20 MG/1
20 TABLET, FILM COATED ORAL 2 TIMES DAILY
Qty: 180 TABLET | Refills: 1 | Status: SHIPPED | OUTPATIENT
Start: 2022-01-03 | End: 2023-01-17 | Stop reason: SDUPTHER

## 2022-01-03 NOTE — PATIENT INSTRUCTIONS
Patient Instructions   Problem List Items Addressed This Visit        Cardiac and Vasculature    Mixed hyperlipidemia    Overview     1/3/2022 Yi Edouard MD    Continue to improve low-fat and low sugar diet.  Increase exercise and physical activity.          Relevant Orders    Lipid Panel    TSH    Mild essential hypertension    Overview     1/3/2022 Yi Edouard MD    Blood pressures have been controlled by lifestyle.    Blood pressures are now running very low some days.  She feels tired on those days.  The hypotension may be related to poor fluid intake.    Patient was advised to be sure she is getting 8 glasses of fluids a day, and not counting any soda or carbonated beverages.           Orthostatic hypotension    Overview     1/3/2022 Yi Edouard MD    Blood pressures have been controlled by lifestyle.    Blood pressures are now running very low some days.  She feels tired on those days.  The hypotension may be related to poor fluid intake.    Patient was advised to be sure she is getting 8 glasses of fluids a day, and not counting any soda or carbonated beverages.           Relevant Orders    CBC & Differential    Comprehensive Metabolic Panel    Magnesium    Microalbumin / Creatinine Urine Ratio - Urine, Clean Catch    Urinalysis With Microscopic - Urine, Clean Catch       ENT    Bilateral hearing loss (Chronic)    Overview     1/3/2022 Yi Edouard MD     Hearing test         Tinnitus, left ear    Overview     1/3/2022 Yi Edouard MD    Do auto inflation (hold the nose and blow) multiple times a day.  May take Mucinex twice a day as needed to decrease pressure in the eustachian tube.            Endocrine and Metabolic    Vitamin D deficiency    Overview     1/3/2022 Yi Edouard MD    Continue current dose of vitamin D.         Relevant Orders    Vitamin D 25 Hydroxy       Gastrointestinal Abdominal     Gastroesophageal reflux disease without esophagitis (Chronic)     Overview     1/3/2022 Yi Edouard MD    Take 20 mg famotidine twice a day.      Continue to avoid eating close to bedtime and avoid large meals.  Continue to avoid acidic foods and spicy foods.         Relevant Medications    famotidine (PEPCID) 20 MG tablet       Genitourinary and Reproductive     Menopausal and postmenopausal disorder    Relevant Orders    DEXA Bone Density Axial       Mental Health    Generalized anxiety disorder    Overview     1/3/2022 Yi Edouard MD    Continue venlafaxine XR 75 mg daily and continue clonazepam in the evenings as needed.  Continue regular exercise and physical activity.          Relevant Medications    temazepam (RESTORIL) 15 MG capsule    clonazePAM (KlonoPIN) 0.5 MG tablet    venlafaxine XR (EFFEXOR-XR) 75 MG 24 hr capsule    Major depressive disorder with single episode, in partial remission (HCC)    Overview     1/3/2022 Yi Edouard MD    Continue venlafaxine XR 75 mg daily and continue clonazepam in the evenings as needed.  Continue regular exercise and physical activity.          Relevant Medications    temazepam (RESTORIL) 15 MG capsule    venlafaxine XR (EFFEXOR-XR) 75 MG 24 hr capsule       Symptoms and Signs    Other fatigue    Overview     1/3/2022 Yi Edouard MD    Fatigue and low blood pressure seem to coincide.  Probably related to poor fluid intake.    We will check labs.           Other Visit Diagnoses     Medicare annual wellness visit, subsequent    -  Primary          Mindfulness-Based Stress Reduction  Mindfulness-based stress reduction (MBSR) is a program that helps people learn to practice mindfulness. Mindfulness is the practice of intentionally paying attention to the present moment. It can be learned and practiced through techniques such as education, breathing exercises, meditation, and yoga. MBSR includes several mindfulness techniques in one program.  MBSR works best when you understand the treatment, are willing to try new  things, and can commit to spending time practicing what you learn. MBSR training may include learning about:  · How your emotions, thoughts, and reactions affect your body.  · New ways to respond to things that cause negative thoughts to start (triggers).  · How to notice your thoughts and let go of them.  · Practicing awareness of everyday things that you normally do without thinking.  · The techniques and goals of different types of meditation.  What are the benefits of MBSR?  MBSR can have many benefits, which include helping you to:  · Develop self-awareness. This refers to knowing and understanding yourself.  · Learn skills and attitudes that help you to participate in your own health care.  · Learn new ways to care for yourself.  · Be more accepting about how things are, and let things go.  · Be less judgmental and approach things with an open mind.  · Be patient with yourself and trust yourself more.  MBSR has also been shown to:  · Reduce negative emotions, such as depression and anxiety.  · Improve memory and focus.  · Change how you sense and approach pain.  · Boost your body's ability to fight infections.  · Help you connect better with other people.  · Improve your sense of well-being.  Follow these instructions at home:    · Find a local in-person or online MBSR program.  · Set aside some time regularly for mindfulness practice.  · Find a mindfulness practice that works best for you. This may include one or more of the following:  ? Meditation. Meditation involves focusing your mind on a certain thought or activity.  ? Breathing awareness exercises. These help you to stay present by focusing on your breath.  ? Body scan. For this practice, you lie down and pay attention to each part of your body from head to toe. You can identify tension and soreness and intentionally relax parts of your body.  ? Yoga. Yoga involves stretching and breathing, and it can improve your ability to move and be flexible. It  can also provide an experience of testing your body's limits, which can help you release stress.  ? Mindful eating. This way of eating involves focusing on the taste, texture, color, and smell of each bite of food. Because this slows down eating and helps you feel full sooner, it can be an important part of a weight-loss plan.  · Find a podcast or recording that provides guidance for breathing awareness, body scan, or meditation exercises. You can listen to these any time when you have a free moment to rest without distractions.  · Follow your treatment plan as told by your health care provider. This may include taking regular medicines and making changes to your diet or lifestyle as recommended.  How to practice mindfulness  To do a basic awareness exercise:  · Find a comfortable place to sit.  · Pay attention to the present moment. Observe your thoughts, feelings, and surroundings just as they are.  · Avoid placing judgment on yourself, your feelings, or your surroundings. Make note of any judgment that comes up, and let it go.  · Your mind may wander, and that is okay. Make note of when your thoughts drift, and return your attention to the present moment.  To do basic mindfulness meditation:  · Find a comfortable place to sit. This may include a stable chair or a firm floor cushion.  ? Sit upright with your back straight. Let your arms fall next to your side with your hands resting on your legs.  ? If sitting in a chair, rest your feet flat on the floor.  ? If sitting on a cushion, cross your legs in front of you.  · Keep your head in a neutral position with your chin dropped slightly. Relax your jaw and rest the tip of your tongue on the roof of your mouth. Drop your gaze to the floor. You can close your eyes if you like.  · Breathe normally and pay attention to your breath. Feel the air moving in and out of your nose. Feel your belly expanding and relaxing with each breath.  · Your mind may wander, and that is  okay. Make note of when your thoughts drift, and return your attention to your breath.  · Avoid placing judgment on yourself, your feelings, or your surroundings. Make note of any judgment or feelings that come up, let them go, and bring your attention back to your breath.  · When you are ready, lift your gaze or open your eyes. Pay attention to how your body feels after the meditation.  Where to find more information  You can find more information about MBSR from:  · Your health care provider.  · Community-based meditation centers or programs.  · Programs offered near you.  Summary  · Mindfulness-based stress reduction (MBSR) is a program that teaches you how to intentionally pay attention to the present moment. It is used with other treatments to help you cope better with daily stress, emotions, and pain.  · MBSR focuses on developing self-awareness, which allows you to respond to life stress without judgment or negative emotions.  · MBSR programs may involve learning different mindfulness practices, such as breathing exercises, meditation, yoga, body scan, or mindful eating. Find a mindfulness practice that works best for you, and set aside time for it on a regular basis.  This information is not intended to replace advice given to you by your health care provider. Make sure you discuss any questions you have with your health care provider.  Document Revised: 02/22/2021 Document Reviewed: 04/26/2018  [employer]vier Patient Education © 2021 [employer]

## 2022-01-03 NOTE — PROGRESS NOTES
The ABCs of the Annual Wellness Visit  Initial Medicare Wellness Visit    Chief Complaint   Patient presents with   • Medicare Wellness-subsequent   • Hypertension     f/u   • Hyperlipidemia   • Abdominal Pain     upper few years        Subjective   History of Present Illness:  Jen Cook is a 71 y.o. female who presents for an Initial Medicare Wellness Visit.    HPI  Midepigastric pain-sometimes before meals and sometimes after eating, but not during eating. Heartburn and acid reflux.  Worse if eats late. Avoid tomatoe sauces and spicy and greasy foods.  She has noticed that on October few years, but worse lately.  She has been taking 10 mg of famotidine over-the-counter and it does help a lot.  More anxious lately since has new job and learning new computer system and new type of business.  She does not feel depressed.  Just anxious with all the changes.    HPI  Fatigue. Her watch tells her BPs are low.  85/65 1 day when she felt very tired. Other days BPs are fine and she feels good.  She is unsure if she is keeping up enough fluid intake.  Salt intake is not high nor is it very very low..    CHRONIC CONDITIONS:    Taking venlafaxine daily.  No side effects.    Temazepam helps with sleep.  Clonazepam in the evenings helps with anxiety.  Taking venlafaxine daily and does not feel depressed.    HEALTH RISK ASSESSMENT    Recent Hospitalizations:  No hospitalization(s) within the last year.    Current Medical Providers:  Patient Care Team:  Yi Edouard MD as PCP - General (Internal Medicine)  Pierre Carreno MD as Consulting Physician (Gastroenterology)  Víctor Tanner MD (Inactive) as Consulting Physician (Gynecology)    Smoking Status:  Social History     Tobacco Use   Smoking Status Never Smoker   Smokeless Tobacco Never Used       Alcohol Consumption:  Social History     Substance and Sexual Activity   Alcohol Use Yes    Comment: socially        Depression Screen:   PHQ-2/PHQ-9 Depression Screening  1/3/2022   Little interest or pleasure in doing things 0   Feeling down, depressed, or hopeless 0   Total Score 0       Fall Risk Screen:  PAUL Fall Risk Assessment was completed, and patient is at LOW risk for falls.Assessment completed on:1/3/2022    Health Habits and Functional and Cognitive Screening:  Functional & Cognitive Status 1/3/2022   Do you have difficulty preparing food and eating? No   Do you have difficulty bathing yourself, getting dressed or grooming yourself? No   Do you have difficulty using the toilet? No   Do you have difficulty moving around from place to place? No   Do you have trouble with steps or getting out of a bed or a chair? No   Current Diet Well Balanced Diet   Dental Exam Up to date   Eye Exam Up to date   Exercise (times per week) 0 times per week   Current Exercises Include No Regular Exercise   Current Exercise Activities Include -   Do you need help using the phone?  No   Are you deaf or do you have serious difficulty hearing?  No   Do you need help with transportation? No   Do you need help shopping? No   Do you need help preparing meals?  No   Do you need help with housework?  No   Do you need help with laundry? No   Do you need help taking your medications? No   Do you need help managing money? No   Do you ever drive or ride in a car without wearing a seat belt? No   Have you felt unusual stress, anger or loneliness in the last month? No   Who do you live with? Alone   If you need help, do you have trouble finding someone available to you? No   Have you been bothered in the last four weeks by sexual problems? No   Do you have difficulty concentrating, remembering or making decisions? No       Does the patient have evidence of cognitive impairment? No    Asprin use counseling:Does not need ASA (and currently is not on it)    Age-appropriate Screening Schedule:  Refer to the list below for future screening recommendations based on patient's age, sex and/or medical conditions.  Orders for these recommended tests are listed in the plan section. The patient has been provided with a written plan.      Age appropriate preventive counseling done including age appropriate vaccines,regular  Mammogram and self breast exam, colonoscopy, regular dental visits, mental health, injury prevention such as wearing seat belt and preventing falls, healthy  nutrition, healthy weight, regular physical exercise. Alcohol use is moderate.  Tobacco history-none. Drug use-none.  STD's-not at risk.        Health Maintenance   Topic Date Due   • DXA SCAN  Never done   • ZOSTER VACCINE (1 of 2) Never done   • MAMMOGRAM  08/10/2019   • LIPID PANEL  06/23/2021   • INFLUENZA VACCINE  08/01/2021   • TDAP/TD VACCINES (2 - Td or Tdap) 02/13/2027        The following portions of the patient's history were reviewed and updated as appropriate: allergies, current medications, past family history, past medical history, past social history, past surgical history and problem list.    Outpatient Medications Prior to Visit   Medication Sig Dispense Refill   • clonazePAM (KlonoPIN) 0.5 MG tablet Take 1 tablet by mouth At Night As Needed for Anxiety. 30 tablet 2   • estradiol (CLIMARA) 0.0375 MG/24HR PLACE 1 PATCH ON THE SKIN AS DIRECTED BY PROVIDER 1 (ONE) TIME PER WEEK. 4 patch 5   • temazepam (RESTORIL) 15 MG capsule TAKE 1-2 CAPSULES BY ORAL ROUTE AT BEDTIME AS NEEDED 60 capsule 2   • vitamin D (ERGOCALCIFEROL) 1.25 MG (57955 UT) capsule capsule TAKE 1 CAPSULE BY MOUTH ONE TIME PER WEEK 12 capsule 3   • famotidine (PEPCID) 10 MG tablet Take 10 mg by mouth Daily.     • fluconazole (DIFLUCAN) 150 MG tablet May take 1 tablet at start of yeast infection symptoms. May take 2nd tablet in 72 hours if symptoms persist. 2 tablet 0   • venlafaxine XR (EFFEXOR-XR) 75 MG 24 hr capsule Take 1 capsule by mouth Every Morning. 90 capsule 0     No facility-administered medications prior to visit.       Patient Active Problem List   Diagnosis   •  Primary insomnia   • Generalized anxiety disorder   • Osteoarthritis   • Other fatigue   • Vitamin D deficiency   • Hip pain, bilateral   • Mixed hyperlipidemia   • ADD (attention deficit disorder) without hyperactivity   • Stress and adjustment reaction   • Major depressive disorder with single episode, in partial remission ([facility])   • Allergic rhinitis   • BMI 24.0-24.9, adult   • History of cerebrovascular disease   • Menopausal and postmenopausal disorder   • Nutritional anemia   • Benign paroxysmal positional vertigo due to bilateral vestibular disorder   • Mild essential hypertension   • Tinnitus, left ear   • Gastroesophageal reflux disease without esophagitis   • Midline cystocele   • Bilateral hearing loss   • Orthostatic hypotension       Advanced Care Planning:  ACP discussion was held with the patient during this visit. Patient has an advance directive (not in EMR), copy requested.    Review of Systems   Constitutional: Positive for fatigue. Negative for chills and fever.   HENT: Negative for congestion, ear pain, hearing loss and sinus pressure.    Eyes: Negative for visual disturbance.   Respiratory: Negative for cough, chest tightness, shortness of breath and wheezing.    Cardiovascular: Negative for chest pain, palpitations and leg swelling.   Gastrointestinal: Positive for abdominal pain. Negative for blood in stool, constipation and nausea.   Endocrine: Negative for cold intolerance and heat intolerance.   Genitourinary: Negative for dysuria and frequency.   Musculoskeletal: Negative for arthralgias, back pain and gait problem.   Skin: Negative for color change and rash.   Allergic/Immunologic: Negative for environmental allergies.   Neurological: Positive for light-headedness. Negative for dizziness and headaches.   Hematological: Negative for adenopathy. Does not bruise/bleed easily.   Psychiatric/Behavioral: Negative for dysphoric mood, sleep disturbance and suicidal ideas. The patient is  "nervous/anxious.        Compared to one year ago, the patient feels her physical health is the same.  Compared to one year ago, the patient feels her mental health is the same.    Reviewed chart for potential of high risk medication in the elderly: yes  Reviewed chart for potential of harmful drug interactions in the elderly:yes    Objective         Vitals:    01/03/22 1535   BP: 122/86   BP Location: Left arm   Patient Position: Sitting   Cuff Size: Adult   Pulse: 70   Resp: 16   Temp: 97 °F (36.1 °C)   TempSrc: Infrared   SpO2: 96%   Weight: 65.3 kg (144 lb)   Height: 166.4 cm (65.5\")   PainSc: 0-No pain     Patient's Body mass index is 23.6 kg/m². indicating that she is within normal range (BMI 18.5-24.9). No BMI management plan needed..    Body mass index is 23.6 kg/m².  Discussed the patient's BMI with her. The BMI is in the acceptable range.    Physical Exam  Vitals and nursing note reviewed.   Constitutional:       Appearance: She is well-developed and normal weight.   HENT:      Head: Normocephalic.   Eyes:      Conjunctiva/sclera: Conjunctivae normal.      Pupils: Pupils are equal, round, and reactive to light.   Neck:      Thyroid: No thyromegaly.   Cardiovascular:      Rate and Rhythm: Normal rate and regular rhythm.      Heart sounds: Normal heart sounds.   Pulmonary:      Effort: Pulmonary effort is normal.      Breath sounds: Normal breath sounds. No wheezing.   Chest:   Breasts:      Right: No inverted nipple, mass, nipple discharge, skin change, tenderness, axillary adenopathy or supraclavicular adenopathy.      Left: No inverted nipple, mass, nipple discharge, skin change, tenderness, axillary adenopathy or supraclavicular adenopathy.        Comments: Status post bilateral breast implants.  Abdominal:      General: Bowel sounds are normal.      Palpations: Abdomen is soft. There is no hepatomegaly or splenomegaly.      Tenderness: There is no abdominal tenderness.      Hernia: No hernia is present. "   Musculoskeletal:         General: No tenderness. Normal range of motion.      Cervical back: Normal range of motion and neck supple.   Lymphadenopathy:      Cervical: No cervical adenopathy.      Upper Body:      Right upper body: No supraclavicular, axillary or pectoral adenopathy.      Left upper body: No supraclavicular, axillary or pectoral adenopathy.   Skin:     General: Skin is warm and dry.      Findings: No rash.   Neurological:      Mental Status: She is alert and oriented to person, place, and time.      Cranial Nerves: No cranial nerve deficit.      Sensory: No sensory deficit.      Coordination: Coordination normal.      Gait: Gait normal.   Psychiatric:         Attention and Perception: Attention normal.         Mood and Affect: Mood normal.         Speech: Speech normal.         Behavior: Behavior normal.         Thought Content: Thought content normal.         Cognition and Memory: Cognition normal.         Judgment: Judgment normal.         ECG 12 Lead    Date/Time: 1/3/2022 5:23 PM  Performed by: Yi Edouard MD  Authorized by: Yi Edouard MD   Comparison: compared with previous ECG   Similar to previous ECG  Rhythm: sinus rhythm  Rate: normal  BPM: 65  Conduction: conduction normal  ST Segments: ST segments normal  T Waves: T waves normal  QRS axis: normal    Clinical impression: normal ECG                  Assessment/Plan   Medicare Risks and Personalized Health Plan  CMS Preventative Services Quick Reference  Cardiovascular risk  Osteoporosis Risk    The above risks/problems have been discussed with the patient.  Pertinent information has been shared with the patient in the After Visit Summary.  Follow up plans and orders are seen below in the Assessment/Plan Section.  Patient Instructions   Problem List Items Addressed This Visit        Cardiac and Vasculature    Mixed hyperlipidemia    Overview     1/3/2022 Yi Edouard MD    Continue to improve low-fat and low sugar diet.   Increase exercise and physical activity.          Relevant Orders    Lipid Panel    TSH    Mild essential hypertension    Overview     1/3/2022 Yi Edouard MD    Blood pressures have been controlled by lifestyle.    Blood pressures are now running very low some days.  She feels tired on those days.  The hypotension may be related to poor fluid intake.    Patient was advised to be sure she is getting 8 glasses of fluids a day, and not counting any soda or carbonated beverages.           Orthostatic hypotension    Overview     1/3/2022 Yi Edouard MD    Blood pressures have been controlled by lifestyle.    Blood pressures are now running very low some days.  She feels tired on those days.  The hypotension may be related to poor fluid intake.    Patient was advised to be sure she is getting 8 glasses of fluids a day, and not counting any soda or carbonated beverages.           Relevant Orders    CBC & Differential    Comprehensive Metabolic Panel    Magnesium    Microalbumin / Creatinine Urine Ratio - Urine, Clean Catch    Urinalysis With Microscopic - Urine, Clean Catch       ENT    Bilateral hearing loss (Chronic)    Overview     1/3/2022 Yi Edouard MD     Hearing test         Tinnitus, left ear    Overview     1/3/2022 Yi Edouard MD    Do auto inflation (hold the nose and blow) multiple times a day.  May take Mucinex twice a day as needed to decrease pressure in the eustachian tube.            Endocrine and Metabolic    Vitamin D deficiency    Overview     1/3/2022 Yi Edouard MD    Continue current dose of vitamin D.         Relevant Orders    Vitamin D 25 Hydroxy       Gastrointestinal Abdominal     Gastroesophageal reflux disease without esophagitis (Chronic)    Overview     1/3/2022 Yi Edouard MD    Take 20 mg famotidine twice a day.      Continue to avoid eating close to bedtime and avoid large meals.  Continue to avoid acidic foods and spicy foods.         Relevant  Medications    famotidine (PEPCID) 20 MG tablet       Genitourinary and Reproductive     Menopausal and postmenopausal disorder    Relevant Orders    DEXA Bone Density Axial       Mental Health    Generalized anxiety disorder    Overview     1/3/2022 Yi Edouard MD    Continue venlafaxine XR 75 mg daily and continue clonazepam in the evenings as needed.  Continue regular exercise and physical activity.          Relevant Medications    temazepam (RESTORIL) 15 MG capsule    clonazePAM (KlonoPIN) 0.5 MG tablet    venlafaxine XR (EFFEXOR-XR) 75 MG 24 hr capsule    Major depressive disorder with single episode, in partial remission (HCC)    Overview     1/3/2022 Yi Edouard MD    Continue venlafaxine XR 75 mg daily and continue clonazepam in the evenings as needed.  Continue regular exercise and physical activity.          Relevant Medications    temazepam (RESTORIL) 15 MG capsule    venlafaxine XR (EFFEXOR-XR) 75 MG 24 hr capsule       Symptoms and Signs    Other fatigue    Overview     1/3/2022 Yi Edouard MD    Fatigue and low blood pressure seem to coincide.  Probably related to poor fluid intake.    We will check labs.           Other Visit Diagnoses     Medicare annual wellness visit, subsequent    -  Primary           Follow Up:  Return in about 6 months (around 7/3/2022) for recheck fasting.     An After Visit Summary and PPPS were given to the patient.

## 2022-01-04 DIAGNOSIS — F41.1 GENERALIZED ANXIETY DISORDER: ICD-10-CM

## 2022-01-04 RX ORDER — CLONAZEPAM 0.5 MG/1
0.5 TABLET ORAL NIGHTLY PRN
Qty: 30 TABLET | Refills: 2 | Status: SHIPPED | OUTPATIENT
Start: 2022-01-04 | End: 2022-04-05 | Stop reason: SDUPTHER

## 2022-01-06 ENCOUNTER — TRANSCRIBE ORDERS (OUTPATIENT)
Dept: ADMINISTRATIVE | Facility: HOSPITAL | Age: 72
End: 2022-01-06

## 2022-01-06 DIAGNOSIS — Z12.31 VISIT FOR SCREENING MAMMOGRAM: Primary | ICD-10-CM

## 2022-01-18 RX ORDER — VENLAFAXINE HYDROCHLORIDE 75 MG/1
CAPSULE, EXTENDED RELEASE ORAL
Qty: 90 CAPSULE | Refills: 1 | OUTPATIENT
Start: 2022-01-18

## 2022-03-22 RX ORDER — VENLAFAXINE HYDROCHLORIDE 75 MG/1
75 CAPSULE, EXTENDED RELEASE ORAL EVERY MORNING
Qty: 90 CAPSULE | Refills: 1 | Status: SHIPPED | OUTPATIENT
Start: 2022-03-22 | End: 2022-05-24 | Stop reason: SDUPTHER

## 2022-03-22 NOTE — TELEPHONE ENCOUNTER
Rx Refill Note  Requested Prescriptions     Pending Prescriptions Disp Refills   • venlafaxine XR (EFFEXOR-XR) 75 MG 24 hr capsule 90 capsule 1     Sig: Take 1 capsule by mouth Every Morning.      Last office visit with prescribing clinician: 1/3/2022      Next office visit with prescribing clinician: 7/18/2022            Renee Hernandez, PCT  03/22/22, 14:52 EDT

## 2022-03-25 RX ORDER — FLUCONAZOLE 150 MG/1
150 TABLET ORAL DAILY PRN
Qty: 3 TABLET | Refills: 0 | OUTPATIENT
Start: 2022-03-25 | End: 2022-10-29

## 2022-04-01 DIAGNOSIS — F51.01 PRIMARY INSOMNIA: ICD-10-CM

## 2022-04-01 RX ORDER — TEMAZEPAM 15 MG/1
CAPSULE ORAL
Qty: 60 CAPSULE | Refills: 2 | Status: SHIPPED | OUTPATIENT
Start: 2022-04-01 | End: 2022-09-30

## 2022-04-01 NOTE — TELEPHONE ENCOUNTER
Rx Refill Note  Requested Prescriptions     Pending Prescriptions Disp Refills   • temazepam (RESTORIL) 15 MG capsule 60 capsule 2     Sig: TAKE 1-2 CAPSULES BY ORAL ROUTE AT BEDTIME AS NEEDED      Last office visit with prescribing clinician: 1/3/2022      Next office visit with prescribing clinician: 7/18/2022     LA: 10/04/21 #60 2R             Rosalind Rebolledo LPN  04/01/22, 09:08 EDT

## 2022-04-04 ENCOUNTER — APPOINTMENT (OUTPATIENT)
Dept: BONE DENSITY | Facility: HOSPITAL | Age: 72
End: 2022-04-04

## 2022-04-04 ENCOUNTER — HOSPITAL ENCOUNTER (OUTPATIENT)
Dept: MAMMOGRAPHY | Facility: HOSPITAL | Age: 72
End: 2022-04-04

## 2022-04-05 DIAGNOSIS — F41.1 GENERALIZED ANXIETY DISORDER: ICD-10-CM

## 2022-04-05 RX ORDER — CLONAZEPAM 0.5 MG/1
0.5 TABLET ORAL NIGHTLY PRN
Qty: 30 TABLET | Refills: 2 | Status: SHIPPED | OUTPATIENT
Start: 2022-04-05 | End: 2022-07-04 | Stop reason: SDUPTHER

## 2022-04-11 RX ORDER — ESTRADIOL 0.04 MG/D
1 PATCH TRANSDERMAL WEEKLY
Qty: 4 PATCH | Refills: 5 | Status: SHIPPED | OUTPATIENT
Start: 2022-04-11 | End: 2022-08-25

## 2022-05-05 RX ORDER — AZITHROMYCIN 250 MG/1
TABLET, FILM COATED ORAL
Qty: 6 TABLET | Refills: 0 | Status: SHIPPED | OUTPATIENT
Start: 2022-05-05 | End: 2022-05-18

## 2022-05-12 ENCOUNTER — HOSPITAL ENCOUNTER (OUTPATIENT)
Dept: BONE DENSITY | Facility: HOSPITAL | Age: 72
Discharge: HOME | End: 2022-05-12

## 2022-05-12 ENCOUNTER — HOSPITAL ENCOUNTER (OUTPATIENT)
Dept: MAMMOGRAPHY | Facility: HOSPITAL | Age: 72
Discharge: HOME | End: 2022-05-12

## 2022-05-12 DIAGNOSIS — Z12.31 VISIT FOR SCREENING MAMMOGRAM: ICD-10-CM

## 2022-05-12 DIAGNOSIS — N95.9 MENOPAUSAL AND POSTMENOPAUSAL DISORDER: ICD-10-CM

## 2022-05-12 PROCEDURE — 77067 SCR MAMMO BI INCL CAD: CPT | Performed by: RADIOLOGY

## 2022-05-12 PROCEDURE — 77063 BREAST TOMOSYNTHESIS BI: CPT

## 2022-05-12 PROCEDURE — 77080 DXA BONE DENSITY AXIAL: CPT

## 2022-05-12 PROCEDURE — 77063 BREAST TOMOSYNTHESIS BI: CPT | Performed by: RADIOLOGY

## 2022-05-12 PROCEDURE — 77067 SCR MAMMO BI INCL CAD: CPT

## 2022-05-16 ENCOUNTER — LAB (OUTPATIENT)
Dept: LAB | Facility: HOSPITAL | Age: 72
End: 2022-05-16

## 2022-05-16 DIAGNOSIS — E55.9 VITAMIN D DEFICIENCY: ICD-10-CM

## 2022-05-16 DIAGNOSIS — E78.2 MIXED HYPERLIPIDEMIA: ICD-10-CM

## 2022-05-16 DIAGNOSIS — I95.1 ORTHOSTATIC HYPOTENSION: ICD-10-CM

## 2022-05-16 LAB
ALBUMIN SERPL-MCNC: 4.7 G/DL (ref 3.5–5.2)
ALBUMIN/GLOB SERPL: 1.7 G/DL
ALP SERPL-CCNC: 72 U/L (ref 39–117)
ALT SERPL W P-5'-P-CCNC: 14 U/L (ref 1–33)
ANION GAP SERPL CALCULATED.3IONS-SCNC: 14.8 MMOL/L (ref 5–15)
AST SERPL-CCNC: 17 U/L (ref 1–32)
BACTERIA UR QL AUTO: ABNORMAL /HPF
BASOPHILS # BLD AUTO: 0.07 10*3/MM3 (ref 0–0.2)
BASOPHILS NFR BLD AUTO: 0.9 % (ref 0–1.5)
BILIRUB SERPL-MCNC: 0.3 MG/DL (ref 0–1.2)
BILIRUB UR QL STRIP: NEGATIVE
BUN SERPL-MCNC: 13 MG/DL (ref 8–23)
BUN/CREAT SERPL: 17.1 (ref 7–25)
CALCIUM SPEC-SCNC: 9.6 MG/DL (ref 8.6–10.5)
CHLORIDE SERPL-SCNC: 101 MMOL/L (ref 98–107)
CHOLEST SERPL-MCNC: 285 MG/DL (ref 0–200)
CLARITY UR: CLEAR
CO2 SERPL-SCNC: 24.2 MMOL/L (ref 22–29)
COLOR UR: YELLOW
CREAT SERPL-MCNC: 0.76 MG/DL (ref 0.57–1)
DEPRECATED RDW RBC AUTO: 43.8 FL (ref 37–54)
EGFRCR SERPLBLD CKD-EPI 2021: 83.4 ML/MIN/1.73
EOSINOPHIL # BLD AUTO: 0.28 10*3/MM3 (ref 0–0.4)
EOSINOPHIL NFR BLD AUTO: 3.4 % (ref 0.3–6.2)
ERYTHROCYTE [DISTWIDTH] IN BLOOD BY AUTOMATED COUNT: 13.5 % (ref 12.3–15.4)
GLOBULIN UR ELPH-MCNC: 2.8 GM/DL
GLUCOSE SERPL-MCNC: 81 MG/DL (ref 65–99)
GLUCOSE UR STRIP-MCNC: NEGATIVE MG/DL
HCT VFR BLD AUTO: 44 % (ref 34–46.6)
HDLC SERPL-MCNC: 62 MG/DL (ref 40–60)
HGB BLD-MCNC: 14.5 G/DL (ref 12–15.9)
HGB UR QL STRIP.AUTO: NEGATIVE
HYALINE CASTS UR QL AUTO: ABNORMAL /LPF
IMM GRANULOCYTES # BLD AUTO: 0.02 10*3/MM3 (ref 0–0.05)
IMM GRANULOCYTES NFR BLD AUTO: 0.2 % (ref 0–0.5)
KETONES UR QL STRIP: NEGATIVE
LDLC SERPL CALC-MCNC: 187 MG/DL (ref 0–100)
LDLC/HDLC SERPL: 2.97 {RATIO}
LEUKOCYTE ESTERASE UR QL STRIP.AUTO: ABNORMAL
LYMPHOCYTES # BLD AUTO: 2.26 10*3/MM3 (ref 0.7–3.1)
LYMPHOCYTES NFR BLD AUTO: 27.8 % (ref 19.6–45.3)
MAGNESIUM SERPL-MCNC: 2.3 MG/DL (ref 1.6–2.4)
MCH RBC QN AUTO: 29.3 PG (ref 26.6–33)
MCHC RBC AUTO-ENTMCNC: 33 G/DL (ref 31.5–35.7)
MCV RBC AUTO: 88.9 FL (ref 79–97)
MONOCYTES # BLD AUTO: 0.69 10*3/MM3 (ref 0.1–0.9)
MONOCYTES NFR BLD AUTO: 8.5 % (ref 5–12)
NEUTROPHILS NFR BLD AUTO: 4.82 10*3/MM3 (ref 1.7–7)
NEUTROPHILS NFR BLD AUTO: 59.2 % (ref 42.7–76)
NITRITE UR QL STRIP: NEGATIVE
NRBC BLD AUTO-RTO: 0.1 /100 WBC (ref 0–0.2)
PH UR STRIP.AUTO: 6 [PH] (ref 5–8)
PLATELET # BLD AUTO: 359 10*3/MM3 (ref 140–450)
PMV BLD AUTO: 10 FL (ref 6–12)
POTASSIUM SERPL-SCNC: 4.3 MMOL/L (ref 3.5–5.2)
PROT SERPL-MCNC: 7.5 G/DL (ref 6–8.5)
PROT UR QL STRIP: NEGATIVE
RBC # BLD AUTO: 4.95 10*6/MM3 (ref 3.77–5.28)
RBC # UR STRIP: ABNORMAL /HPF
REF LAB TEST METHOD: ABNORMAL
SODIUM SERPL-SCNC: 140 MMOL/L (ref 136–145)
SP GR UR STRIP: 1.02 (ref 1–1.03)
SQUAMOUS #/AREA URNS HPF: ABNORMAL /HPF
TRIGL SERPL-MCNC: 193 MG/DL (ref 0–150)
TSH SERPL DL<=0.05 MIU/L-ACNC: 2.53 UIU/ML (ref 0.27–4.2)
UROBILINOGEN UR QL STRIP: ABNORMAL
VLDLC SERPL-MCNC: 36 MG/DL (ref 5–40)
WBC # UR STRIP: ABNORMAL /HPF
WBC NRBC COR # BLD: 8.14 10*3/MM3 (ref 3.4–10.8)

## 2022-05-16 PROCEDURE — 80053 COMPREHEN METABOLIC PANEL: CPT

## 2022-05-16 PROCEDURE — 82570 ASSAY OF URINE CREATININE: CPT

## 2022-05-16 PROCEDURE — 83735 ASSAY OF MAGNESIUM: CPT

## 2022-05-16 PROCEDURE — 85025 COMPLETE CBC W/AUTO DIFF WBC: CPT

## 2022-05-16 PROCEDURE — 80061 LIPID PANEL: CPT

## 2022-05-16 PROCEDURE — 81001 URINALYSIS AUTO W/SCOPE: CPT

## 2022-05-16 PROCEDURE — 82306 VITAMIN D 25 HYDROXY: CPT

## 2022-05-16 PROCEDURE — 84443 ASSAY THYROID STIM HORMONE: CPT

## 2022-05-16 PROCEDURE — 82043 UR ALBUMIN QUANTITATIVE: CPT

## 2022-05-17 LAB
25(OH)D3 SERPL-MCNC: 32.8 NG/ML (ref 30–100)
ALBUMIN UR-MCNC: <1.2 MG/DL
CREAT UR-MCNC: 82.3 MG/DL
MICROALBUMIN/CREAT UR: NORMAL MG/G{CREAT}

## 2022-05-18 ENCOUNTER — TELEPHONE (OUTPATIENT)
Dept: INTERNAL MEDICINE | Facility: CLINIC | Age: 72
End: 2022-05-18

## 2022-05-18 RX ORDER — ROSUVASTATIN CALCIUM 5 MG/1
2.5 TABLET, COATED ORAL NIGHTLY
Qty: 46 TABLET | Refills: 1 | Status: SHIPPED | OUTPATIENT
Start: 2022-05-18

## 2022-05-18 NOTE — TELEPHONE ENCOUNTER
Patient called concerned about the results of her screening mammogram that she recently had done that showed up on her MyChart.  She stated that she needed additional testing done - diagnostic mammogram with ultrasound.    She says that no one called her about this and would like to know what this means.  Call her today at [phone]

## 2022-05-22 PROBLEM — M85.89 OSTEOPENIA OF MULTIPLE SITES: Chronic | Status: ACTIVE | Noted: 2022-05-22

## 2022-05-24 RX ORDER — VENLAFAXINE HYDROCHLORIDE 75 MG/1
75 CAPSULE, EXTENDED RELEASE ORAL EVERY MORNING
Qty: 90 CAPSULE | Refills: 1 | Status: SHIPPED | OUTPATIENT
Start: 2022-05-24 | End: 2022-11-07 | Stop reason: SDUPTHER

## 2022-05-24 NOTE — TELEPHONE ENCOUNTER
Rx Refill Note  Requested Prescriptions     Pending Prescriptions Disp Refills   • venlafaxine XR (EFFEXOR-XR) 75 MG 24 hr capsule 90 capsule 1     Sig: Take 1 capsule by mouth Every Morning.      Last office visit with prescribing clinician: 1/3/2022      Next office visit with prescribing clinician: 7/18/2022            Jeanine Mckay RN  05/24/22, 11:20 EDT

## 2022-06-24 ENCOUNTER — OFFICE VISIT (OUTPATIENT)
Dept: INTERNAL MEDICINE | Facility: CLINIC | Age: 72
End: 2022-06-24

## 2022-06-24 ENCOUNTER — HOSPITAL ENCOUNTER (OUTPATIENT)
Dept: GENERAL RADIOLOGY | Facility: HOSPITAL | Age: 72
Discharge: HOME | End: 2022-06-24
Admitting: INTERNAL MEDICINE

## 2022-06-24 VITALS
HEART RATE: 68 BPM | TEMPERATURE: 96 F | BODY MASS INDEX: 23.14 KG/M2 | HEIGHT: 66 IN | DIASTOLIC BLOOD PRESSURE: 78 MMHG | SYSTOLIC BLOOD PRESSURE: 136 MMHG | WEIGHT: 144 LBS

## 2022-06-24 DIAGNOSIS — M25.561 CHRONIC PAIN OF RIGHT KNEE: Chronic | ICD-10-CM

## 2022-06-24 DIAGNOSIS — G89.29 CHRONIC PAIN OF RIGHT KNEE: Chronic | ICD-10-CM

## 2022-06-24 DIAGNOSIS — M54.50 CHRONIC BILATERAL LOW BACK PAIN WITHOUT SCIATICA: Primary | Chronic | ICD-10-CM

## 2022-06-24 DIAGNOSIS — K21.9 GASTROESOPHAGEAL REFLUX DISEASE WITHOUT ESOPHAGITIS: Chronic | ICD-10-CM

## 2022-06-24 DIAGNOSIS — G89.29 CHRONIC BILATERAL LOW BACK PAIN WITHOUT SCIATICA: Primary | Chronic | ICD-10-CM

## 2022-06-24 DIAGNOSIS — N64.4 PAIN OF RIGHT BREAST: Chronic | ICD-10-CM

## 2022-06-24 DIAGNOSIS — G89.29 CHRONIC BILATERAL LOW BACK PAIN WITHOUT SCIATICA: Chronic | ICD-10-CM

## 2022-06-24 DIAGNOSIS — M54.50 CHRONIC BILATERAL LOW BACK PAIN WITHOUT SCIATICA: Chronic | ICD-10-CM

## 2022-06-24 PROCEDURE — 99214 OFFICE O/P EST MOD 30 MIN: CPT | Performed by: INTERNAL MEDICINE

## 2022-06-24 PROCEDURE — 72110 X-RAY EXAM L-2 SPINE 4/>VWS: CPT

## 2022-06-24 RX ORDER — DIAPER,BRIEF,ADULT, DISPOSABLE
2 EACH MISCELLANEOUS NIGHTLY
COMMUNITY
Start: 2022-05-18 | End: 2022-10-08

## 2022-06-24 NOTE — PROGRESS NOTES
Jen MARION May  1950  [id number]  Patient Care Team:  Yi Edouard MD as PCP - General (Internal Medicine)  Pierre Carreno MD as Consulting Physician (Gastroenterology)  Víctor Tanner MD (Inactive) as Consulting Physician (Gynecology)    Jen Cook is a pleasant 71 y.o. female who presents for evaluation of Back Pain and Breast Pain (rt)    Chief Complaint   Patient presents with   • Back Pain   • Breast Pain     rt       HPI:   ***  Past Medical History:   Diagnosis Date   • Anxiety    • Benign essential hypertension with target blood pressure below 140/90    • Depression    • Generalized anxiety disorder with panic attacks    • Generalized anxiety disorder with panic attacks 1/7/2019   • Major depressive disorder with single episode    • Mixed hyperlipidemia    • Post-menopause on HRT (hormone replacement therapy)    • Primary insomnia    • Rectal pain 4/2/2019   • Stroke (HCC) 2008    R hemispheric Stroke   • Vertigo    • Vitamin D deficiency      Past Surgical History:   Procedure Laterality Date   • AUGMENTATION MAMMAPLASTY  1990   • BREAST AUGMENTATION Bilateral 1989   • LAPAROSCOPIC ASSISTED VAGINAL HYSTERECTOMY SALPINGO OOPHORECTOMY Bilateral    • LAPAROSCOPIC HERNANDEZ PROCEDURE     • PARTIAL KNEE ARTHROPLASTY  2015    Partial knee replacement 2 years ago     Family History   Adopted: Yes   Family history unknown: Yes     Social History     Tobacco Use   Smoking Status Never Smoker   Smokeless Tobacco Never Used     No Known Allergies    Current Outpatient Medications:   •  Cholecalciferol (vitamin D3) 125 MCG (5000 UT) tablet tablet, Take 1 tablet by mouth Daily., Disp: 90 tablet, Rfl: 3  •  clonazePAM (KlonoPIN) 0.5 MG tablet, Take 1 tablet by mouth At Night As Needed for Anxiety., Disp: 30 tablet, Rfl: 2  •  Coenzyme Q10 400 MG capsule, Take 1 capsule by mouth Every Night., Disp: 90 capsule, Rfl: 3  •  estradiol (CLIMARA) 0.0375 MG/24HR, PLACE 1 PATCH ON THE SKIN AS DIRECTED BY PROVIDER 1  (ONE) TIME PER WEEK., Disp: 4 patch, Rfl: 5  •  famotidine (PEPCID) 20 MG tablet, Take 1 tablet by mouth 2 (Two) Times a Day., Disp: 180 tablet, Rfl: 1  •  fluconazole (DIFLUCAN) 150 MG tablet, Take 1 tablet by mouth Daily As Needed (yeast vaginitis)., Disp: 3 tablet, Rfl: 0  •  rosuvastatin (CRESTOR) 5 MG tablet, Take 0.5 tablets by mouth Every Night., Disp: 46 tablet, Rfl: 1  •  temazepam (RESTORIL) 15 MG capsule, TAKE 1-2 CAPSULES BY ORAL ROUTE AT BEDTIME AS NEEDED, Disp: 60 capsule, Rfl: 2  •  venlafaxine XR (EFFEXOR-XR) 75 MG 24 hr capsule, Take 1 capsule by mouth Every Morning., Disp: 90 capsule, Rfl: 1    Review of Systems  Review of systems was completed, and pertinent findings are noted in the HPI.  There were no vitals taken for this visit.    Physical Exam    Procedures    PHQ-9 Total Score:      Assessment/Plan:  There are no diagnoses linked to this encounter.   There are no Patient Instructions on file for this visit.  Plan of care reviewed with patient at the conclusion of today's visit. Education was provided regarding diagnosis, management and any prescribed or recommended OTC medications.  Patient verbalizes understanding of and agreement with management plan.    No follow-ups on file.    Dictated Utilizing Dragon Dictation.    Rosalind Rebolledo LPN

## 2022-06-24 NOTE — ASSESSMENT & PLAN NOTE
Exam today is normal. May use moist heat on it. May take Advil as needed. Decrease caffeine intake.

## 2022-06-24 NOTE — PROGRESS NOTES
[facility]     Jen SANAM May  1950   [id number]      Patient Care Team:  Yi Edouard MD as PCP - General (Internal Medicine)  Pierre Carreno MD as Consulting Physician (Gastroenterology)  Víctor Tanner MD (Inactive) as Consulting Physician (Gynecology)  Leonel Ren MD as Consulting Physician (Orthopedic Surgery)    Chief Complaint   Patient presents with   • Back Pain   • Breast Pain     rt   • Hyperlipidemia   • Hypotension            HPI  Patient is a 71 y.o. female presents with back pain.    Back pain  The patient reports that she possibly has bilateral hip bursitis. Her hip is somewhat tender still. She was seen by Dr. Ren a long time ago. The pain now is in her back. Her back pain started around 05/2022. She joined the [employer] to exercise and has started using the treadmill. The pain came on suddenly. When she started doing the exercises, she was somewhat afraid. She denies any lateral hip pain. The back pain does not radiate down the legs. She does take Advil, which seems to help, and it does not cause any GI discomfort.     Knee edema  She reports having right knee edema. She notes that she does have a knot in her knee. She has a scheduled appointment with Dr. Ren on 08/01/2022. She has been utilizing a cold pack on her knee. Taking Advil does help her knee pain. She denies any injury to the knee. She denies any symptoms in the left knee since she has had a partial replacement.    GERD symptoms  She is taking famotidine twice a day for GERD symptoms.    Right breast pain  She reports having right breast pain. She describes it as shooting pain. She has been noticing the pain for approximately a month. When she rubs up against it, it is a little tender. She denies having any nodules or lumps.  No skin changes.  She has a scheduled ultrasound on 07/12/2022. She had her mammogram on 05/12/2022 and they want to do extra views on the left breast.  She  "states that is the one that she always gets call back..      CHRONIC CONDITIONS      Past Medical History:   Diagnosis Date   • Anxiety    • Benign essential hypertension with target blood pressure below 140/90    • Depression    • Generalized anxiety disorder with panic attacks    • Generalized anxiety disorder with panic attacks 1/7/2019   • Major depressive disorder with single episode    • Mixed hyperlipidemia    • Post-menopause on HRT (hormone replacement therapy)    • Primary insomnia    • Rectal pain 4/2/2019   • Stroke (HCC) 2008    R hemispheric Stroke   • Vertigo    • Vitamin D deficiency        Past Surgical History:   Procedure Laterality Date   • AUGMENTATION MAMMAPLASTY  1990   • BREAST AUGMENTATION Bilateral 1989   • LAPAROSCOPIC ASSISTED VAGINAL HYSTERECTOMY SALPINGO OOPHORECTOMY Bilateral    • LAPAROSCOPIC HERNANDEZ PROCEDURE     • PARTIAL KNEE ARTHROPLASTY  2015    Partial knee replacement 2 years ago       Family History   Adopted: Yes   Family history unknown: Yes       Social History     Socioeconomic History   • Marital status:    Tobacco Use   • Smoking status: Never Smoker   • Smokeless tobacco: Never Used   Vaping Use   • Vaping Use: Never used   Substance and Sexual Activity   • Alcohol use: Yes     Comment: socially    • Drug use: No   • Sexual activity: Defer       No Known Allergies    Review of Systems:     Review of Systems    Vital Signs  Vitals:    06/24/22 1258   BP: 136/78   BP Location: Left arm   Patient Position: Sitting   Cuff Size: Adult   Pulse: 68   Temp: 96 °F (35.6 °C)   TempSrc: Temporal   Weight: 65.3 kg (144 lb)   Height: 166.4 cm (65.51\")   PainSc:   6   PainLoc: Back     Body mass index is 23.59 kg/m².  BMI is within normal parameters. No other follow-up for BMI required.        Current Outpatient Medications:   •  Cholecalciferol (vitamin D3) 125 MCG (5000 UT) tablet tablet, Take 1 tablet by mouth Daily., Disp: 90 tablet, Rfl: 3  •  clonazePAM (KlonoPIN) 0.5 MG " tablet, Take 1 tablet by mouth At Night As Needed for Anxiety., Disp: 30 tablet, Rfl: 2  •  [employer] CoQ-10 200 MG capsule, Take 2 capsules by mouth Every Night., Disp: , Rfl:   •  estradiol (CLIMARA) 0.0375 MG/24HR, PLACE 1 PATCH ON THE SKIN AS DIRECTED BY PROVIDER 1 (ONE) TIME PER WEEK., Disp: 4 patch, Rfl: 5  •  famotidine (PEPCID) 20 MG tablet, Take 1 tablet by mouth 2 (Two) Times a Day., Disp: 180 tablet, Rfl: 1  •  fluconazole (DIFLUCAN) 150 MG tablet, Take 1 tablet by mouth Daily As Needed (yeast vaginitis)., Disp: 3 tablet, Rfl: 0  •  rosuvastatin (CRESTOR) 5 MG tablet, Take 0.5 tablets by mouth Every Night. (Patient taking differently: Take 2.5 mg by mouth 3 (Three) Times a Week.), Disp: 46 tablet, Rfl: 1  •  temazepam (RESTORIL) 15 MG capsule, TAKE 1-2 CAPSULES BY ORAL ROUTE AT BEDTIME AS NEEDED, Disp: 60 capsule, Rfl: 2  •  venlafaxine XR (EFFEXOR-XR) 75 MG 24 hr capsule, Take 1 capsule by mouth Every Morning., Disp: 90 capsule, Rfl: 1    Physical Exam:    Physical Exam  Vitals and nursing note reviewed.   Constitutional:       Appearance: She is well-developed.   HENT:      Head: Normocephalic.   Eyes:      Conjunctiva/sclera: Conjunctivae normal.      Pupils: Pupils are equal, round, and reactive to light.   Neck:      Thyroid: No thyromegaly.   Cardiovascular:      Rate and Rhythm: Normal rate and regular rhythm.      Heart sounds: Normal heart sounds.   Pulmonary:      Effort: Pulmonary effort is normal.      Breath sounds: Normal breath sounds. No wheezing.   Chest:   Breasts:      Right: Normal.      Left: Normal.        Comments:  No axillary lymphadenopathy. Breast implants bilaterally are intact and soft.  Musculoskeletal:         General: Normal range of motion.      Cervical back: Normal range of motion and neck supple.      Lumbar back: Tenderness present. No bony tenderness.      Right knee: Swelling present. No erythema. No tenderness.      Comments: Low back muscles are tight. No spine  tenderness.   Lymphadenopathy:      Cervical: No cervical adenopathy.   Skin:     General: Skin is warm and dry.   Neurological:      Mental Status: She is alert and oriented to person, place, and time.      Motor: Motor function is intact.      Gait: Gait is intact.   Psychiatric:         Thought Content: Thought content normal.          ACE III MINI        Results Review:    None    CMP:  Lab Results   Component Value Date    BUN 13 05/16/2022    CREATININE 0.76 05/16/2022    EGFRIFNONA 80 12/30/2020    BCR 17.1 05/16/2022     05/16/2022    K 4.3 05/16/2022    CO2 24.2 05/16/2022    CALCIUM 9.6 05/16/2022    ALBUMIN 4.70 05/16/2022    BILITOT 0.3 05/16/2022    ALKPHOS 72 05/16/2022    AST 17 05/16/2022    ALT 14 05/16/2022     HbA1c:  No results found for: HGBA1C  Microalbumin:  Lab Results   Component Value Date    MICROALBUR <1.2 05/16/2022     Lipid Panel  Lab Results   Component Value Date    CHOL 285 (H) 05/16/2022    TRIG 193 (H) 05/16/2022    HDL 62 (H) 05/16/2022     (H) 05/16/2022    AST 17 05/16/2022    ALT 14 05/16/2022       Medication Review: Medications reviewed and noted  Patient Instructions   Problem List Items Addressed This Visit        Gastrointestinal Abdominal     Gastroesophageal reflux disease without esophagitis (Chronic)    Overview     Taking 20 mg famotidine twice a day.                 Current Assessment & Plan     Continue famotidine twice a day. Be careful to take ibuprofen with food.           Relevant Medications    famotidine (PEPCID) 20 MG tablet       Genitourinary and Reproductive     Pain of right breast (Chronic)    Current Assessment & Plan     Exam today is normal. May use moist heat on it. May take Advil as needed. Decrease caffeine intake.              Musculoskeletal and Injuries    Chronic bilateral low back pain without sciatica - Primary (Chronic)    Current Assessment & Plan     Referral to [facility] at [facility]. Use moist heat to relax tight muscles.  Do some walking everyday and be active. X-ray lumbar spine today.           Relevant Orders    XR Spine Lumbar 4+ View    Ambulatory Referral to Physical Therapy Evaluate and treat    Chronic pain of right knee (Chronic)    Current Assessment & Plan     Use a moist or use cold pack on the knee at least once a day and after exercise to decrease pain, swelling, and inflammation. Continue ibuprofen with food as needed. She will see Dr. Ren in 08/2022 as planned.           Relevant Orders    Ambulatory Referral to Physical Therapy Evaluate and treat             Diagnosis Plan   1. Chronic bilateral low back pain without sciatica  XR Spine Lumbar 4+ View    Ambulatory Referral to Physical Therapy Evaluate and treat   2. Gastroesophageal reflux disease without esophagitis     3. Pain of right breast     4. Chronic pain of right knee  Ambulatory Referral to Physical Therapy Evaluate and treat           Plan of care reviewed with patient at the conclusion of today's visit. Education was provided regarding diagnosis, management, and any prescribed or recommended OTC medications.Patient verbalizes understanding of and agreement with management plan.         Transcribed from ambient dictation for Yi Edouard MD by Jackelyn Pearson.  06/24/22   14:11 EDT    Patient verbalized consent to the visit recording.  I have personally performed the services described in this document as transcribed by the above individual, and it is both accurate and complete.  Yi Edouard MD  6/24/2022  14:27 EDT

## 2022-06-24 NOTE — PATIENT INSTRUCTIONS
Patient Instructions   Problem List Items Addressed This Visit          Gastrointestinal Abdominal     Gastroesophageal reflux disease without esophagitis (Chronic)    Overview     Taking 20 mg famotidine twice a day.                 Current Assessment & Plan     Continue famotidine twice a day. Be careful to take ibuprofen with food.           Relevant Medications    famotidine (PEPCID) 20 MG tablet       Genitourinary and Reproductive     Pain of right breast (Chronic)    Current Assessment & Plan     Exam today is normal. May use moist heat on it. May take Advil as needed. Decrease caffeine intake.              Musculoskeletal and Injuries    Chronic bilateral low back pain without sciatica - Primary (Chronic)    Current Assessment & Plan     Referral to [facility]. Use moist heat to relax tight muscles. Do some walking everyday and be active. X-ray lumbar spine today.           Relevant Orders    XR Spine Lumbar 4+ View    Ambulatory Referral to Physical Therapy Evaluate and treat    Chronic pain of right knee (Chronic)    Current Assessment & Plan     Use a moist or use cold pack on the knee at least once a day and after exercise to decrease pain, swelling, and inflammation. Continue ibuprofen with food as needed. She will see Dr. Ren in 08/2022 as planned.           Relevant Orders    Ambulatory Referral to Physical Therapy Evaluate and treat          BMI for Adults  What is BMI?  Body mass index (BMI) is a number that is calculated from a person's weight and height. BMI can help estimate how much of a person's weight is composed of fat. BMI does not measure body fat directly. Rather, it is an alternative to procedures that directly measure body fat, which can be difficult and expensive.  BMI can help identify people who may be at higher risk for certain medical problems.  What are BMI measurements used for?  BMI is used as a screening tool to identify possible weight problems. It helps  "determine whether a person is obese, overweight, a healthy weight, or underweight.  BMI is useful for:  Identifying a weight problem that may be related to a medical condition or may increase the risk for medical problems.  Promoting changes, such as changes in diet and exercise, to help reach a healthy weight. BMI screening can be repeated to see if these changes are working.  How is BMI calculated?  BMI involves measuring your weight in relation to your height. Both height and weight are measured, and the BMI is calculated from those numbers. This can be done either in English (U.S.) or metric measurements. Note that charts and online BMI calculators are available to help you find your BMI quickly and easily without having to do these calculations yourself.  To calculate your BMI in English (U.S.) measurements:    Measure your weight in pounds (lb).  Multiply the number of pounds by 703.  For example, for a person who weighs 180 lb, multiply that number by 703, which equals 126,540.  Measure your height in inches. Then multiply that number by itself to get a measurement called \"inches squared.\"  For example, for a person who is 70 inches tall, the \"inches squared\" measurement is 70 inches x 70 inches, which equals 4,900 inches squared.  Divide the total from step 2 (number of lb x 703) by the total from step 3 (inches squared): 126,540 ÷ 4,900 = 25.8. This is your BMI.    To calculate your BMI in metric measurements:  Measure your weight in kilograms (kg).  Measure your height in meters (m). Then multiply that number by itself to get a measurement called \"meters squared.\"  For example, for a person who is 1.75 m tall, the \"meters squared\" measurement is 1.75 m x 1.75 m, which is equal to 3.1 meters squared.  Divide the number of kilograms (your weight) by the meters squared number. In this example: 70 ÷ 3.1 = 22.6. This is your BMI.  What do the results mean?  BMI charts are used to identify whether you are " underweight, normal weight, overweight, or obese. The following guidelines will be used:  Underweight: BMI less than 18.5.  Normal weight: BMI between 18.5 and 24.9.  Overweight: BMI between 25 and 29.9.  Obese: BMI of 30 or above.  Keep these notes in mind:  Weight includes both fat and muscle, so someone with a muscular build, such as an athlete, may have a BMI that is higher than 24.9. In cases like these, BMI is not an accurate measure of body fat.  To determine if excess body fat is the cause of a BMI of 25 or higher, further assessments may need to be done by a health care provider.  BMI is usually interpreted in the same way for men and women.  Where to find more information  For more information about BMI, including tools to quickly calculate your BMI, go to these websites:  Centers for Disease Control and Prevention: www.cdc.gov  American Heart Association: www.heart.org  National Heart, Lung, and Blood [place]: www.nhlbi.nih.gov  Summary  Body mass index (BMI) is a number that is calculated from a person's weight and height.  BMI may help estimate how much of a person's weight is composed of fat. BMI can help identify those who may be at higher risk for certain medical problems.  BMI can be measured using English measurements or metric measurements.  BMI charts are used to identify whether you are underweight, normal weight, overweight, or obese.  This information is not intended to replace advice given to you by your health care provider. Make sure you discuss any questions you have with your health care provider.  Document Revised: 09/09/2020 Document Reviewed: 07/17/2020  [employer] Patient Education © 2021 [employer]

## 2022-06-24 NOTE — ASSESSMENT & PLAN NOTE
Referral to [facility] at [facility]. Use moist heat to relax tight muscles. Do some walking everyday and be active. X-ray lumbar spine today.

## 2022-06-24 NOTE — ASSESSMENT & PLAN NOTE
Use a moist or use cold pack on the knee at least once a day and after exercise to decrease pain, swelling, and inflammation. Continue ibuprofen with food as needed. She will see Dr. Ren in 08/2022 as planned.

## 2022-07-01 ENCOUNTER — HOSPITAL ENCOUNTER (OUTPATIENT)
Dept: MAMMOGRAPHY | Facility: HOSPITAL | Age: 72
Discharge: HOME | End: 2022-07-01

## 2022-07-01 ENCOUNTER — HOSPITAL ENCOUNTER (OUTPATIENT)
Dept: ULTRASOUND IMAGING | Facility: HOSPITAL | Age: 72
Discharge: HOME | End: 2022-07-01

## 2022-07-01 DIAGNOSIS — R92.8 ABNORMAL MAMMOGRAM: ICD-10-CM

## 2022-07-01 PROCEDURE — 76642 ULTRASOUND BREAST LIMITED: CPT | Performed by: RADIOLOGY

## 2022-07-01 PROCEDURE — 77065 DX MAMMO INCL CAD UNI: CPT

## 2022-07-01 PROCEDURE — G0279 TOMOSYNTHESIS, MAMMO: HCPCS | Performed by: RADIOLOGY

## 2022-07-01 PROCEDURE — 77065 DX MAMMO INCL CAD UNI: CPT | Performed by: RADIOLOGY

## 2022-07-01 PROCEDURE — G0279 TOMOSYNTHESIS, MAMMO: HCPCS

## 2022-07-01 PROCEDURE — 76642 ULTRASOUND BREAST LIMITED: CPT

## 2022-07-04 DIAGNOSIS — F41.1 GENERALIZED ANXIETY DISORDER: ICD-10-CM

## 2022-07-05 RX ORDER — CLONAZEPAM 0.5 MG/1
0.5 TABLET ORAL NIGHTLY PRN
Qty: 30 TABLET | Refills: 2 | Status: SHIPPED | OUTPATIENT
Start: 2022-07-05 | End: 2022-09-12 | Stop reason: SDUPTHER

## 2022-07-05 NOTE — TELEPHONE ENCOUNTER
Rx Refill Note  Requested Prescriptions     Pending Prescriptions Disp Refills   • clonazePAM (KlonoPIN) 0.5 MG tablet 30 tablet 2     Sig: Take 1 tablet by mouth At Night As Needed for Anxiety.      Last office visit with prescribing clinician: 6/24/2022      Next office visit with prescribing clinician: 7/18/2022    LA:  04/05/22 #30 2R             Rosalind Rebolledo LPN  07/05/22, 09:29 EDT

## 2022-07-12 ENCOUNTER — APPOINTMENT (OUTPATIENT)
Dept: MAMMOGRAPHY | Facility: HOSPITAL | Age: 72
End: 2022-07-12

## 2022-07-25 ENCOUNTER — TREATMENT (OUTPATIENT)
Dept: PHYSICAL THERAPY | Facility: CLINIC | Age: 72
End: 2022-07-25

## 2022-07-25 DIAGNOSIS — G89.29 CHRONIC PAIN OF RIGHT KNEE: ICD-10-CM

## 2022-07-25 DIAGNOSIS — M25.561 CHRONIC PAIN OF RIGHT KNEE: ICD-10-CM

## 2022-07-25 DIAGNOSIS — G89.29 CHRONIC BILATERAL LOW BACK PAIN WITHOUT SCIATICA: Primary | ICD-10-CM

## 2022-07-25 DIAGNOSIS — M54.50 CHRONIC BILATERAL LOW BACK PAIN WITHOUT SCIATICA: Primary | ICD-10-CM

## 2022-07-25 PROCEDURE — 97110 THERAPEUTIC EXERCISES: CPT | Performed by: PHYSICAL THERAPIST

## 2022-07-25 PROCEDURE — 97161 PT EVAL LOW COMPLEX 20 MIN: CPT | Performed by: PHYSICAL THERAPIST

## 2022-07-25 NOTE — PROGRESS NOTES
Physical Therapy Initial Evaluation and Plan of Care    Patient: Jen MARION May   : 1950  Diagnosis/ICD-10 Code:  Chronic bilateral low back pain without sciatica [M54.50, G89.29]  Referring practitioner: Yi Edouard MD  Date of Initial Visit: 2022  Today's Date: 2022  Patient seen for 1 session         Visit Diagnoses:    ICD-10-CM ICD-9-CM   1. Chronic bilateral low back pain without sciatica  M54.50 724.2    G89.29 338.29   2. Chronic pain of right knee  M25.561 719.46    G89.29 338.29         Subjective Questionnaire: Oswestry: 6%  LEFS: 59/80      Subjective Evaluation    History of Present Illness  Mechanism of injury: Patient notes around a month ago she was having lower back pain after starting to walk on TM at a local [facility].  She was experiencing belt-line lower back pain axial in nature, she was also experiencing some right knee swelling around the same time as well.  She has since had spontaneous improvements of both pains in the last two weeks.  Doesn't experience much pain at the current time.  She is still working full time and spends a lot of time on her feet, has some mild pain when she is upright for a long time.  She hasn't participated in specific exercises for strength or resistance training.      Pain  Current pain ratin  At best pain ratin  At worst pain rating: 3  Quality: dull ache  Relieving factors: rest  Aggravating factors: prolonged positioning and standing    Diagnostic Tests  X-ray: normal    Treatments  Previous treatment: medication  Current treatment: medication  Patient Goals  Patient goals for therapy: decreased pain             Objective          Postural Observations    Additional Postural Observation Details  Mild hyperlordosis of the lumbar spine. No significant knee swelling.     Palpation     Additional Palpation Details  Tender with PA pressures at upper to middle lumbar segments, hypomobility throughout.     Neurological Testing     Sensation      Lumbar   Left   Intact: light touch    Right   Intact: light touch    Reflexes   Left   Patellar (L4): normal (2+)  Achilles (S1): normal (2+)  Clonus sign: negative    Right   Patellar (L4): normal (2+)  Achilles (S1): normal (2+)  Clonus sign: negative    Additional Neurological Details  2+ osorio triceps/biceps/brachioradialis    Active Range of Motion     Lumbar   Flexion: WFL  Extension: 10 degrees with pain    Strength/Myotome Testing     Left Hip   Planes of Motion   Flexion: 4  Extension: 4  Abduction: 3+    Right Hip   Planes of Motion   Flexion: 4  Extension: 3+  Abduction: 4    Left Knee   Flexion: 5  Extension: 5    Right Knee   Flexion: 5  Extension: 5    Right Ankle/Foot   Dorsiflexion: 5    Tests     Lumbar     Left   Negative passive SLR.     Right   Negative passive SLR.     Additional Tests Details  Knee: negative ligament assessments, negative McMurrays          Assessment & Plan     Assessment  Impairments: abnormal or restricted ROM, activity intolerance, impaired physical strength, lacks appropriate home exercise program and pain with function  Functional Limitations: carrying objects, lifting, walking, uncomfortable because of pain, standing, stooping and unable to perform repetitive tasks  Assessment details: Patient presents with resolving complaints of lower back pain and right knee pain with hyperlordosis of lumbar spine and pain with upper/middle lumbar PA's.  She is apprehensive to return to exercising at the [facility] due to pain increase shortly after walking on TM.  Patient to benefit from a few PT sessions to restart recreational exercise with core stabilization emphasis before transitioning to I HEP.  Patient told to resume gym exercises and to avoid overhead press, seated twist, trunk extension.   Prognosis: good    Goals  Plan Goals: STG 2 wks  1. Patient to be compliant with initial HEP  2. Patient to experience no additional pain increase with resuming recreational exercise    LTG 6  wks  1. Patient to be independent with final HEP  2. Patient to demonstrate reduced irritability to mid to upper lumbar PA  3. Patient to report pain 0/10 after 30 minutes of exercise  4. Patient to experience no pain increase with right sided glute max MMT.    Plan  Therapy options: will be seen for skilled therapy services  Planned modality interventions: electrical stimulation/Russian stimulation, dry needling and thermotherapy (hydrocollator packs)  Planned therapy interventions: manual therapy, therapeutic activities, postural training, body mechanics training, functional ROM exercises, flexibility, gait training, stretching, strengthening, joint mobilization, neuromuscular re-education, soft tissue mobilization and spinal/joint mobilization  Frequency: 2x month  Duration in visits: 6  Treatment plan discussed with: patient        History # of Personal Factors and/or Comorbidities: MODERATE (1-2)  Examination of Body System(s): # of elements: LOW (1-2)  Clinical Presentation: STABLE   Clinical Decision Making: LOW       Timed:         Manual Therapy:    0     mins  82629;     Therapeutic Exercise:    14     mins  45753;     Neuromuscular Prince:    0    mins  52943;    Therapeutic Activity:     0     mins  87411;     Gait Trainin     mins  72213;     Ultrasound:     0     mins  44421;    Ionto                               0    mins   96978  Self Care                       0     mins   89324  Canalith Repos    0     mins 89466      Un-Timed:  Electrical Stimulation:    0     mins  41808 ( );  Dry Needling     0     mins self-pay  Traction     0     mins 86414  Low Eval     26     Mins  43077  Mod Eval     0     Mins  87729  High Eval                       0     Mins  22905        Timed Treatment:   14   mins   Total Treatment:     47   mins          PT: Aram Winston, PT     License Number: [id number]    Electronically signed by Aram Winston, PT, 22, 2:05 PM EDT    Certification Period:  7/25/2022 thru 10/22/2022  I certify that the therapy services are furnished while this patient is under my care.  The services outlined above are required by this patient, and will be reviewed every 90 days.         Physician Signature:__________________________________________________    PHYSICIAN: Yi Edouard MD  NPI: [id number]      DATE:     Please sign and return via fax to [email or web address] . Thank you, [facility].

## 2022-08-15 ENCOUNTER — LAB (OUTPATIENT)
Dept: LAB | Facility: HOSPITAL | Age: 72
End: 2022-08-15

## 2022-08-15 ENCOUNTER — TELEPHONE (OUTPATIENT)
Dept: INTERNAL MEDICINE | Facility: CLINIC | Age: 72
End: 2022-08-15

## 2022-08-15 DIAGNOSIS — E55.9 VITAMIN D DEFICIENCY: ICD-10-CM

## 2022-08-15 DIAGNOSIS — E78.2 MIXED HYPERLIPIDEMIA: ICD-10-CM

## 2022-08-15 DIAGNOSIS — I10 MILD ESSENTIAL HYPERTENSION: ICD-10-CM

## 2022-08-15 PROBLEM — D53.9 NUTRITIONAL ANEMIA: Status: RESOLVED | Noted: 2019-03-01 | Resolved: 2022-08-15

## 2022-08-15 LAB
25(OH)D3 SERPL-MCNC: 78.9 NG/ML (ref 30–100)
BASOPHILS # BLD AUTO: 0.07 10*3/MM3 (ref 0–0.2)
BASOPHILS NFR BLD AUTO: 0.8 % (ref 0–1.5)
BILIRUB UR QL STRIP: NEGATIVE
CLARITY UR: ABNORMAL
COLOR UR: YELLOW
DEPRECATED RDW RBC AUTO: 41.8 FL (ref 37–54)
EOSINOPHIL # BLD AUTO: 0.32 10*3/MM3 (ref 0–0.4)
EOSINOPHIL NFR BLD AUTO: 3.8 % (ref 0.3–6.2)
ERYTHROCYTE [DISTWIDTH] IN BLOOD BY AUTOMATED COUNT: 13.3 % (ref 12.3–15.4)
GLUCOSE UR STRIP-MCNC: NEGATIVE MG/DL
HCT VFR BLD AUTO: 41.1 % (ref 34–46.6)
HGB BLD-MCNC: 14 G/DL (ref 12–15.9)
HGB UR QL STRIP.AUTO: NEGATIVE
IMM GRANULOCYTES # BLD AUTO: 0.03 10*3/MM3 (ref 0–0.05)
IMM GRANULOCYTES NFR BLD AUTO: 0.4 % (ref 0–0.5)
KETONES UR QL STRIP: NEGATIVE
LEUKOCYTE ESTERASE UR QL STRIP.AUTO: NEGATIVE
LYMPHOCYTES # BLD AUTO: 2.61 10*3/MM3 (ref 0.7–3.1)
LYMPHOCYTES NFR BLD AUTO: 31.1 % (ref 19.6–45.3)
MCH RBC QN AUTO: 29.5 PG (ref 26.6–33)
MCHC RBC AUTO-ENTMCNC: 34.1 G/DL (ref 31.5–35.7)
MCV RBC AUTO: 86.7 FL (ref 79–97)
MONOCYTES # BLD AUTO: 0.77 10*3/MM3 (ref 0.1–0.9)
MONOCYTES NFR BLD AUTO: 9.2 % (ref 5–12)
NEUTROPHILS NFR BLD AUTO: 4.59 10*3/MM3 (ref 1.7–7)
NEUTROPHILS NFR BLD AUTO: 54.7 % (ref 42.7–76)
NITRITE UR QL STRIP: NEGATIVE
NRBC BLD AUTO-RTO: 0 /100 WBC (ref 0–0.2)
PH UR STRIP.AUTO: 6 [PH] (ref 5–8)
PLATELET # BLD AUTO: 342 10*3/MM3 (ref 140–450)
PMV BLD AUTO: 9.8 FL (ref 6–12)
PROT UR QL STRIP: NEGATIVE
RBC # BLD AUTO: 4.74 10*6/MM3 (ref 3.77–5.28)
SP GR UR STRIP: 1.02 (ref 1–1.03)
UROBILINOGEN UR QL STRIP: ABNORMAL
WBC NRBC COR # BLD: 8.39 10*3/MM3 (ref 3.4–10.8)

## 2022-08-15 PROCEDURE — 81001 URINALYSIS AUTO W/SCOPE: CPT

## 2022-08-15 PROCEDURE — 82306 VITAMIN D 25 HYDROXY: CPT

## 2022-08-15 PROCEDURE — 85025 COMPLETE CBC W/AUTO DIFF WBC: CPT

## 2022-08-15 PROCEDURE — 84443 ASSAY THYROID STIM HORMONE: CPT

## 2022-08-15 PROCEDURE — 80053 COMPREHEN METABOLIC PANEL: CPT

## 2022-08-15 PROCEDURE — 80061 LIPID PANEL: CPT

## 2022-08-15 PROCEDURE — 82570 ASSAY OF URINE CREATININE: CPT

## 2022-08-15 PROCEDURE — 82043 UR ALBUMIN QUANTITATIVE: CPT

## 2022-08-15 NOTE — TELEPHONE ENCOUNTER
Caller: Jen Cook    Relationship: Self    Best call back number: [phone]    What orders are you requesting (i.e. lab or imaging): LAB ORDERS    In what timeframe would the patient need to come in: THIS MORNING    Where will you receive your lab/imaging services: IN OFFICE    Additional notes:   PATIENT NEEDS FASTING LAB ORDERS. PLEASE ADVISE AND CALL PATIENT BACK

## 2022-08-16 LAB
ALBUMIN SERPL-MCNC: 4.5 G/DL (ref 3.5–5.2)
ALBUMIN UR-MCNC: <1.2 MG/DL
ALBUMIN/GLOB SERPL: 1.7 G/DL
ALP SERPL-CCNC: 64 U/L (ref 39–117)
ALT SERPL W P-5'-P-CCNC: 13 U/L (ref 1–33)
ANION GAP SERPL CALCULATED.3IONS-SCNC: 15.5 MMOL/L (ref 5–15)
AST SERPL-CCNC: 14 U/L (ref 1–32)
BACTERIA UR QL AUTO: ABNORMAL /HPF
BILIRUB SERPL-MCNC: 0.4 MG/DL (ref 0–1.2)
BUN SERPL-MCNC: 17 MG/DL (ref 8–23)
BUN/CREAT SERPL: 21.5 (ref 7–25)
CALCIUM SPEC-SCNC: 9.2 MG/DL (ref 8.6–10.5)
CHLORIDE SERPL-SCNC: 99 MMOL/L (ref 98–107)
CHOLEST SERPL-MCNC: 205 MG/DL (ref 0–200)
CO2 SERPL-SCNC: 24.5 MMOL/L (ref 22–29)
CREAT SERPL-MCNC: 0.79 MG/DL (ref 0.57–1)
CREAT UR-MCNC: 68.9 MG/DL
EGFRCR SERPLBLD CKD-EPI 2021: 79.1 ML/MIN/1.73
GLOBULIN UR ELPH-MCNC: 2.7 GM/DL
GLUCOSE SERPL-MCNC: 76 MG/DL (ref 65–99)
HDLC SERPL-MCNC: 58 MG/DL (ref 40–60)
HYALINE CASTS UR QL AUTO: ABNORMAL /LPF
LDLC SERPL CALC-MCNC: 122 MG/DL (ref 0–100)
LDLC/HDLC SERPL: 2.04 {RATIO}
MICROALBUMIN/CREAT UR: NORMAL MG/G{CREAT}
POTASSIUM SERPL-SCNC: 4.1 MMOL/L (ref 3.5–5.2)
PROT SERPL-MCNC: 7.2 G/DL (ref 6–8.5)
RBC # UR STRIP: ABNORMAL /HPF
REF LAB TEST METHOD: ABNORMAL
SODIUM SERPL-SCNC: 139 MMOL/L (ref 136–145)
SQUAMOUS #/AREA URNS HPF: ABNORMAL /HPF
TRIGL SERPL-MCNC: 144 MG/DL (ref 0–150)
TSH SERPL DL<=0.05 MIU/L-ACNC: 1.86 UIU/ML (ref 0.27–4.2)
VLDLC SERPL-MCNC: 25 MG/DL (ref 5–40)
WBC # UR STRIP: ABNORMAL /HPF

## 2022-08-23 ENCOUNTER — TELEPHONE (OUTPATIENT)
Dept: INTERNAL MEDICINE | Facility: CLINIC | Age: 72
End: 2022-08-23

## 2022-08-23 RX ORDER — DOXYCYCLINE HYCLATE 100 MG/1
100 CAPSULE ORAL 2 TIMES DAILY
Qty: 14 CAPSULE | Refills: 0 | Status: SHIPPED | OUTPATIENT
Start: 2022-08-23 | End: 2022-09-12

## 2022-08-23 RX ORDER — DOXYCYCLINE HYCLATE 100 MG/1
100 CAPSULE ORAL 2 TIMES DAILY
Qty: 14 CAPSULE | Refills: 0 | Status: SHIPPED | OUTPATIENT
Start: 2022-08-23 | End: 2022-08-23 | Stop reason: SDUPTHER

## 2022-08-23 NOTE — TELEPHONE ENCOUNTER
"    Caller: Jen Cook SANAM    Relationship: Self    Best call back number: [phone]    What medication are you requesting: MEDICATION REQUEST    What are your current symptoms: BLISTER ON SIDE OF NOSE    How long have you been experiencing symptoms: N/A    Have you had these symptoms before:    [] Yes  [] No    Have you been treated for these symptoms before:   [] Yes  [] No    If a prescription is needed, what is your preferred pharmacy and phone number: [facility] #[id number] - [place], [place] - [address] [facility] [phone] [facility] [phone]      Additional notes: PATIENT STATED THAT SHE HAS BEEN BLOWING HER NOSE A LOT AND HAS A SMALL BLISTER THAT HAS CAME UP ON SIDE OF HER NOSE AND SHE REMEMBERS \"CYCLIN\" BEING IN THE NAME OF THE MEDICATION THAT WORKS THE BEST FOR HER AND WANTED TO KNOW IF PROVIDER WOULD SEND THIS INTO PHARMACY       PLEASE ADVISE        "

## 2022-08-25 RX ORDER — ESTRADIOL 0.04 MG/D
1 PATCH TRANSDERMAL WEEKLY
Qty: 4 PATCH | Refills: 5 | Status: SHIPPED | OUTPATIENT
Start: 2022-08-25 | End: 2023-01-16

## 2022-08-25 NOTE — TELEPHONE ENCOUNTER
Rx Refill Note  Requested Prescriptions     Pending Prescriptions Disp Refills   • estradiol (CLIMARA) 0.0375 MG/24HR [Pharmacy Med Name: ESTRADIOL 0.0375MG PATCH(1/WK)] 4 patch 5     Sig: PLACE 1 PATCH ON THE SKIN AS DIRECTED BY PROVIDER 1 (ONE) TIME PER WEEK.      Last office visit with prescribing clinician: 6/24/2022      Next office visit with prescribing clinician: 9/1/2022            Shauna Mo LPN  08/25/22, 13:18 EDT

## 2022-09-12 ENCOUNTER — OFFICE VISIT (OUTPATIENT)
Dept: INTERNAL MEDICINE | Facility: CLINIC | Age: 72
End: 2022-09-12

## 2022-09-12 VITALS
SYSTOLIC BLOOD PRESSURE: 124 MMHG | WEIGHT: 143.6 LBS | OXYGEN SATURATION: 96 % | TEMPERATURE: 98.6 F | HEIGHT: 66 IN | DIASTOLIC BLOOD PRESSURE: 82 MMHG | HEART RATE: 78 BPM | BODY MASS INDEX: 23.08 KG/M2

## 2022-09-12 DIAGNOSIS — F51.01 PRIMARY INSOMNIA: ICD-10-CM

## 2022-09-12 DIAGNOSIS — E55.9 VITAMIN D DEFICIENCY: ICD-10-CM

## 2022-09-12 DIAGNOSIS — E78.2 MIXED HYPERLIPIDEMIA: ICD-10-CM

## 2022-09-12 DIAGNOSIS — I10 MILD ESSENTIAL HYPERTENSION: Primary | ICD-10-CM

## 2022-09-12 DIAGNOSIS — L65.8 FEMALE PATTERN HAIR LOSS: Chronic | ICD-10-CM

## 2022-09-12 DIAGNOSIS — F41.1 GENERALIZED ANXIETY DISORDER: ICD-10-CM

## 2022-09-12 PROCEDURE — 99214 OFFICE O/P EST MOD 30 MIN: CPT | Performed by: INTERNAL MEDICINE

## 2022-09-12 RX ORDER — BISOPROLOL FUMARATE 5 MG/1
5 TABLET, FILM COATED ORAL NIGHTLY
Qty: 90 TABLET | Refills: 1 | Status: SHIPPED | OUTPATIENT
Start: 2022-09-12 | End: 2022-09-30

## 2022-09-12 RX ORDER — ACETAMINOPHEN 160 MG
2000 TABLET,DISINTEGRATING ORAL DAILY
Qty: 30 CAPSULE | Refills: 11
Start: 2022-09-12 | End: 2022-10-08

## 2022-09-12 RX ORDER — CLONAZEPAM 0.5 MG/1
0.5 TABLET ORAL NIGHTLY PRN
Qty: 30 TABLET | Refills: 2 | Status: SHIPPED | OUTPATIENT
Start: 2022-09-12 | End: 2023-01-03 | Stop reason: SDUPTHER

## 2022-09-12 NOTE — ASSESSMENT & PLAN NOTE
Increase rosuvastatin to 0.5 tablet four nights a week. Continue to decrease fats and sugars in the diet. Increase regular exercise and walking.

## 2022-09-12 NOTE — PROGRESS NOTES
[facility]     Jen B May  1950   [id number]      Patient Care Team:  Yi Edouard MD as PCP - General (Internal Medicine)  Pierre Carreno MD as Consulting Physician (Gastroenterology)  Víctor Tanner MD (Inactive) as Consulting Physician (Gynecology)  Leonel Ren MD as Consulting Physician (Orthopedic Surgery)    Chief Complaint   Patient presents with   • Hypertension   • Hyperlipidemia            HPI  Patient is a 72 y.o. female presents for a follow up.    Hypertension  Her blood pressure today is 124/82 mmHg. She reports that her blood pressure is elevated when she is at work, which she attributes to anxiety. She notes that her diastolic blood pressure can be 100 mmHg at work, and then it decreases. She denies feeling anxious when she checks her blood pressure. She reports that she can feel her blood pressure rise when she is moving around, talking to someone, or trying to do something. She states that she sits down and takes a deep breath, and then it stabilizes. She reports that it can happen every day. She reports that she feels her heart racing. She states that she does drink 1 cup of caffeine in the morning, which is diluted with cream. She reports that she tries to avoid salt in her diet most of the time. She reports that she was doing really good with exercise joining the [employer], but then she had a persistent sinus infection. She states that she can get her heart rate to 120 bpm when she is on the treadmill, and then it comes down.     Hyperlipidemia  She reports that she is taking rosuvastatin 0.5 tablet approximately 3 times a week, and she has not noticed any side effects. She reports that she tries to eat low-fat diet, and does not eat any fried foods.    Vitamin D deficiency  She reports that she is taking 5000 units of vitamin D every day.    Insomnia  She reports that she takes 1 temazepam every night. She states that she sleeps good, but now she  gets up at 5:30 AM or 6:00 AM. She reports that she goes to bed at 10:00 PM, and reads. She reports that she takes clonazepam every night with the temazepam. She reports that she does not feel anxious as it gets closer to bedtime. She reports that she has never taken melatonin.    Hair loss  She reports that she is losing hair at the top of her head. She denies any irritation of the scalp.        CHRONIC CONDITIONS      Past Medical History:   Diagnosis Date   • Anxiety    • Benign essential hypertension with target blood pressure below 140/90    • Depression    • Generalized anxiety disorder with panic attacks    • Generalized anxiety disorder with panic attacks 1/7/2019   • Major depressive disorder with single episode    • Mixed hyperlipidemia    • Nutritional anemia 3/1/2019   • Post-menopause on HRT (hormone replacement therapy)    • Primary insomnia    • Rectal pain 4/2/2019   • Stroke (HCC) 2008    R hemispheric Stroke   • Vertigo    • Vitamin D deficiency        Past Surgical History:   Procedure Laterality Date   • AUGMENTATION MAMMAPLASTY  1990   • BREAST AUGMENTATION Bilateral 1989   • LAPAROSCOPIC ASSISTED VAGINAL HYSTERECTOMY SALPINGO OOPHORECTOMY Bilateral    • LAPAROSCOPIC HERNANDEZ PROCEDURE     • PARTIAL KNEE ARTHROPLASTY  2015    Partial knee replacement 2 years ago       Family History   Adopted: Yes   Family history unknown: Yes       Social History     Socioeconomic History   • Marital status:    Tobacco Use   • Smoking status: Never Smoker   • Smokeless tobacco: Never Used   Vaping Use   • Vaping Use: Never used   Substance and Sexual Activity   • Alcohol use: Yes     Comment: socially    • Drug use: No   • Sexual activity: Defer       No Known Allergies    Vital Signs  Vitals:    09/12/22 1615   BP: 124/82   BP Location: Left arm   Patient Position: Sitting   Cuff Size: Adult   Pulse: 78   Temp: 98.6 °F (37 °C)   TempSrc: Infrared   SpO2: 96%   Weight: 65.1 kg (143 lb 9.6 oz)   Height:  "166.4 cm (65.51\")   PainSc: 0-No pain     Body mass index is 23.53 kg/m².  BMI is within normal parameters. No other follow-up for BMI required.        Current Outpatient Medications:   •  clonazePAM (KlonoPIN) 0.5 MG tablet, Take 1 tablet by mouth At Night As Needed for Anxiety., Disp: 30 tablet, Rfl: 2  •  [facility] CoQ-10 200 MG capsule, Take 2 capsules by mouth Every Night., Disp: , Rfl:   •  estradiol (CLIMARA) 0.0375 MG/24HR, PLACE 1 PATCH ON THE SKIN AS DIRECTED BY PROVIDER 1 (ONE) TIME PER WEEK., Disp: 4 patch, Rfl: 5  •  famotidine (PEPCID) 20 MG tablet, Take 1 tablet by mouth 2 (Two) Times a Day., Disp: 180 tablet, Rfl: 1  •  fluconazole (DIFLUCAN) 150 MG tablet, Take 1 tablet by mouth Daily As Needed (yeast vaginitis)., Disp: 3 tablet, Rfl: 0  •  rosuvastatin (CRESTOR) 5 MG tablet, Take 0.5 tablets by mouth Every Night. (Patient taking differently: Take 2.5 mg by mouth 3 (Three) Times a Week.), Disp: 46 tablet, Rfl: 1  •  temazepam (RESTORIL) 15 MG capsule, TAKE 1-2 CAPSULES BY ORAL ROUTE AT BEDTIME AS NEEDED, Disp: 60 capsule, Rfl: 2  •  venlafaxine XR (EFFEXOR-XR) 75 MG 24 hr capsule, Take 1 capsule by mouth Every Morning., Disp: 90 capsule, Rfl: 1  •  bisoprolol (ZEBeta) 5 MG tablet, Take 1 tablet by mouth Every Night., Disp: 90 tablet, Rfl: 1  •  Cholecalciferol (Vitamin D3) 50 MCG (2000 UT) capsule, Take 1 capsule by mouth Daily., Disp: 30 capsule, Rfl: 11    Physical Exam:    Physical Exam  Vitals and nursing note reviewed.   Constitutional:       Appearance: She is well-developed.   HENT:      Head: Normocephalic.   Eyes:      Conjunctiva/sclera: Conjunctivae normal.      Pupils: Pupils are equal, round, and reactive to light.   Neck:      Thyroid: No thyromegaly.   Cardiovascular:      Rate and Rhythm: Normal rate and regular rhythm.      Heart sounds: Normal heart sounds.   Pulmonary:      Effort: Pulmonary effort is normal.      Breath sounds: Normal breath sounds. No wheezing.   Musculoskeletal:    "      General: Normal range of motion.      Cervical back: Normal range of motion and neck supple.   Lymphadenopathy:      Cervical: No cervical adenopathy.   Skin:     General: Skin is warm and dry.   Neurological:      Mental Status: She is alert and oriented to person, place, and time.   Psychiatric:         Thought Content: Thought content normal.          ACE III MINI        Results Review:    I reviewed the patient's new clinical results.    CMP:  Lab Results   Component Value Date    BUN 17 08/15/2022    CREATININE 0.79 08/15/2022    EGFRIFNONA 80 12/30/2020    BCR 21.5 08/15/2022     08/15/2022    K 4.1 08/15/2022    CO2 24.5 08/15/2022    CALCIUM 9.2 08/15/2022    ALBUMIN 4.50 08/15/2022    BILITOT 0.4 08/15/2022    ALKPHOS 64 08/15/2022    AST 14 08/15/2022    ALT 13 08/15/2022     HbA1c:  No results found for: HGBA1C  Microalbumin:  Lab Results   Component Value Date    MICROALBUR <1.2 08/15/2022     Lipid Panel  Lab Results   Component Value Date    CHOL 205 (H) 08/15/2022    TRIG 144 08/15/2022    HDL 58 08/15/2022     (H) 08/15/2022    AST 14 08/15/2022    ALT 13 08/15/2022       Medication Review: Medications reviewed and noted  Patient Instructions   Problem List Items Addressed This Visit        Cardiac and Vasculature    Mixed hyperlipidemia    Overview     Taking 1/2 of the 5 mg rosuvastatin tablet 3 times a week.         Current Assessment & Plan     Increase rosuvastatin to 0.5 tablet four nights a week. Continue to decrease fats and sugars in the diet. Increase regular exercise and walking.         Relevant Medications    rosuvastatin (CRESTOR) 5 MG tablet    Mild essential hypertension - Primary    Current Assessment & Plan     Take bisoprolol 5 mg every evening. Continue to avoid salt in the diet. Try to get more regular exercise.         Relevant Medications    bisoprolol (ZEBeta) 5 MG tablet       Endocrine and Metabolic    Vitamin D deficiency    Current Assessment & Plan      "Level is now very good, so decrease the 5000 units of vitamin D3 down to 2000 units daily.            Mental Health    Generalized anxiety disorder    Overview     Taking venlafaxine XR 75 mg daily and continue clonazepam in the evenings.         Current Assessment & Plan     Continue current dose of venlafaxine daily. Exercise and physical activity also helps decrease symptoms of stress, anxiety, and mood. Continue clonazepam in the evenings.         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            Sleep    Primary insomnia    Overview     Taking clonazepam and temazepam every evening.         Current Assessment & Plan     We will try to taper off of the temazepam. She will open the capsule and take half of the granules every evening for about 1 week and then tried to cut down to about a third of the granules in the evening. Gradually stop it. She will continue clonazepam every evening. She will also continue relaxation and reading every evening. She should also increase regular exercise which helps us sleep better at night.         Relevant Medications    temazepam (RESTORIL) 15 MG capsule             Diagnosis Plan   1. Mild essential hypertension     2. Mixed hyperlipidemia     3. Generalized anxiety disorder  clonazePAM (KlonoPIN) 0.5 MG tablet   4. Primary insomnia     5. Female pattern hair loss     6. Vitamin D deficiency             Plan of care reviewed with patient at the conclusion of today's visit. Education was provided regarding diagnosis, management, and any prescribed or recommended OTC medications.Patient verbalizes understanding of and agreement with management plan.       Transcribed from ambient dictation for Yi SALAMANCA" MD Javan by Ashley Benavides.  09/12/22   20:03 EDT    Patient verbalized consent to the visit recording.  I have personally performed the services described in this document as transcribed by the above individual, and it is both accurate and complete.  Yi Edouard MD  9/15/2022  08:11 EDT

## 2022-09-12 NOTE — ASSESSMENT & PLAN NOTE
Take bisoprolol 5 mg every evening. Continue to avoid salt in the diet. Try to get more regular exercise.

## 2022-09-13 NOTE — ASSESSMENT & PLAN NOTE
We will try to taper off of the temazepam. She will open the capsule and take half of the granules every evening for about 1 week and then tried to cut down to about a third of the granules in the evening. Gradually stop it. She will continue clonazepam every evening. She will also continue relaxation and reading every evening. She should also increase regular exercise which helps us sleep better at night.

## 2022-09-13 NOTE — ASSESSMENT & PLAN NOTE
Continue current dose of venlafaxine daily. Exercise and physical activity also helps decrease symptoms of stress, anxiety, and mood. Continue clonazepam in the evenings.

## 2022-09-15 NOTE — PATIENT INSTRUCTIONS
"Patient Instructions   Problem List Items Addressed This Visit          Cardiac and Vasculature    Mixed hyperlipidemia    Overview     Taking 1/2 of the 5 mg rosuvastatin tablet 3 times a week.         Current Assessment & Plan     Increase rosuvastatin to 0.5 tablet four nights a week. Continue to decrease fats and sugars in the diet. Increase regular exercise and walking.         Relevant Medications    rosuvastatin (CRESTOR) 5 MG tablet    Mild essential hypertension - Primary    Current Assessment & Plan     Take bisoprolol 5 mg every evening. Continue to avoid salt in the diet. Try to get more regular exercise.         Relevant Medications    bisoprolol (ZEBeta) 5 MG tablet       Endocrine and Metabolic    Vitamin D deficiency    Current Assessment & Plan     Level is now very good, so decrease the 5000 units of vitamin D3 down to 2000 units daily.            Mental Health    Generalized anxiety disorder    Overview     Taking venlafaxine XR 75 mg daily and continue clonazepam in the evenings.         Current Assessment & Plan     Continue current dose of venlafaxine daily. Exercise and physical activity also helps decrease symptoms of stress, anxiety, and mood. Continue clonazepam in the evenings.         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            Sleep    Primary insomnia    Overview     Taking clonazepam and temazepam every evening.         Current Assessment & Plan     We will try to taper off of the temazepam. She will open the capsule and take half of the granules every evening for about 1 week and then tried to cut down to about a third of the granules in the evening. Gradually stop it. She will continue clonazepam every " Case discussed in Thoracic conference. She is not a surgical candidate but there may be a role for focal RT and then continue on tarceva. Discussed this with patient and she is agreeable to meet with Rad/Onc. Can you schedule her to see either Dr. Ferrera/Dr. Knapp or Dr. Nicholas as they were at the meeting today    evening. She will also continue relaxation and reading every evening. She should also increase regular exercise which helps us sleep better at night.         Relevant Medications    temazepam (RESTORIL) 15 MG capsule

## 2022-09-20 ENCOUNTER — TELEPHONE (OUTPATIENT)
Dept: INTERNAL MEDICINE | Facility: CLINIC | Age: 72
End: 2022-09-20

## 2022-09-20 NOTE — TELEPHONE ENCOUNTER
So blood pressure is actually lower now.  Ask her what the heart rate is now and whether it is regular or irregular

## 2022-09-20 NOTE — TELEPHONE ENCOUNTER
While on bisoprolol BP was 169/55, 130/54, 104/54, 132/54. Right now it is 88/74. She is also decreasing her temazepam and states she feels very anxious.

## 2022-09-20 NOTE — TELEPHONE ENCOUNTER
At her last office visit, we ordered bisoprolol 5 mg every evening to help control the blood pressure.  Please find out if she has been taking it in the evenings and what the blood pressures have been-both before she stopped it and after she stopped it

## 2022-09-20 NOTE — TELEPHONE ENCOUNTER
Please advise her to stay off of the bisoprolol for now.  Drink lots of fluids.  Continue checking blood pressure once a day and recording the blood pressure and the heart rate call us back in a week or 2 with the values.

## 2022-09-20 NOTE — TELEPHONE ENCOUNTER
Caller: MayJen    Relationship: Self    Best call back number: [phone]    What medications are you currently taking:   Current Outpatient Medications on File Prior to Visit   Medication Sig Dispense Refill   • bisoprolol (ZEBeta) 5 MG tablet Take 1 tablet by mouth Every Night. 90 tablet 1   • Cholecalciferol (Vitamin D3) 50 MCG (2000 UT) capsule Take 1 capsule by mouth Daily. 30 capsule 11   • clonazePAM (KlonoPIN) 0.5 MG tablet Take 1 tablet by mouth At Night As Needed for Anxiety. 30 tablet 2   • [facility] CoQ-10 200 MG capsule Take 2 capsules by mouth Every Night.     • estradiol (CLIMARA) 0.0375 MG/24HR PLACE 1 PATCH ON THE SKIN AS DIRECTED BY PROVIDER 1 (ONE) TIME PER WEEK. 4 patch 5   • famotidine (PEPCID) 20 MG tablet Take 1 tablet by mouth 2 (Two) Times a Day. 180 tablet 1   • fluconazole (DIFLUCAN) 150 MG tablet Take 1 tablet by mouth Daily As Needed (yeast vaginitis). 3 tablet 0   • rosuvastatin (CRESTOR) 5 MG tablet Take 0.5 tablets by mouth Every Night. (Patient taking differently: Take 2.5 mg by mouth 3 (Three) Times a Week.) 46 tablet 1   • temazepam (RESTORIL) 15 MG capsule TAKE 1-2 CAPSULES BY ORAL ROUTE AT BEDTIME AS NEEDED 60 capsule 2   • venlafaxine XR (EFFEXOR-XR) 75 MG 24 hr capsule Take 1 capsule by mouth Every Morning. 90 capsule 1     No current facility-administered medications on file prior to visit.          When did you start taking these medications: N/A    Which medication are you concerned about: BETA BLOCKER MEDICATION    Who prescribed you this medication: GUERLINE    What are your concerns: PATIENT STATED THAT OVER WEEKEND SHE HAD TOOK THE MEDICATION AND MADE HER BLOOD PRESSURE DROP SO SHE QUIT TAKING IT FOR NOW AND WANTED TO SEE WHAT PROVIDER WOULD WANT TO DO     PATIENT DID NOT KNOW THE NAME OF MEDICATION     PLEASE ADVISE ASAP     How long have you had these concerns: OVER WEEKEND

## 2022-09-30 ENCOUNTER — OFFICE VISIT (OUTPATIENT)
Dept: INTERNAL MEDICINE | Facility: CLINIC | Age: 72
End: 2022-09-30

## 2022-09-30 VITALS
WEIGHT: 144.2 LBS | TEMPERATURE: 98 F | HEIGHT: 66 IN | DIASTOLIC BLOOD PRESSURE: 86 MMHG | BODY MASS INDEX: 23.18 KG/M2 | OXYGEN SATURATION: 98 % | HEART RATE: 70 BPM | SYSTOLIC BLOOD PRESSURE: 132 MMHG

## 2022-09-30 DIAGNOSIS — E78.2 MIXED HYPERLIPIDEMIA: ICD-10-CM

## 2022-09-30 DIAGNOSIS — F51.01 PRIMARY INSOMNIA: Primary | ICD-10-CM

## 2022-09-30 DIAGNOSIS — F32.4 MAJOR DEPRESSIVE DISORDER WITH SINGLE EPISODE, IN PARTIAL REMISSION: ICD-10-CM

## 2022-09-30 DIAGNOSIS — I10 MILD ESSENTIAL HYPERTENSION: ICD-10-CM

## 2022-09-30 PROCEDURE — 99214 OFFICE O/P EST MOD 30 MIN: CPT | Performed by: INTERNAL MEDICINE

## 2022-09-30 RX ORDER — TEMAZEPAM 7.5 MG/1
7.5 CAPSULE ORAL NIGHTLY PRN
Qty: 30 CAPSULE | Refills: 1 | Status: SHIPPED | OUTPATIENT
Start: 2022-09-30 | End: 2022-10-08

## 2022-09-30 NOTE — ASSESSMENT & PLAN NOTE
- She will stay off of medication at this point.  - The patient will continue checking the blood pressure a couple times a week and write them down to bring to the next appointment.  - We want the systolic (the top number) to be less than 130 mmHg.  - She will try to decrease salt in the diet more.  - Increase exercise and walking.

## 2022-09-30 NOTE — PATIENT INSTRUCTIONS
Patient Instructions   Problem List Items Addressed This Visit          Cardiac and Vasculature    Mixed hyperlipidemia    Overview     Taking 1/2 of the 5 mg rosuvastatin tablet 3 times a week.         Current Assessment & Plan     - She will increase the rosuvastatin to half tablet 4 times a week.  - Continue to improve low-fat, low-carbohydrate diet.         Relevant Medications    rosuvastatin (CRESTOR) 5 MG tablet    Mild essential hypertension    Overview     SBP got down to 50s on bisoprolol. Off it SBP has been 130 or less.         Current Assessment & Plan     - She will stay off of medication at this point.  - The patient will continue checking the blood pressure a couple times a week and write them down to bring to the next appointment.  - We want the systolic (the top number) to be less than 130 mmHg.  - She will try to decrease salt in the diet more.  - Increase exercise and walking.            Mental Health    Major depressive disorder with single episode, in partial remission (HCC)    Overview     Taking venlafaxine XR 75 mg daily.         Current Assessment & Plan     - She will continue current dose of venlafaxine daily.  - Increase walking and physical activity.         Relevant Medications    venlafaxine XR (EFFEXOR-XR) 75 MG 24 hr capsule    temazepam (RESTORIL) 7.5 MG capsule       Sleep    Primary insomnia - Primary    Overview     She has decreased  temazepam have the 15 mg capsule every evening.         Current Assessment & Plan     - The patient will continue the gradual decrease of the temazepam dose.  - She may add melatonin from 1 mg up to 15 mg every evening about an hour before bedtime to help with sleep.  - We discussed sleep hygiene including going to bed at the same time and getting up at the same time every day, going to bed early enough to get 7 or 8 hours in bed, reading and relaxing before bedtime, and avoiding the TV, computer, phone, iPad close to bedtime.         Relevant  Medications    temazepam (RESTORIL) 7.5 MG capsule

## 2022-09-30 NOTE — ASSESSMENT & PLAN NOTE
- The patient will continue the gradual decrease of the temazepam dose.  - She may add melatonin from 1 mg up to 15 mg every evening about an hour before bedtime to help with sleep.  - We discussed sleep hygiene including going to bed at the same time and getting up at the same time every day, going to bed early enough to get 7 or 8 hours in bed, reading and relaxing before bedtime, and avoiding the TV, computer, phone, iPad close to bedtime.

## 2022-09-30 NOTE — ASSESSMENT & PLAN NOTE
- She will increase the rosuvastatin to half tablet 4 times a week.  - Continue to improve low-fat, low-carbohydrate diet.

## 2022-09-30 NOTE — PROGRESS NOTES
[facility]     Jen MARION May  1950   [id number]      Patient Care Team:  Yi Edouard MD as PCP - General (Internal Medicine)  Pierre Carreno MD as Consulting Physician (Gastroenterology)  Víctor Tanner MD (Inactive) as Consulting Physician (Gynecology)  Leonel Ren MD as Consulting Physician (Orthopedic Surgery)    CC:sleep and BP      HPI  Patient is a 72 y.o. female presents today for follow-up.    Hypertension  The patient's blood pressure this morning was 132/86 mmHg. She states her blood pressure at home has not been that high. The patient stopped taking the bisoprolol because it made her blood pressure low at 56 mmHg. Since she stopped taking the bisoprolol, her blood pressure has been good. The patient checked her blood pressure with an arm cuff and it was never over 130 mmHg systolic. She states the diastolic number might go to 80 mmHg, but not consistently. The patient does not drink any caffeinated drinks. She eats 3 to 5 servings of pretzels with peanut butter after she gets home from work. The patient eats a lot of salads and broccoli. She does not exercise yet.    Insomnia  The patient has been titrating off of the temazepam and is down to a half tablet every night for 3 weeks. The patient states that when she first started titrating off of the temazepam, she was nauseous. She woke up at 4:00 AM, but she went back to sleep. The patient goes to bed at 10:00 PM. She does not sleep through the night, but she has been able to go back to sleep. The patient did not want to take clonazepam and add that to the whole mix because it defeats the purpose. She would like to stick it out and continue the taper. The patient has not tried taking melatonin before. She will try adding melatonin 3 to 5 mg (up to 15 mg) 1 hour before bedtime. For ease of titration and cutting pills in half, she asked for a 7.5 mg temazepam.     Hyperlipidemia  The patient states that she is  taking half of the 5 mg rosuvastatin about 3 times a week and will increase it to 4 times per week. She states that she is tolerating it well.    Depression and anxiety  The patient states that the venlafaxine is doing okay.     Health maintenance  The patient states that she was going to get her influenza vaccine, but she is driving to [place] tomorrow. She will come in on Monday, 10/03/2022, for the influenza vaccine and COVID-19 booster.        CHRONIC CONDITIONS      Past Medical History:   Diagnosis Date   • Anxiety    • Benign essential hypertension with target blood pressure below 140/90    • Depression    • Generalized anxiety disorder with panic attacks    • Generalized anxiety disorder with panic attacks 1/7/2019   • Major depressive disorder with single episode    • Mixed hyperlipidemia    • Nutritional anemia 3/1/2019   • Post-menopause on HRT (hormone replacement therapy)    • Primary insomnia    • Rectal pain 4/2/2019   • Stroke (HCC) 2008    R hemispheric Stroke   • Vertigo    • Vitamin D deficiency        Past Surgical History:   Procedure Laterality Date   • AUGMENTATION MAMMAPLASTY  1990   • BREAST AUGMENTATION Bilateral 1989   • LAPAROSCOPIC ASSISTED VAGINAL HYSTERECTOMY SALPINGO OOPHORECTOMY Bilateral    • LAPAROSCOPIC HERNANDEZ PROCEDURE     • PARTIAL KNEE ARTHROPLASTY  2015    Partial knee replacement 2 years ago       Family History   Adopted: Yes   Family history unknown: Yes       Social History     Socioeconomic History   • Marital status:    Tobacco Use   • Smoking status: Never Smoker   • Smokeless tobacco: Never Used   Vaping Use   • Vaping Use: Never used   Substance and Sexual Activity   • Alcohol use: Yes     Comment: socially    • Drug use: No   • Sexual activity: Defer       No Known Allergies      Vital Signs  Vitals:    09/30/22 0843   BP: 132/86   BP Location: Left arm   Patient Position: Sitting   Cuff Size: Adult   Pulse: 70   Temp: 98 °F (36.7 °C)   TempSrc: Infrared  "  SpO2: 98%   Weight: 65.4 kg (144 lb 3.2 oz)   Height: 166.4 cm (65.51\")   PainSc: 0-No pain     Body mass index is 23.62 kg/m².  BMI is within normal parameters. No other follow-up for BMI required.        Current Outpatient Medications:   •  Cholecalciferol (Vitamin D3) 50 MCG (2000 UT) capsule, Take 1 capsule by mouth Daily., Disp: 30 capsule, Rfl: 11  •  clonazePAM (KlonoPIN) 0.5 MG tablet, Take 1 tablet by mouth At Night As Needed for Anxiety., Disp: 30 tablet, Rfl: 2  •  [employer] CoQ-10 200 MG capsule, Take 2 capsules by mouth Every Night., Disp: , Rfl:   •  estradiol (CLIMARA) 0.0375 MG/24HR, PLACE 1 PATCH ON THE SKIN AS DIRECTED BY PROVIDER 1 (ONE) TIME PER WEEK., Disp: 4 patch, Rfl: 5  •  famotidine (PEPCID) 20 MG tablet, Take 1 tablet by mouth 2 (Two) Times a Day., Disp: 180 tablet, Rfl: 1  •  fluconazole (DIFLUCAN) 150 MG tablet, Take 1 tablet by mouth Daily As Needed (yeast vaginitis)., Disp: 3 tablet, Rfl: 0  •  rosuvastatin (CRESTOR) 5 MG tablet, Take 0.5 tablets by mouth Every Night. (Patient taking differently: Take 2.5 mg by mouth 3 (Three) Times a Week.), Disp: 46 tablet, Rfl: 1  •  venlafaxine XR (EFFEXOR-XR) 75 MG 24 hr capsule, Take 1 capsule by mouth Every Morning., Disp: 90 capsule, Rfl: 1  •  temazepam (RESTORIL) 7.5 MG capsule, Take 1 capsule by mouth At Night As Needed for Sleep., Disp: 30 capsule, Rfl: 1    Physical Exam:    Physical Exam  Vitals and nursing note reviewed.   Constitutional:       Appearance: She is well-developed.   HENT:      Head: Normocephalic.   Eyes:      Conjunctiva/sclera: Conjunctivae normal.      Pupils: Pupils are equal, round, and reactive to light.   Neck:      Thyroid: No thyromegaly.   Cardiovascular:      Rate and Rhythm: Normal rate and regular rhythm.      Heart sounds: Normal heart sounds.   Pulmonary:      Effort: Pulmonary effort is normal.      Breath sounds: Normal breath sounds.   Musculoskeletal:         General: Normal range of motion.      Cervical " back: Normal range of motion and neck supple.   Lymphadenopathy:      Cervical: No cervical adenopathy.   Neurological:      Mental Status: She is alert and oriented to person, place, and time.   Psychiatric:         Thought Content: Thought content normal.          ACE III MINI        Results Review:    None    CMP:  Lab Results   Component Value Date    BUN 17 08/15/2022    CREATININE 0.79 08/15/2022    EGFRIFNONA 80 12/30/2020    BCR 21.5 08/15/2022     08/15/2022    K 4.1 08/15/2022    CO2 24.5 08/15/2022    CALCIUM 9.2 08/15/2022    ALBUMIN 4.50 08/15/2022    BILITOT 0.4 08/15/2022    ALKPHOS 64 08/15/2022    AST 14 08/15/2022    ALT 13 08/15/2022     HbA1c:  No results found for: HGBA1C  Microalbumin:  Lab Results   Component Value Date    MICROALBUR <1.2 08/15/2022     Lipid Panel  Lab Results   Component Value Date    CHOL 205 (H) 08/15/2022    TRIG 144 08/15/2022    HDL 58 08/15/2022     (H) 08/15/2022    AST 14 08/15/2022    ALT 13 08/15/2022       Medication Review: Medications reviewed and noted  Patient Instructions   Problem List Items Addressed This Visit        Cardiac and Vasculature    Mixed hyperlipidemia    Overview     Taking 1/2 of the 5 mg rosuvastatin tablet 3 times a week.         Current Assessment & Plan     - She will increase the rosuvastatin to half tablet 4 times a week.  - Continue to improve low-fat, low-carbohydrate diet.         Relevant Medications    rosuvastatin (CRESTOR) 5 MG tablet    Mild essential hypertension    Overview     SBP got down to 50s on bisoprolol. Off it SBP has been 130 or less.         Current Assessment & Plan     - She will stay off of medication at this point.  - The patient will continue checking the blood pressure a couple times a week and write them down to bring to the next appointment.  - We want the systolic (the top number) to be less than 130 mmHg.  - She will try to decrease salt in the diet more.  - Increase exercise and walking.             Mental Health    Major depressive disorder with single episode, in partial remission (HCC)    Overview     Taking venlafaxine XR 75 mg daily.         Current Assessment & Plan     - She will continue current dose of venlafaxine daily.  - Increase walking and physical activity.         Relevant Medications    venlafaxine XR (EFFEXOR-XR) 75 MG 24 hr capsule    temazepam (RESTORIL) 7.5 MG capsule       Sleep    Primary insomnia - Primary    Overview     She has decreased  temazepam have the 15 mg capsule every evening.         Current Assessment & Plan     - The patient will continue the gradual decrease of the temazepam dose.  - She may add melatonin from 1 mg up to 15 mg every evening about an hour before bedtime to help with sleep.  - We discussed sleep hygiene including going to bed at the same time and getting up at the same time every day, going to bed early enough to get 7 or 8 hours in bed, reading and relaxing before bedtime, and avoiding the TV, computer, phone, iPad close to bedtime.         Relevant Medications    temazepam (RESTORIL) 7.5 MG capsule             Diagnosis Plan   1. Primary insomnia  temazepam (RESTORIL) 7.5 MG capsule   2. Mild essential hypertension     3. Mixed hyperlipidemia     4. Major depressive disorder with single episode, in partial remission (HCC)         Follow-up: 4 months        Plan of care reviewed with patient at the conclusion of today's visit. Education was provided regarding diagnosis, management, and any prescribed or recommended OTC medications.Patient verbalizes understanding of and agreement with management plan.         Yi Edouard MD      Transcribed from ambient dictation for Yi Edouard MD by Jany Vargas.  09/30/22   10:56 EDT    Patient verbalized consent to the visit recording.  I have personally performed the services described in this document as transcribed by the above individual, and it is both accurate and complete.

## 2022-10-05 ENCOUNTER — TELEPHONE (OUTPATIENT)
Dept: INTERNAL MEDICINE | Facility: CLINIC | Age: 72
End: 2022-10-05

## 2022-10-05 NOTE — TELEPHONE ENCOUNTER
PATIENT WANTS TO LET THE DOCTOR KNOW THAT SHE WANTS TO GO AHEAD AND GET THE PAXLOVID PRESCRIPTION.     [facility]/pharmacy #[id number] - [place], [place] - [address]  - [phone]  - [phone] FX     CALL BACK: [phone]

## 2022-10-06 NOTE — TELEPHONE ENCOUNTER
Caller: Jen Cook    Relationship: Self    Best call back number: [phone]    What is the best time to reach you: ANYTIME    Who are you requesting to speak with (clinical staff, provider,  specific staff member): CLINICAL STAFF    Do you know the name of the person who called: PATIENT     What was the call regarding:     PATIENT ADVISED THAT SHE TOOK ONE DOSE OF PAXLOVID AND IT CAUSED DIARRHEA.     PATIENT HAS SINCE STOPPED TAKING IT.    PATIENT WOULD LIKE CLINICAL ADVICE ON WHAT DO.     Do you require a callback: YES        DELETE AFTER READING TO PATIENT: “ Thank you for sharing this information with me. I will send a message to the . Please allow up to 48 hours for the  to follow up on this request.”

## 2022-10-06 NOTE — TELEPHONE ENCOUNTER
I had 2 other patients who had to stop it due to diarrhea.  So just treat the symptoms.  If she feels she is getting worse she should let us know, but I think she will do fine.

## 2022-10-08 ENCOUNTER — TELEMEDICINE (OUTPATIENT)
Dept: FAMILY MEDICINE CLINIC | Facility: TELEHEALTH | Age: 72
End: 2022-10-08

## 2022-10-08 DIAGNOSIS — U07.1 COVID-19: Primary | ICD-10-CM

## 2022-10-08 PROCEDURE — 99212 OFFICE O/P EST SF 10 MIN: CPT | Performed by: NURSE PRACTITIONER

## 2022-10-08 NOTE — PATIENT INSTRUCTIONS
COVID-19 symptoms can last for 10-14 days. Staying well hydrated and symptoms treatment is best. Try the over the counter throat lozenges with Benzocaine or menthol for sore throat. Stay on a clear liquid or bland diet as we discussed for the diarrhea. If you feel like you need further treatment, I will be here through Sunday and you can message me through your mychart and we can discuss other treatment options.     You will need to remain quarantined for 10 days from onset of symptoms and only to discontinue if symptoms have improved and no fever for 24 hours without the use of fever reducing medications such as Tylenol (acetaminophen), Advil (ibuprfen), or Aleve (naproxen).     You may discontinue after 5 days if your symptoms have resolved and you have no fever for 24 hours without the use of fever reducing medications such as Tylenol (acetaminophen), Advil (ibuprfen), or Aleve (naproxen).   Continue to wear a mask for 10 days or until symptoms resolve. If symptoms worsen, go to Urgent Care or the Emergency Department for care.     Symptoms of Coronavirus are treated just as you would for any viral illness. Take Tylenol and/or Ibuprofen for pain and/or fever, you may find it better to alternate these every 4 hours, so that you are only taking each every 8 hours. Stay hydrated, rest and you may treat cough with over-the-counter cough syrup such as Robitussin.  If your symptoms do not improve, symptoms change or do not fully resolve, seek further evaluation with your primary care provider or Urgent Care.     If you develop severe symptoms, such as chest pain, high fever, difficulty breathing, difficulty urinating, confusion, difficulty staying awake, blue or pale lips or have any symptoms that interfere with your ability to function go to the nearest Emergency Department immediately.

## 2022-10-08 NOTE — PROGRESS NOTES
CHIEF COMPLAINT  Chief Complaint   Patient presents with   • Sore Throat   • Diarrhea         HPI  Jen Cook is a 72 y.o. female  presents with complaint of COVID-19 and is day 5. She is taking Advil.   She was only able to take one dose of the Paxlovid and it caused severe diarrhea.     Review of Systems   Constitutional: Positive for fatigue and fever. Negative for chills and diaphoresis.   HENT: Positive for sore throat. Negative for congestion, postnasal drip, rhinorrhea, sinus pressure, sinus pain and sneezing.    Respiratory: Negative for cough, chest tightness, shortness of breath and wheezing.    Cardiovascular: Negative for chest pain.   Gastrointestinal: Positive for diarrhea. Negative for nausea and vomiting.   Musculoskeletal: Negative for myalgias.   Neurological: Negative for headaches.       Past Medical History:   Diagnosis Date   • Anxiety    • Benign essential hypertension with target blood pressure below 140/90    • Depression    • Generalized anxiety disorder with panic attacks    • Generalized anxiety disorder with panic attacks 1/7/2019   • Major depressive disorder with single episode    • Mixed hyperlipidemia    • Nutritional anemia 3/1/2019   • Post-menopause on HRT (hormone replacement therapy)    • Primary insomnia    • Rectal pain 4/2/2019   • Stroke (HCC) 2008    R hemispheric Stroke   • Vertigo    • Vitamin D deficiency        Family History   Adopted: Yes   Family history unknown: Yes       Social History     Socioeconomic History   • Marital status:    Tobacco Use   • Smoking status: Never   • Smokeless tobacco: Never   Vaping Use   • Vaping Use: Never used   Substance and Sexual Activity   • Alcohol use: Yes     Comment: socially    • Drug use: No   • Sexual activity: Defer       Jen Cook  reports that she has never smoked. She has never used smokeless tobacco..    There were no vitals taken for this visit.    PHYSICAL EXAM  Physical Exam   Constitutional: She is oriented  to person, place, and time. She appears well-developed and well-nourished. She does not have a sickly appearance. She does not appear ill. No distress.   HENT:   Head: Normocephalic and atraumatic.   Neck: Neck normal appearance.  Pulmonary/Chest: Effort normal.  No respiratory distress.  Neurological: She is alert and oriented to person, place, and time.   Skin: Skin is dry.   Psychiatric: She has a normal mood and affect.           Diagnoses and all orders for this visit:    1. COVID-19 (Primary)        The use of a video visit has been reviewed with the patient and verbal informd consent has een obtained. Myself and Jen Cook participated in this visit. The patient is located in [address] [place] [address]. I am located in [place], [place]. Mychart and Zoom were utilized.     Note Disclaimer: At [facility], we believe that sharing information builds trust and better   relationships. You are receiving this note because you recently visited [facility]. It is possible you   will see health information before a provider has talked with you about it. This kind of information can   be easy to misunderstand. To help you fully understand what it means for your health, we urge you to   discuss this note with your provider.    LLOYD Rivera  10/08/2022  10:31 EDT

## 2022-10-14 ENCOUNTER — OFFICE VISIT (OUTPATIENT)
Dept: INTERNAL MEDICINE | Facility: CLINIC | Age: 72
End: 2022-10-14

## 2022-10-14 VITALS
TEMPERATURE: 96.9 F | HEIGHT: 66 IN | HEART RATE: 68 BPM | BODY MASS INDEX: 22.98 KG/M2 | DIASTOLIC BLOOD PRESSURE: 78 MMHG | WEIGHT: 143 LBS | SYSTOLIC BLOOD PRESSURE: 114 MMHG

## 2022-10-14 DIAGNOSIS — B00.1 HERPES LABIALIS: ICD-10-CM

## 2022-10-14 DIAGNOSIS — B00.9 HERPES SIMPLEX DISEASE: Primary | ICD-10-CM

## 2022-10-14 PROCEDURE — 99213 OFFICE O/P EST LOW 20 MIN: CPT | Performed by: NURSE PRACTITIONER

## 2022-10-14 RX ORDER — ACYCLOVIR 400 MG/1
400 TABLET ORAL
Qty: 35 TABLET | Refills: 1 | Status: SHIPPED | OUTPATIENT
Start: 2022-10-14 | End: 2022-10-21

## 2022-10-14 RX ORDER — TEMAZEPAM 15 MG/1
7.5 CAPSULE ORAL NIGHTLY
COMMUNITY
End: 2022-11-07 | Stop reason: SDUPTHER

## 2022-10-14 NOTE — PROGRESS NOTES
"  Follow Up Office Visit      Patient Name: Jen Cook  : 1950   MRN: [id number]   Care Team: Patient Care Team:  Yi Edouard MD as PCP - General (Internal Medicine)  Pierre Carreno MD as Consulting Physician (Gastroenterology)  Víctor Tanner MD (Inactive) as Consulting Physician (Gynecology)  Leonel Ren MD as Consulting Physician (Orthopedic Surgery)    Chief Complaint:    Chief Complaint   Patient presents with   • Mouth Lesions       History of Present Illness: Jen Cook is a 72 y.o. female who presents today with upper lip right side blister lesion.    Patient states that she is 10 days out from COVID-19 infection.  She has returned to work and overall symptoms have resolved.   However, she has had a blister lesion on the right upper lip that is painful but also a visual disturbance.  States that she is continuing to wear her mask at work as she does not want to have this visible.    She states there is discomfort with trying to eat and drink fluids.  Notes that she has had \"fever blisters\" in the past but it has been many years ago.  Notes that she was given acyclovir then but has not taken in several years.    She has been applying Vaseline as needed.      Subjective      Review of Systems:   Review of Systems   Constitutional: Negative for chills and fever.   HENT: Positive for mouth sores. Negative for sore throat and trouble swallowing.    Respiratory: Negative for cough and shortness of breath.    Cardiovascular: Negative for chest pain, palpitations and leg swelling.       I have reviewed and the following portions of the patient's history were updated as appropriate: past family history, past medical history, past social history, past surgical history and problem list.    Medications:     Current Outpatient Medications:   •  clonazePAM (KlonoPIN) 0.5 MG tablet, Take 1 tablet by mouth At Night As Needed for Anxiety., Disp: 30 tablet, Rfl: 2  •  estradiol " "(CLIMARA) 0.0375 MG/24HR, PLACE 1 PATCH ON THE SKIN AS DIRECTED BY PROVIDER 1 (ONE) TIME PER WEEK., Disp: 4 patch, Rfl: 5  •  famotidine (PEPCID) 20 MG tablet, Take 1 tablet by mouth 2 (Two) Times a Day., Disp: 180 tablet, Rfl: 1  •  fluconazole (DIFLUCAN) 150 MG tablet, Take 1 tablet by mouth Daily As Needed (yeast vaginitis)., Disp: 3 tablet, Rfl: 0  •  rosuvastatin (CRESTOR) 5 MG tablet, Take 0.5 tablets by mouth Every Night. (Patient taking differently: Take 2.5 mg by mouth 3 (Three) Times a Week.), Disp: 46 tablet, Rfl: 1  •  temazepam (RESTORIL) 15 MG capsule, Take 7.5 mg by mouth Every Night., Disp: , Rfl:   •  venlafaxine XR (EFFEXOR-XR) 75 MG 24 hr capsule, Take 1 capsule by mouth Every Morning., Disp: 90 capsule, Rfl: 1  •  acyclovir (Zovirax) 400 MG tablet, Take 1 tablet by mouth Every 4 (Four) Hours While Awake for 7 days. Take no more than 5 doses a day., Disp: 35 tablet, Rfl: 1    Allergies:   No Known Allergies    Objective     Physical Exam:  Vital Signs:   Vitals:    10/14/22 1051   BP: 114/78   BP Location: Left arm   Patient Position: Sitting   Cuff Size: Adult   Pulse: 68   Temp: 96.9 °F (36.1 °C)   TempSrc: Temporal   Weight: 64.9 kg (143 lb)   Height: 166.4 cm (65.52\")   PainSc: 0-No pain     Body mass index is 23.42 kg/m².     Physical Exam  Vitals and nursing note reviewed.   Constitutional:       General: She is not in acute distress.  HENT:      Head: Normocephalic.      Right Ear: Tympanic membrane, ear canal and external ear normal.      Left Ear: Tympanic membrane, ear canal and external ear normal.      Mouth/Throat:      Comments: Right upper lip with erythema and blister lesion noted  Eyes:      General: No scleral icterus.     Conjunctiva/sclera: Conjunctivae normal.   Cardiovascular:      Rate and Rhythm: Normal rate.   Pulmonary:      Effort: Pulmonary effort is normal. No respiratory distress.   Musculoskeletal:      Cervical back: Neck supple. No tenderness.   Lymphadenopathy:    "   Cervical: No cervical adenopathy.   Neurological:      Mental Status: She is alert.   Psychiatric:         Mood and Affect: Mood normal.         Thought Content: Thought content normal.         Judgment: Judgment normal.         Assessment / Plan      Assessment/Plan:   Problems Addressed This Visit    ICD-10-CM ICD-9-CM   1. Herpes simplex disease  B00.9 054.9   2. Herpes labialis  B00.1 054.9      Herpes simplex virus  Herpes labialis, right upper lip  -Acyclovir 400 mg every 4 hours while awake x7 days  -May also use OTC Abreva as needed  -Avoid sun exposure    Plan of care reviewed with patient at the conclusion of today's visit. Education was provided regarding diagnosis and management.  Patient verbalizes understanding of and agreement with management plan.      Follow Up:   Return for Next scheduled follow up.        LLOYD Cadet  [facility] [address] [facility]    Please note that portions of this note were completed with a voice recognition program.

## 2022-11-07 ENCOUNTER — FLU SHOT (OUTPATIENT)
Dept: INTERNAL MEDICINE | Facility: CLINIC | Age: 72
End: 2022-11-07

## 2022-11-07 DIAGNOSIS — Z23 NEED FOR INFLUENZA VACCINATION: Primary | ICD-10-CM

## 2022-11-07 DIAGNOSIS — F51.01 PRIMARY INSOMNIA: Primary | ICD-10-CM

## 2022-11-07 PROCEDURE — 90662 IIV NO PRSV INCREASED AG IM: CPT | Performed by: INTERNAL MEDICINE

## 2022-11-07 PROCEDURE — G0008 ADMIN INFLUENZA VIRUS VAC: HCPCS | Performed by: INTERNAL MEDICINE

## 2022-11-07 RX ORDER — VENLAFAXINE HYDROCHLORIDE 75 MG/1
75 CAPSULE, EXTENDED RELEASE ORAL EVERY MORNING
Qty: 90 CAPSULE | Refills: 1 | Status: SHIPPED | OUTPATIENT
Start: 2022-11-07 | End: 2022-12-29 | Stop reason: SDUPTHER

## 2022-11-07 RX ORDER — TEMAZEPAM 15 MG/1
15 CAPSULE ORAL NIGHTLY PRN
Qty: 30 CAPSULE | Refills: 2 | Status: SHIPPED | OUTPATIENT
Start: 2022-11-07 | End: 2022-11-08

## 2022-11-07 NOTE — TELEPHONE ENCOUNTER
Rx Refill Note  Requested Prescriptions     Pending Prescriptions Disp Refills   • temazepam (RESTORIL) 15 MG capsule       Sig: Take 1 capsule by mouth Every Night.      Last office visit with prescribing clinician: 10/14/22  Next office visit with prescribing clinician: 01/30/23    LA: not sent in epic              Rosalind Rebolledo LPN  11/07/22, 09:18 EST

## 2022-11-08 DIAGNOSIS — F51.01 PRIMARY INSOMNIA: Primary | ICD-10-CM

## 2022-11-08 RX ORDER — TEMAZEPAM 7.5 MG/1
7.5 CAPSULE ORAL NIGHTLY PRN
Qty: 30 CAPSULE | Refills: 2 | Status: SHIPPED | OUTPATIENT
Start: 2022-11-08 | End: 2022-12-08 | Stop reason: SDUPTHER

## 2022-12-08 DIAGNOSIS — F51.01 PRIMARY INSOMNIA: ICD-10-CM

## 2022-12-08 RX ORDER — TEMAZEPAM 7.5 MG/1
7.5 CAPSULE ORAL NIGHTLY PRN
Qty: 30 CAPSULE | Refills: 2 | Status: SHIPPED | OUTPATIENT
Start: 2022-12-08 | End: 2023-03-06 | Stop reason: SDUPTHER

## 2022-12-08 NOTE — TELEPHONE ENCOUNTER
Rx Refill Note  Requested Prescriptions     Pending Prescriptions Disp Refills   • temazepam (RESTORIL) 7.5 MG capsule 30 capsule 2     Sig: Take 1 capsule by mouth At Night As Needed for Sleep.      Last office visit with prescribing clinician: 9/30/2022   Last telemedicine visit with prescribing clinician: 1/24/2023   Next office visit with prescribing clinician: 1/24/2023                         Would you like a call back once the refill request has been completed: [] Yes [] No    If the office needs to give you a call back, can they leave a voicemail: [] Yes [] No    Shauna Mo LPN  12/08/22, 10:15 EST

## 2022-12-29 RX ORDER — VENLAFAXINE HYDROCHLORIDE 75 MG/1
75 CAPSULE, EXTENDED RELEASE ORAL EVERY MORNING
Qty: 90 CAPSULE | Refills: 1 | Status: SHIPPED | OUTPATIENT
Start: 2022-12-29

## 2022-12-29 NOTE — TELEPHONE ENCOUNTER
Rx Refill Note  Requested Prescriptions     Pending Prescriptions Disp Refills   • venlafaxine XR (EFFEXOR-XR) 75 MG 24 hr capsule 90 capsule 1     Sig: Take 1 capsule by mouth Every Morning.      Last office visit with prescribing clinician: 9/30/2022   Last telemedicine visit with prescribing clinician: 1/24/2023   Next office visit with prescribing clinician: 1/24/2023                         Would you like a call back once the refill request has been completed: [] Yes [] No    If the office needs to give you a call back, can they leave a voicemail: [] Yes [] No    Pavan Ovalle MA  12/29/22, 16:00 EST

## 2023-01-03 DIAGNOSIS — F41.1 GENERALIZED ANXIETY DISORDER: ICD-10-CM

## 2023-01-03 RX ORDER — CLONAZEPAM 0.5 MG/1
0.5 TABLET ORAL NIGHTLY PRN
Qty: 30 TABLET | Refills: 2 | Status: SHIPPED | OUTPATIENT
Start: 2023-01-03 | End: 2023-04-03 | Stop reason: SDUPTHER

## 2023-01-16 RX ORDER — ESTRADIOL 0.04 MG/D
1 PATCH TRANSDERMAL WEEKLY
Qty: 4 PATCH | Refills: 5 | Status: SHIPPED | OUTPATIENT
Start: 2023-01-16

## 2023-01-16 NOTE — TELEPHONE ENCOUNTER
Rx Refill Note  Requested Prescriptions     Pending Prescriptions Disp Refills   • estradiol (CLIMARA) 0.0375 MG/24HR [Pharmacy Med Name: ESTRADIOL 0.0375MG PATCH(1/WK)] 4 patch 5     Sig: PLACE 1 PATCH ON THE SKIN AS DIRECTED BY PROVIDER 1 (ONE) TIME PER WEEK.      Last office visit with prescribing clinician: Visit date not found   Last telemedicine visit with prescribing clinician: 1/17/2023   Next office visit with prescribing clinician: Visit date not found                             Jeanine Mckay RN  01/16/23, 13:06 EST

## 2023-01-17 ENCOUNTER — LAB (OUTPATIENT)
Dept: LAB | Facility: HOSPITAL | Age: 73
End: 2023-01-17
Payer: MEDICARE

## 2023-01-17 ENCOUNTER — HOSPITAL ENCOUNTER (OUTPATIENT)
Dept: ULTRASOUND IMAGING | Facility: HOSPITAL | Age: 73
Discharge: HOME | End: 2023-01-17
Payer: MEDICARE

## 2023-01-17 ENCOUNTER — OFFICE VISIT (OUTPATIENT)
Dept: INTERNAL MEDICINE | Facility: CLINIC | Age: 73
End: 2023-01-17
Payer: MEDICARE

## 2023-01-17 ENCOUNTER — TELEPHONE (OUTPATIENT)
Dept: INTERNAL MEDICINE | Facility: CLINIC | Age: 73
End: 2023-01-17

## 2023-01-17 VITALS
WEIGHT: 142.6 LBS | BODY MASS INDEX: 24.34 KG/M2 | SYSTOLIC BLOOD PRESSURE: 124 MMHG | DIASTOLIC BLOOD PRESSURE: 80 MMHG | TEMPERATURE: 98.4 F | HEART RATE: 74 BPM | HEIGHT: 64 IN

## 2023-01-17 DIAGNOSIS — R11.14 BILIOUS VOMITING WITH NAUSEA: ICD-10-CM

## 2023-01-17 DIAGNOSIS — R10.13 MIDEPIGASTRIC PAIN: Primary | Chronic | ICD-10-CM

## 2023-01-17 DIAGNOSIS — I10 MILD ESSENTIAL HYPERTENSION: ICD-10-CM

## 2023-01-17 DIAGNOSIS — R51.9 ACUTE NONINTRACTABLE HEADACHE, UNSPECIFIED HEADACHE TYPE: Chronic | ICD-10-CM

## 2023-01-17 DIAGNOSIS — E78.2 MIXED HYPERLIPIDEMIA: ICD-10-CM

## 2023-01-17 DIAGNOSIS — R10.13 MIDEPIGASTRIC PAIN: Chronic | ICD-10-CM

## 2023-01-17 DIAGNOSIS — R10.13 MIDEPIGASTRIC PAIN: ICD-10-CM

## 2023-01-17 DIAGNOSIS — K21.9 GASTROESOPHAGEAL REFLUX DISEASE WITHOUT ESOPHAGITIS: Chronic | ICD-10-CM

## 2023-01-17 LAB
ALBUMIN SERPL-MCNC: 4.4 G/DL (ref 3.5–5.2)
ALBUMIN/GLOB SERPL: 1.3 G/DL
ALP SERPL-CCNC: 62 U/L (ref 39–117)
ALT SERPL W P-5'-P-CCNC: 9 U/L (ref 1–33)
AMYLASE SERPL-CCNC: 81 U/L (ref 28–100)
ANION GAP SERPL CALCULATED.3IONS-SCNC: 12.3 MMOL/L (ref 5–15)
AST SERPL-CCNC: 16 U/L (ref 1–32)
BASOPHILS # BLD AUTO: 0.09 10*3/MM3 (ref 0–0.2)
BASOPHILS NFR BLD AUTO: 1.1 % (ref 0–1.5)
BILIRUB SERPL-MCNC: 0.3 MG/DL (ref 0–1.2)
BUN SERPL-MCNC: 23 MG/DL (ref 8–23)
BUN/CREAT SERPL: 27.4 (ref 7–25)
CALCIUM SPEC-SCNC: 9.3 MG/DL (ref 8.6–10.5)
CHLORIDE SERPL-SCNC: 102 MMOL/L (ref 98–107)
CHOLEST SERPL-MCNC: 211 MG/DL (ref 0–200)
CO2 SERPL-SCNC: 24.7 MMOL/L (ref 22–29)
CREAT SERPL-MCNC: 0.84 MG/DL (ref 0.57–1)
DEPRECATED RDW RBC AUTO: 41.1 FL (ref 37–54)
EGFRCR SERPLBLD CKD-EPI 2021: 73.5 ML/MIN/1.73
EOSINOPHIL # BLD AUTO: 0.27 10*3/MM3 (ref 0–0.4)
EOSINOPHIL NFR BLD AUTO: 3.4 % (ref 0.3–6.2)
ERYTHROCYTE [DISTWIDTH] IN BLOOD BY AUTOMATED COUNT: 13.4 % (ref 12.3–15.4)
GLOBULIN UR ELPH-MCNC: 3.3 GM/DL
GLUCOSE SERPL-MCNC: 89 MG/DL (ref 65–99)
HCT VFR BLD AUTO: 42.1 % (ref 34–46.6)
HDLC SERPL-MCNC: 57 MG/DL (ref 40–60)
HGB BLD-MCNC: 14.4 G/DL (ref 12–15.9)
IMM GRANULOCYTES # BLD AUTO: 0.03 10*3/MM3 (ref 0–0.05)
IMM GRANULOCYTES NFR BLD AUTO: 0.4 % (ref 0–0.5)
LDLC SERPL CALC-MCNC: 114 MG/DL (ref 0–100)
LDLC/HDLC SERPL: 1.89 {RATIO}
LIPASE SERPL-CCNC: 28 U/L (ref 13–60)
LYMPHOCYTES # BLD AUTO: 2.7 10*3/MM3 (ref 0.7–3.1)
LYMPHOCYTES NFR BLD AUTO: 33.9 % (ref 19.6–45.3)
MCH RBC QN AUTO: 28.7 PG (ref 26.6–33)
MCHC RBC AUTO-ENTMCNC: 34.2 G/DL (ref 31.5–35.7)
MCV RBC AUTO: 84 FL (ref 79–97)
MONOCYTES # BLD AUTO: 0.72 10*3/MM3 (ref 0.1–0.9)
MONOCYTES NFR BLD AUTO: 9 % (ref 5–12)
NEUTROPHILS NFR BLD AUTO: 4.15 10*3/MM3 (ref 1.7–7)
NEUTROPHILS NFR BLD AUTO: 52.2 % (ref 42.7–76)
NRBC BLD AUTO-RTO: 0 /100 WBC (ref 0–0.2)
PLATELET # BLD AUTO: 355 10*3/MM3 (ref 140–450)
PMV BLD AUTO: 10 FL (ref 6–12)
POTASSIUM SERPL-SCNC: 4.6 MMOL/L (ref 3.5–5.2)
PROT SERPL-MCNC: 7.7 G/DL (ref 6–8.5)
RBC # BLD AUTO: 5.01 10*6/MM3 (ref 3.77–5.28)
SODIUM SERPL-SCNC: 139 MMOL/L (ref 136–145)
TRIGL SERPL-MCNC: 230 MG/DL (ref 0–150)
VLDLC SERPL-MCNC: 40 MG/DL (ref 5–40)
WBC NRBC COR # BLD: 7.96 10*3/MM3 (ref 3.4–10.8)

## 2023-01-17 PROCEDURE — 82150 ASSAY OF AMYLASE: CPT

## 2023-01-17 PROCEDURE — 80061 LIPID PANEL: CPT

## 2023-01-17 PROCEDURE — 85025 COMPLETE CBC W/AUTO DIFF WBC: CPT

## 2023-01-17 PROCEDURE — 80053 COMPREHEN METABOLIC PANEL: CPT

## 2023-01-17 PROCEDURE — 83690 ASSAY OF LIPASE: CPT

## 2023-01-17 PROCEDURE — 76705 ECHO EXAM OF ABDOMEN: CPT

## 2023-01-17 PROCEDURE — 99214 OFFICE O/P EST MOD 30 MIN: CPT | Performed by: INTERNAL MEDICINE

## 2023-01-17 RX ORDER — LOSARTAN POTASSIUM 25 MG/1
25 TABLET ORAL DAILY
Qty: 30 TABLET | Refills: 5 | Status: SHIPPED | OUTPATIENT
Start: 2023-01-17

## 2023-01-17 RX ORDER — FAMOTIDINE 20 MG/1
TABLET, FILM COATED ORAL
Qty: 195 TABLET | Refills: 1 | Status: SHIPPED | OUTPATIENT
Start: 2023-01-17

## 2023-01-17 NOTE — ASSESSMENT & PLAN NOTE
Probably secondary to tension toward the end of the workday. She may continue to take Tylenol or an occasional Advil.

## 2023-01-17 NOTE — TELEPHONE ENCOUNTER
----- Message from Yi Edouard MD sent at 1/17/2023  1:59 PM EST -----  Please call patient if this has not been read by end of day       I am glad to report that the ultrasound of the gallbladder was normal.  Bile duct and pancreas and kidney also appear normal.  Hopefully this was a one-time occurrence.  If you do have more symptoms, let me know.

## 2023-01-17 NOTE — ASSESSMENT & PLAN NOTE
We will start losartan 25 mg tablet daily. Continue to avoid salt in the diet. Continue trying to get some regular walking and exercise. She is going to record her blood pressure values from home and bring it to her next visit in 3 weeks.

## 2023-01-17 NOTE — PROGRESS NOTES
"[facility]     Jen SANAM May  1950   [id number]      Patient Care Team:  Yi Edouard MD as PCP - General (Internal Medicine)  Pierre Carreno MD as Consulting Physician (Gastroenterology)  Víctor Tanner MD (Inactive) as Consulting Physician (Gynecology)  Leonel Ren MD as Consulting Physician (Orthopedic Surgery)    Chief Complaint   Patient presents with   • Abdominal Pain     Epigastric pain, Saturday, last a few hours, almost went to ER, vomited green bile            HPI  Patient is a 72 y.o. female who presents today for an acute visit for upper abdominal pain, mid epigastric.    Upper abdominal pain, mid epigastric.  The patient reports experiencing upper abdominal pain, mid epigastric pain, and diaphoresis on 01/14/2023 at approximately 3 AM. She describes 2 episodes of green bile coming up in her mouth. The pain was a cramping-type pain. She denies experiencing any discomfort on 01/13/2023. The patient reports eating spaghetti and a handful of potato chips for dinner around 6 PM on 01/13/2023. She ate a [employer] for lunch on 01/13/2023. The patient denies experiencing heartburn or indigestion through last week. She takes famotidine once daily, which works well for her. The patient reports experiencing \"rumbling\" in her stomach, which has worsened over the last couple of days. Her stomach may have been bloated a little. She denies experiencing gas, diarrhea, nausea, fever, or chills. The patient vomited, which helped to relieve her symptoms. She reports eating bland food around 6 PM on 01/14/2023, which went well. The patient denies experiencing any pain afterwards. She reports experiencing mild constipation. The patient takes Calm Magnesium for constipation, which usually helps soften her stool. She denies experiencing hematochezia. The patient still has her gallbladder. She denied sore throat, sinus drainage, or cough, but did have a headache. "     Hyperlipidemia.  The patient is taking rosuvastatin a 0.5 tablet 3 times per week. She tolerates the medication well. In 08/2022 her cholesterol had come down from 187 mg/dL to 122 mg/dL. Triglycerides had come down to normal.     Hypertension.  The patient is not currently taking any medication for her blood pressure. She was previously taking bisoprolol, but her heart rate was too low. Today, her heart rate is 74 beats per minute. Her blood pressure is elevated today at 146/98 mmHg. The patient reports monitoring her blood pressure at home, which averages 145 to 146 mmHg systolic and 70s mmHg diastolic. She checks her blood pressure at night when she is relaxed. The patient's blood pressure on recheck today is 124/80 mmHg. She is agreeable to trying losartan 25 mg once daily and will keep a blood pressure log.    GERD  She reports she can feel the reflux or food still in her chest area. The patient will try taking famotidine 3 times per day for 2 few weeks and then return to twice daily.     Headaches.  The patient reports experiencing headaches for the past couple of weeks. She rarely experiences headaches. The patient laid down and turned all the lights off, which relieved her headaches. She took an Advil that night. The patient reports work being stressful.              CHRONIC CONDITIONS      Past Medical History:   Diagnosis Date   • Anxiety    • Benign essential hypertension with target blood pressure below 140/90    • Depression    • Generalized anxiety disorder with panic attacks    • Generalized anxiety disorder with panic attacks 1/7/2019   • Major depressive disorder with single episode    • Mixed hyperlipidemia    • Nutritional anemia 3/1/2019   • Post-menopause on HRT (hormone replacement therapy)    • Primary insomnia    • Rectal pain 4/2/2019   • Stroke (HCC) 2008    R hemispheric Stroke   • Vertigo    • Vitamin D deficiency        Past Surgical History:   Procedure Laterality Date   •  "AUGMENTATION MAMMAPLASTY  1990   • BREAST AUGMENTATION Bilateral 1989   • LAPAROSCOPIC ASSISTED VAGINAL HYSTERECTOMY SALPINGO OOPHORECTOMY Bilateral    • LAPAROSCOPIC HERNANDEZ PROCEDURE     • PARTIAL KNEE ARTHROPLASTY  2015    Partial knee replacement 2 years ago       Family History   Adopted: Yes   Family history unknown: Yes       Social History     Socioeconomic History   • Marital status:    Tobacco Use   • Smoking status: Never   • Smokeless tobacco: Never   Vaping Use   • Vaping Use: Never used   Substance and Sexual Activity   • Alcohol use: Yes     Comment: socially    • Drug use: No   • Sexual activity: Defer       No Known Allergies      Vital Signs  Vitals:    01/17/23 0814 01/17/23 0837   BP: 146/98 124/80   BP Location: Left arm    Patient Position: Sitting    Cuff Size: Adult    Pulse: 74    Temp: 98.4 °F (36.9 °C)    TempSrc: Infrared    Weight: 64.7 kg (142 lb 9.6 oz)    Height: 162.6 cm (64\")    PainSc: 0-No pain      Body mass index is 24.48 kg/m².  BMI is within normal parameters. No other follow-up for BMI required.        Current Outpatient Medications:   •  clonazePAM (KlonoPIN) 0.5 MG tablet, Take 1 tablet by mouth At Night As Needed for Anxiety., Disp: 30 tablet, Rfl: 2  •  estradiol (CLIMARA) 0.0375 MG/24HR, PLACE 1 PATCH ON THE SKIN AS DIRECTED BY PROVIDER 1 (ONE) TIME PER WEEK., Disp: 4 patch, Rfl: 5  •  famotidine (PEPCID) 20 MG tablet, Take 1 tab 3 times a day for 2 wks then twice a day ongoing, Disp: 195 tablet, Rfl: 1  •  rosuvastatin (CRESTOR) 5 MG tablet, Take 0.5 tablets by mouth Every Night. (Patient taking differently: Take 2.5 mg by mouth 3 (Three) Times a Week.), Disp: 46 tablet, Rfl: 1  •  temazepam (RESTORIL) 7.5 MG capsule, Take 1 capsule by mouth At Night As Needed for Sleep., Disp: 30 capsule, Rfl: 2  •  venlafaxine XR (EFFEXOR-XR) 75 MG 24 hr capsule, Take 1 capsule by mouth Every Morning., Disp: 90 capsule, Rfl: 1  •  losartan (COZAAR) 25 MG tablet, Take 1 tablet by " mouth Daily., Disp: 30 tablet, Rfl: 5    Physical Exam:    Physical Exam  Vitals and nursing note reviewed.   Constitutional:       Appearance: She is well-developed.   HENT:      Head: Normocephalic.   Eyes:      Conjunctiva/sclera: Conjunctivae normal.      Pupils: Pupils are equal, round, and reactive to light.   Neck:      Thyroid: No thyromegaly.   Cardiovascular:      Rate and Rhythm: Normal rate and regular rhythm.      Heart sounds: Normal heart sounds.   Pulmonary:      Effort: Pulmonary effort is normal.      Breath sounds: Normal breath sounds.   Abdominal:      Comments: Abdominal exam is all normal. No hepatosplenomegaly, no tenderness, no hernia, normal bowel sounds.   Musculoskeletal:         General: Normal range of motion.      Cervical back: Normal range of motion and neck supple.   Lymphadenopathy:      Cervical: No cervical adenopathy.   Neurological:      Mental Status: She is alert and oriented to person, place, and time.   Psychiatric:         Thought Content: Thought content normal.          ACE III MINI        Results Review:    None    CMP:  Lab Results   Component Value Date    BUN 17 08/15/2022    CREATININE 0.79 08/15/2022    EGFRIFNONA 80 12/30/2020    BCR 21.5 08/15/2022     08/15/2022    K 4.1 08/15/2022    CO2 24.5 08/15/2022    CALCIUM 9.2 08/15/2022    ALBUMIN 4.50 08/15/2022    BILITOT 0.4 08/15/2022    ALKPHOS 64 08/15/2022    AST 14 08/15/2022    ALT 13 08/15/2022     HbA1c:  No results found for: HGBA1C  Microalbumin:  Lab Results   Component Value Date    MICROALBUR <1.2 08/15/2022     Lipid Panel  Lab Results   Component Value Date    CHOL 205 (H) 08/15/2022    TRIG 144 08/15/2022    HDL 58 08/15/2022     (H) 08/15/2022    AST 14 08/15/2022    ALT 13 08/15/2022       Medication Review: Medications reviewed and noted  Patient Instructions   Problem List Items Addressed This Visit        Cardiac and Vasculature    Mixed hyperlipidemia    Overview     Taking 1/2 of  the 5 mg rosuvastatin tablet 3 times a week.         Current Assessment & Plan     Continue current dose of rosuvastatin. Recheck lipids today.         Relevant Medications    rosuvastatin (CRESTOR) 5 MG tablet    Other Relevant Orders    Lipid Panel    Mild essential hypertension    Overview     SBP got down to 50s on bisoprolol. Off it SBP has been 130 or less.         Current Assessment & Plan     We will start losartan 25 mg tablet daily. Continue to avoid salt in the diet. Continue trying to get some regular walking and exercise. She is going to record her blood pressure values from home and bring it to her next visit in 3 weeks.         Relevant Medications    losartan (COZAAR) 25 MG tablet       Gastrointestinal Abdominal     Gastroesophageal reflux disease without esophagitis (Chronic)    Overview     Taking 20 mg famotidine once a day.               Current Assessment & Plan     Increase famotidine to 3 times per day for the next 2 weeks, then twice per day ongoing. Continue to avoid eating close to bedtime and avoid large meals. Continue to avoid fatty meals.         Relevant Medications    famotidine (PEPCID) 20 MG tablet    Midepigastric pain - Primary (Chronic)    Current Assessment & Plan     Ultrasound of the gallbladder. Check liver enzymes, blood cell counts, and pancreatic enzymes.         Relevant Orders    US Gallbladder (Completed)    Comprehensive Metabolic Panel    CBC & Differential    Amylase    Lipase    Bilious vomiting with nausea    Current Assessment & Plan     Ultrasound of the gallbladder. Check liver enzymes, blood cell counts, and pancreatic enzymes.         Relevant Orders    US Gallbladder (Completed)       Neuro    Acute nonintractable headache (Chronic)    Current Assessment & Plan     Probably secondary to tension toward the end of the workday. She may continue to take Tylenol or an occasional Advil.               Diagnosis Plan   1. Midepigastric pain  US Gallbladder     Comprehensive Metabolic Panel    CBC & Differential    Amylase    Lipase      2. Bilious vomiting with nausea  US Gallbladder      3. Gastroesophageal reflux disease without esophagitis        4. Mild essential hypertension        5. Acute nonintractable headache, unspecified headache type        6. Mixed hyperlipidemia  Lipid Panel        Follow-up: She will follow up in 3 weeks to check her blood pressure.         Plan of care reviewed with patient at the conclusion of today's visit. Education was provided regarding diagnosis, management, and any prescribed or recommended OTC medications.Patient verbalizes understanding of and agreement with management plan.         Yi Edouard MD      Transcribed from ambient dictation for Yi Edouard MD by Jany Vargas.  01/17/23   09:26 EST    Patient or patient representative verbalized consent to the visit recording.  I have personally performed the services described in this document as transcribed by the above individual, and it is both accurate and complete.

## 2023-01-17 NOTE — ASSESSMENT & PLAN NOTE
Increase famotidine to 3 times per day for the next 2 weeks, then twice per day ongoing. Continue to avoid eating close to bedtime and avoid large meals. Continue to avoid fatty meals.

## 2023-02-13 ENCOUNTER — OFFICE VISIT (OUTPATIENT)
Dept: INTERNAL MEDICINE | Facility: CLINIC | Age: 73
End: 2023-02-13
Payer: MEDICARE

## 2023-02-13 VITALS
DIASTOLIC BLOOD PRESSURE: 86 MMHG | HEIGHT: 65 IN | OXYGEN SATURATION: 99 % | WEIGHT: 139.4 LBS | SYSTOLIC BLOOD PRESSURE: 136 MMHG | BODY MASS INDEX: 23.22 KG/M2 | HEART RATE: 70 BPM | TEMPERATURE: 98.4 F

## 2023-02-13 DIAGNOSIS — F32.4 MAJOR DEPRESSIVE DISORDER WITH SINGLE EPISODE, IN PARTIAL REMISSION: ICD-10-CM

## 2023-02-13 DIAGNOSIS — F41.1 GENERALIZED ANXIETY DISORDER: ICD-10-CM

## 2023-02-13 DIAGNOSIS — I10 MILD ESSENTIAL HYPERTENSION: ICD-10-CM

## 2023-02-13 DIAGNOSIS — K21.9 GASTROESOPHAGEAL REFLUX DISEASE WITHOUT ESOPHAGITIS: Chronic | ICD-10-CM

## 2023-02-13 DIAGNOSIS — F51.01 PRIMARY INSOMNIA: ICD-10-CM

## 2023-02-13 DIAGNOSIS — Z00.00 MEDICARE ANNUAL WELLNESS VISIT, SUBSEQUENT: Primary | ICD-10-CM

## 2023-02-13 DIAGNOSIS — E78.2 MIXED HYPERLIPIDEMIA: ICD-10-CM

## 2023-02-13 DIAGNOSIS — M85.89 OSTEOPENIA OF MULTIPLE SITES: Chronic | ICD-10-CM

## 2023-02-13 DIAGNOSIS — N81.11 MIDLINE CYSTOCELE: ICD-10-CM

## 2023-02-13 PROBLEM — I95.1 ORTHOSTATIC HYPOTENSION: Status: RESOLVED | Noted: 2022-01-03 | Resolved: 2023-02-13

## 2023-02-13 PROCEDURE — 99214 OFFICE O/P EST MOD 30 MIN: CPT | Performed by: INTERNAL MEDICINE

## 2023-02-13 PROCEDURE — G0439 PPPS, SUBSEQ VISIT: HCPCS | Performed by: INTERNAL MEDICINE

## 2023-02-13 PROCEDURE — 93000 ELECTROCARDIOGRAM COMPLETE: CPT | Performed by: INTERNAL MEDICINE

## 2023-02-13 NOTE — ASSESSMENT & PLAN NOTE
She will continue taking 0.5 tablet of rosuvastatin and increase it to 4 nights a week, then to 5 nights a week and gradually up to 7 nights per week. Continue low-fat, low-sugar diet. Increase walking and physical activity.

## 2023-02-13 NOTE — ASSESSMENT & PLAN NOTE
She will try to walk more and use small hand weights at home more often. May also use exercise bands for resistance exercises.

## 2023-02-13 NOTE — PROGRESS NOTES
The ABCs of the Annual Wellness Visit  Initial Medicare Wellness Visit    Chief Complaint   Patient presents with   • Medicare Wellness-subsequent   • Hypertension       Subjective   History of Present Illness:  Jen Cook is a 72 y.o. female who presents for an Initial Medicare Wellness Visit.    Health maintenance  The patient denies any recent falls. She saw her eye doctor in 11/2022; she denies any problems. She is up to date on her colonoscopy, mammogram, and DEXA scan. She had an ultrasound in 07/2022 that was normal.    Hypertension  Her blood pressure today is 136/86 mmHg. She monitors her blood pressure at home and reports readings of 125/72 mmHg. She is taking losartan 25 mg once a day.    Hyperlipidemia  She is taking 0.5 tablet of rosuvastatin 3 times a week and is tolerating it well. In 01/2023, her LDL was 114; it was 187 in 05/2022. Her triglycerides were elevated at 230 mg/dL. She is following a low-fat, low-sugar diet. She has lost 6 pounds since her last visit. She denies exercising.     Abdominal pain  She denies any nausea or abdominal pain.    GERD  She denies heartburn and indigestion.    Joint pain  She denies any joint pain.    Menopause  She is using the estradiol patch. She denies any vaginal irritation or dryness.    Cystocele  She mentions that she has a cystocele, but it is not bad enough to address surgically.    Insomnia  She is taking temazepam 7.5 mg in the evenings. She is getting 7 hours of sleep a night. She has not tried skipping a dose. She goes to bed at 9:00 PM, reads for an hour, and then lays down.    Anxiety and depression  She is taking venlafaxine daily and clonazepam 0.5 mg every night.    Immunizations  She is due for bivalent COVID-19 and Shingrix vaccines. She is up to date on all other vaccinations.    Labs 01/2023  LDL- 114.  Triglycerides- 230.  CBC- WNL.  LFTs- WNL.  Creatinine- WNL.  Blood glucose- WNL.       CHRONIC CONDITIONS:    HEALTH RISK  ASSESSMENT    Recent Hospitalizations:  She was not admitted to the hospital during the last year.       Current Medical Providers:  Patient Care Team:  Yi Edouard MD as PCP - General (Internal Medicine)  Pierre Carreno MD as Consulting Physician (Gastroenterology)  Víctor Tanner MD (Inactive) as Consulting Physician (Gynecology)  Leonel Ren MD as Consulting Physician (Orthopedic Surgery)    Smoking Status:  Social History     Tobacco Use   Smoking Status Never   Smokeless Tobacco Never       Alcohol Consumption:  Social History     Substance and Sexual Activity   Alcohol Use Yes    Comment: socially        Depression Screen:   PHQ-2/PHQ-9 Depression Screening 2/13/2023   Retired PHQ-9 Total Score -   Retired Total Score -   Little Interest or Pleasure in Doing Things 0-->not at all   Feeling Down, Depressed or Hopeless 0-->not at all   PHQ-9: Brief Depression Severity Measure Score 0       Fall Risk Screen:  PAUL Fall Risk Assessment was completed, and patient is at LOW risk for falls.Assessment completed on:2/13/2023    Health Habits and Functional and Cognitive Screening:  Functional & Cognitive Status 1/3/2022   Do you have difficulty preparing food and eating? No   Do you have difficulty bathing yourself, getting dressed or grooming yourself? No   Do you have difficulty using the toilet? No   Do you have difficulty moving around from place to place? No   Do you have trouble with steps or getting out of a bed or a chair? No   Current Diet Well Balanced Diet   Dental Exam Up to date   Eye Exam Up to date   Exercise (times per week) 0 times per week   Current Exercises Include No Regular Exercise   Current Exercise Activities Include -   Do you need help using the phone?  No   Are you deaf or do you have serious difficulty hearing?  No   Do you need help with transportation? No   Do you need help shopping? No   Do you need help preparing meals?  No   Do you need help with  housework?  No   Do you need help with laundry? No   Do you need help taking your medications? No   Do you need help managing money? No   Do you ever drive or ride in a car without wearing a seat belt? No   Have you felt unusual stress, anger or loneliness in the last month? No   Who do you live with? Alone   If you need help, do you have trouble finding someone available to you? No   Have you been bothered in the last four weeks by sexual problems? No   Do you have difficulty concentrating, remembering or making decisions? No         Age-appropriate Screening Schedule:  Refer to the list below for future screening recommendations based on patient's age, sex and/or medical conditions. Orders for these recommended tests are listed in the plan section. The patient has been provided with a written plan.    Health Maintenance   Topic Date Due   • ZOSTER VACCINE (1 of 2) 02/13/2023 (Originally 7/13/2000)   • LIPID PANEL  01/17/2024   • DXA SCAN  05/12/2024   • MAMMOGRAM  07/01/2024   • TDAP/TD VACCINES (2 - Td or Tdap) 02/13/2027   • INFLUENZA VACCINE  Completed        The following portions of the patient's history were reviewed and updated as appropriate: allergies, current medications, past family history, past medical history, past social history, past surgical history and problem list.    Does the patient have evidence of cognitive impairment? No    Aspirin is not on active medication list.  Aspirin use is not indicated based on review of current medical condition/s. Risk of harm outweighs potential benefits.  .    Outpatient Medications Prior to Visit   Medication Sig Dispense Refill   • clonazePAM (KlonoPIN) 0.5 MG tablet Take 1 tablet by mouth At Night As Needed for Anxiety. 30 tablet 2   • estradiol (CLIMARA) 0.0375 MG/24HR PLACE 1 PATCH ON THE SKIN AS DIRECTED BY PROVIDER 1 (ONE) TIME PER WEEK. 4 patch 5   • famotidine (PEPCID) 20 MG tablet Take 1 tab 3 times a day for 2 wks then twice a day ongoing 195 tablet  1   • rosuvastatin (CRESTOR) 5 MG tablet Take 0.5 tablets by mouth Every Night. (Patient taking differently: Take 2.5 mg by mouth 3 (Three) Times a Week.) 46 tablet 1   • temazepam (RESTORIL) 7.5 MG capsule Take 1 capsule by mouth At Night As Needed for Sleep. 30 capsule 2   • venlafaxine XR (EFFEXOR-XR) 75 MG 24 hr capsule Take 1 capsule by mouth Every Morning. 90 capsule 1   • losartan (COZAAR) 25 MG tablet Take 1 tablet by mouth Daily. 30 tablet 5     No facility-administered medications prior to visit.       Patient Active Problem List   Diagnosis   • Primary insomnia   • Generalized anxiety disorder   • Osteoarthritis   • Other fatigue   • Vitamin D deficiency   • Hip pain, bilateral   • Mixed hyperlipidemia   • ADD (attention deficit disorder) without hyperactivity   • Stress and adjustment reaction   • Major depressive disorder with single episode, in partial remission (HCC)   • Allergic rhinitis   • BMI 24.0-24.9, adult   • History of cerebrovascular disease   • Menopausal and postmenopausal disorder   • Benign paroxysmal positional vertigo due to bilateral vestibular disorder   • Mild essential hypertension   • Tinnitus, left ear   • Gastroesophageal reflux disease without esophagitis   • Midline cystocele   • Bilateral hearing loss   • Osteopenia of multiple sites   • Chronic bilateral low back pain without sciatica   • Pain of right breast   • Chronic pain of right knee   • Female pattern hair loss   • Acute nonintractable headache       Advanced Care Planning:  Advance Directive is not on file.  ACP discussion was held with the patient during this visit. Patient does not have an advance directive, information provided.    Review of Systems  The appropriate review of systems is included in the HPI.    Compared to one year ago, the patient feels her physical health is the same.  Compared to one year ago, the patient feels her mental health is the same.    Reviewed chart for potential of high risk  "medication in the elderly: yes  Reviewed chart for potential of harmful drug interactions in the elderly:yes    Objective         Vitals:    02/13/23 1344   BP: 136/86   BP Location: Left arm   Patient Position: Sitting   Cuff Size: Adult   Pulse: 70   Temp: 98.4 °F (36.9 °C)   TempSrc: Infrared   SpO2: 99%   Weight: 63.2 kg (139 lb 6.4 oz)   Height: 164.9 cm (64.92\")   PainSc: 0-No pain     BMI is within normal parameters. No other follow-up for BMI required.    Body mass index is 23.25 kg/m².  Discussed the patient's BMI with her. The BMI is in the acceptable range.    Physical Exam  Vitals and nursing note reviewed.   Constitutional:       Appearance: Normal appearance. She is well-developed.   HENT:      Head: Normocephalic.   Eyes:      Conjunctiva/sclera: Conjunctivae normal.      Pupils: Pupils are equal, round, and reactive to light.   Neck:      Thyroid: No thyromegaly.   Cardiovascular:      Rate and Rhythm: Normal rate and regular rhythm.      Heart sounds: Normal heart sounds.   Pulmonary:      Effort: Pulmonary effort is normal.      Breath sounds: Normal breath sounds. No wheezing.   Chest:   Breasts:     Right: No inverted nipple, mass, nipple discharge, skin change or tenderness.      Left: No inverted nipple, mass, nipple discharge, skin change or tenderness.      Comments: The left breast implant is higher than the right one, but exam is otherwise benign. No nodules, no areas that feel like the implant borders have been disturb.  Abdominal:      General: Bowel sounds are normal.      Palpations: Abdomen is soft.      Tenderness: There is no abdominal tenderness.   Musculoskeletal:         General: No tenderness. Normal range of motion.      Cervical back: Normal range of motion and neck supple.   Lymphadenopathy:      Cervical: No cervical adenopathy.   Skin:     General: Skin is warm and dry.      Findings: No rash.   Neurological:      Mental Status: She is alert and oriented to person, place, " and time.      Cranial Nerves: No cranial nerve deficit.      Sensory: No sensory deficit.      Coordination: Coordination normal.      Gait: Gait normal.   Psychiatric:         Mood and Affect: Mood normal.         Speech: Speech normal.         Behavior: Behavior normal.         Thought Content: Thought content normal.         Judgment: Judgment normal.           ECG 12 Lead    Date/Time: 2/13/2023 2:28 PM  Performed by: Yi Edouard MD  Authorized by: Yi Edouard MD   Comparison: compared with previous ECG   Similar to previous ECG  Rhythm: sinus rhythm  Rate: normal  BPM: 66  Conduction: conduction normal  ST Segments: ST segments normal  T Waves: T waves normal  QRS axis: normal    Clinical impression: normal ECG            Lab Results   Component Value Date    TRIG 230 (H) 01/17/2023    HDL 57 01/17/2023     (H) 01/17/2023    VLDL 40 01/17/2023        Assessment & Plan   Medicare Risks and Personalized Health Plan  CMS Preventative Services Quick Reference  Cardiovascular risk    The above risks/problems have been discussed with the patient.  Pertinent information has been shared with the patient in the After Visit Summary.  Follow up plans and orders are seen below in the Assessment/Plan Section.  Patient Instructions   Problem List Items Addressed This Visit        Cardiac and Vasculature    Mixed hyperlipidemia    Overview     Taking 1/2 of the 5 mg rosuvastatin tablet 3 times a week.         Current Assessment & Plan     She will continue taking 0.5 tablet of rosuvastatin and increase it to 4 nights a week, then to 5 nights a week and gradually up to 7 nights per week. Continue low-fat, low-sugar diet. Increase walking and physical activity.         Relevant Medications    rosuvastatin (CRESTOR) 5 MG tablet    Mild essential hypertension    Current Assessment & Plan     She will continue losartan. Continue low-salt diet. Increase exercise and physical activity.         Relevant  Medications    losartan (COZAAR) 25 MG tablet    Other Relevant Orders    ECG 12 Lead       Gastrointestinal Abdominal     Gastroesophageal reflux disease without esophagitis (Chronic)    Overview     Taking 20 mg famotidine once a day.               Current Assessment & Plan     She will continue taking famotidine. Continue to avoid eating close to bedtime and avoid large meals.         Relevant Medications    famotidine (PEPCID) 20 MG tablet       Genitourinary and Reproductive     Midline cystocele    Current Assessment & Plan     Try to do some Kegel exercises every day. Core strengthening exercises also help with the pelvic floor muscles.            Mental Health    Generalized anxiety disorder    Overview     Taking venlafaxine XR 75 mg daily and continue clonazepam in the evenings.         Current Assessment & Plan     Continue current dose of venlafaxine daily and clonazepam in the evenings.         Relevant Medications    temazepam (RESTORIL) 7.5 MG capsule    venlafaxine XR (EFFEXOR-XR) 75 MG 24 hr capsule    clonazePAM (KlonoPIN) 0.5 MG tablet    Major depressive disorder with single episode, in partial remission (HCC)    Overview     Taking venlafaxine XR 75 mg daily.         Current Assessment & Plan     Continue current dose of venlafaxine daily and clonazepam in the evenings.         Relevant Medications    temazepam (RESTORIL) 7.5 MG capsule    venlafaxine XR (EFFEXOR-XR) 75 MG 24 hr capsule       Musculoskeletal and Injuries    Osteopenia of multiple sites (Chronic)    Overview     5/2019 DEXA shows mild osteopenia with the lowest T score of -1.5.   5/2022 DEXA showed mild osteopenia with a lowest T score of -1.5 in the femoral neck.           Current Assessment & Plan     She will try to walk more and use small hand weights at home more often. May also use exercise bands for resistance exercises.            Sleep    Primary insomnia    Overview     She has decreased  temazepam to 7.5 mg capsule  every evening. Also taking clonazepam 0.5 mg in the evenings to decrease anxiety.         Current Assessment & Plan     She will continue 7.5 mg of temazepam in the evenings for now. She may start trying to leave it off 1 day a week and then in the future maybe leave it off 2 days a week. She will continue taking the clonazepam in the evening for anxiety. She will continue good sleep hygiene. Also recommend increasing daily exercise.         Relevant Medications    temazepam (RESTORIL) 7.5 MG capsule   Other Visit Diagnoses     Medicare annual wellness visit, subsequent    -  Primary           Follow Up:  Next Medicare Wellness visit to be scheduled in 1 year.    Return in about 6 months (around 8/13/2023) for recheck fasting.    An After Visit Summary and PPPS were given to the patient.     Follow Up   Return in about 6 months (around 8/13/2023) for recheck fasting.  Patient was given instructions and counseling regarding her condition or for health maintenance advice. Please see specific information pulled into the AVS if appropriate.     Transcribed from ambient dictation for Yi Edouard MD by Ashley Benavides.  02/13/23   17:45 EST    Patient or patient representative verbalized consent to the visit recording.  I have personally performed the services described in this document as transcribed by the above individual, and it is both accurate and complete.  Yi Edouard MD  2/19/2023  13:45 EST

## 2023-02-13 NOTE — ASSESSMENT & PLAN NOTE
Try to do some Kegel exercises every day. Core strengthening exercises also help with the pelvic floor muscles.

## 2023-02-13 NOTE — ASSESSMENT & PLAN NOTE
She will continue taking famotidine. Continue to avoid eating close to bedtime and avoid large meals.

## 2023-02-13 NOTE — ASSESSMENT & PLAN NOTE
She will continue 7.5 mg of temazepam in the evenings for now. She may start trying to leave it off 1 day a week and then in the future maybe leave it off 2 days a week. She will continue taking the clonazepam in the evening for anxiety. She will continue good sleep hygiene. Also recommend increasing daily exercise.

## 2023-02-19 NOTE — PATIENT INSTRUCTIONS
Patient Instructions   Problem List Items Addressed This Visit          Cardiac and Vasculature    Mixed hyperlipidemia    Overview     Taking 1/2 of the 5 mg rosuvastatin tablet 3 times a week.         Current Assessment & Plan     She will continue taking 0.5 tablet of rosuvastatin and increase it to 4 nights a week, then to 5 nights a week and gradually up to 7 nights per week. Continue low-fat, low-sugar diet. Increase walking and physical activity.         Relevant Medications    rosuvastatin (CRESTOR) 5 MG tablet    Mild essential hypertension    Current Assessment & Plan     She will continue losartan. Continue low-salt diet. Increase exercise and physical activity.         Relevant Medications    losartan (COZAAR) 25 MG tablet    Other Relevant Orders    ECG 12 Lead       Gastrointestinal Abdominal     Gastroesophageal reflux disease without esophagitis (Chronic)    Overview     Taking 20 mg famotidine once a day.               Current Assessment & Plan     She will continue taking famotidine. Continue to avoid eating close to bedtime and avoid large meals.         Relevant Medications    famotidine (PEPCID) 20 MG tablet       Genitourinary and Reproductive     Midline cystocele    Current Assessment & Plan     Try to do some Kegel exercises every day. Core strengthening exercises also help with the pelvic floor muscles.            Mental Health    Generalized anxiety disorder    Overview     Taking venlafaxine XR 75 mg daily and continue clonazepam in the evenings.         Current Assessment & Plan     Continue current dose of venlafaxine daily and clonazepam in the evenings.         Relevant Medications    temazepam (RESTORIL) 7.5 MG capsule    venlafaxine XR (EFFEXOR-XR) 75 MG 24 hr capsule    clonazePAM (KlonoPIN) 0.5 MG tablet    Major depressive disorder with single episode, in partial remission (HCC)    Overview     Taking venlafaxine XR 75 mg daily.         Current Assessment & Plan     Continue  current dose of venlafaxine daily and clonazepam in the evenings.         Relevant Medications    temazepam (RESTORIL) 7.5 MG capsule    venlafaxine XR (EFFEXOR-XR) 75 MG 24 hr capsule       Musculoskeletal and Injuries    Osteopenia of multiple sites (Chronic)    Overview     5/2019 DEXA shows mild osteopenia with the lowest T score of -1.5.   5/2022 DEXA showed mild osteopenia with a lowest T score of -1.5 in the femoral neck.           Current Assessment & Plan     She will try to walk more and use small hand weights at home more often. May also use exercise bands for resistance exercises.            Sleep    Primary insomnia    Overview     She has decreased  temazepam to 7.5 mg capsule every evening. Also taking clonazepam 0.5 mg in the evenings to decrease anxiety.         Current Assessment & Plan     She will continue 7.5 mg of temazepam in the evenings for now. She may start trying to leave it off 1 day a week and then in the future maybe leave it off 2 days a week. She will continue taking the clonazepam in the evening for anxiety. She will continue good sleep hygiene. Also recommend increasing daily exercise.         Relevant Medications    temazepam (RESTORIL) 7.5 MG capsule     Other Visit Diagnoses       Medicare annual wellness visit, subsequent    -  Primary

## 2023-03-06 DIAGNOSIS — F51.01 PRIMARY INSOMNIA: ICD-10-CM

## 2023-03-06 RX ORDER — TEMAZEPAM 7.5 MG/1
7.5 CAPSULE ORAL NIGHTLY PRN
Qty: 30 CAPSULE | Refills: 2 | Status: SHIPPED | OUTPATIENT
Start: 2023-03-06

## 2023-03-06 NOTE — TELEPHONE ENCOUNTER
Rx Refill Note  Requested Prescriptions     Pending Prescriptions Disp Refills   • temazepam (RESTORIL) 7.5 MG capsule 30 capsule 2     Sig: Take 1 capsule by mouth At Night As Needed for Sleep.      Last office visit with prescribing clinician: 2/13/2023   Last telemedicine visit with prescribing clinician: 8/14/2023   Next office visit with prescribing clinician: 8/14/2023                         Would you like a call back once the refill request has been completed: [] Yes [] No    If the office needs to give you a call back, can they leave a voicemail: [] Yes [] No    Shauna Mo LPN  03/06/23, 10:52 EST

## 2023-03-14 ENCOUNTER — TELEPHONE (OUTPATIENT)
Dept: INTERNAL MEDICINE | Facility: CLINIC | Age: 73
End: 2023-03-14
Payer: MEDICARE

## 2023-03-14 NOTE — TELEPHONE ENCOUNTER
Caller: Zaheer Cook    Relationship: Self    Best call back number: [phone]    What is the best time to reach you: ASAP    Who are you requesting to speak with (clinical staff, provider,  specific staff member): CLINICAL    Do you know the name of the person who called: ZAHEER    What was the call regarding: DIARRHEA AND UPSET STOMACH AFTER DISCONTINUING TEMAZIPAM    Do you require a callback: YES

## 2023-03-23 ENCOUNTER — TELEPHONE (OUTPATIENT)
Dept: INTERNAL MEDICINE | Facility: CLINIC | Age: 73
End: 2023-03-23

## 2023-03-23 NOTE — TELEPHONE ENCOUNTER
Caller: Jen Cook    Relationship: Self    Best call back number: [phone]    What medication are you requesting: SINUSES, EAR PAIN    What are your current symptoms:PT STATES HER SINUSES HAVE BEEN RUNNING FOR 3 WEEKS BUT DEVELOPED EAR PAIN THIS MORNING AND THINKS SHE HAS A SINUS INFECTION       Have you had these symptoms before:    [] Yes  [] No    Have you been treated for these symptoms before:   [] Yes  [] No    If a prescription is needed, what is your preferred pharmacy and phone number: [id number] - [place], [place] - [address]  - [phone]  - [phone] FX     Additional notes:

## 2023-04-03 DIAGNOSIS — F41.1 GENERALIZED ANXIETY DISORDER: ICD-10-CM

## 2023-04-04 RX ORDER — CLONAZEPAM 0.5 MG/1
0.5 TABLET ORAL NIGHTLY PRN
Qty: 30 TABLET | Refills: 2 | Status: SHIPPED | OUTPATIENT
Start: 2023-04-04

## 2023-04-04 NOTE — TELEPHONE ENCOUNTER
Rx Refill Note  Requested Prescriptions     Pending Prescriptions Disp Refills   • clonazePAM (KlonoPIN) 0.5 MG tablet 30 tablet 2     Sig: Take 1 tablet by mouth At Night As Needed for Anxiety.      Last office visit with prescribing clinician: 2/13/2023   Last telemedicine visit with prescribing clinician: 8/14/2023   Next office visit with prescribing clinician: 8/14/2023                         Would you like a call back once the refill request has been completed: [] Yes [] No    If the office needs to give you a call back, can they leave a voicemail: [] Yes [] No    Shauna Mo LPN  04/04/23, 07:34 EDT

## 2023-05-04 RX ORDER — VENLAFAXINE HYDROCHLORIDE 75 MG/1
CAPSULE, EXTENDED RELEASE ORAL
Qty: 90 CAPSULE | Refills: 1 | Status: SHIPPED | OUTPATIENT
Start: 2023-05-04

## 2023-05-12 RX ORDER — ESTRADIOL 0.04 MG/D
PATCH TRANSDERMAL
Qty: 12 PATCH | Refills: 1 | Status: SHIPPED | OUTPATIENT
Start: 2023-05-12

## 2023-05-12 NOTE — TELEPHONE ENCOUNTER
Rx Refill Note  Requested Prescriptions     Pending Prescriptions Disp Refills   • estradiol (CLIMARA) 0.0375 MG/24HR [Pharmacy Med Name: ESTRADIOL 0.0375MG PATCH(1/WK)] 12 patch 1     Sig: PLACE 1 PATCH ON THE SKIN AS DIRECTED BY PROVIDER 1 TIME PER WEEK      Last office visit with prescribing clinician: 2/13/2023   Next office visit with prescribing clinician: 8/14/2023                         Would you like a call back once the refill request has been completed: [] Yes [] No    If the office needs to give you a call back, can they leave a voicemail: [] Yes [] No    Rosalind Rebolledo LPN  05/12/23, 09:48 EDT

## 2023-05-18 ENCOUNTER — TELEPHONE (OUTPATIENT)
Dept: INTERNAL MEDICINE | Facility: CLINIC | Age: 73
End: 2023-05-18
Payer: MEDICARE

## 2023-05-18 NOTE — TELEPHONE ENCOUNTER
Caller: MayJen    Relationship: Self    Best call back number: [phone]    What medication are you requesting: N/A    What are your current symptoms: HEADACHE, FEVER, CHILLS, BODYACHES    How long have you been experiencing symptoms: SINCE YESTERDAY     Have you had these symptoms before:    [x] Yes  [] No    Have you been treated for these symptoms before:   [x] Yes  [] No    If a prescription is needed, what is your preferred pharmacy and phone number: [id number] - [place], [place] - [address]  - [phone]  - [phone] FX     Additional notes: PATIENT STATES THAT SHE HAS GIVEN A COVID TEST HERSELF TWICE, AND BOTH WERE NEGATIVE.

## 2023-08-07 RX ORDER — ESTRADIOL 0.04 MG/D
PATCH TRANSDERMAL
Qty: 12 PATCH | Refills: 1 | OUTPATIENT
Start: 2023-08-07

## 2023-08-07 RX ORDER — ESTRADIOL 0.04 MG/D
PATCH TRANSDERMAL
Qty: 12 PATCH | Refills: 1 | Status: SHIPPED | OUTPATIENT
Start: 2023-08-07

## 2023-08-07 NOTE — TELEPHONE ENCOUNTER
Rx Refill Note  Requested Prescriptions     Pending Prescriptions Disp Refills    estradiol (CLIMARA) 0.0375 MG/24HR [Pharmacy Med Name: ESTRADIOL 0.0375MG PATCH(1/WK)] 12 patch 1     Sig: PLACE 1 PATCH ON THE SKIN AS DIRECTED BY PROVIDER 1 TIME PER WEEK      Last office visit with prescribing clinician: 2/13/2023   Next office visit with prescribing clinician: 8/14/2023                         Would you like a call back once the refill request has been completed: [] Yes [] No    If the office needs to give you a call back, can they leave a voicemail: [] Yes [] No    Rosalind Rebolledo LPN  08/07/23, 15:56 EDT

## 2023-08-10 ENCOUNTER — LAB (OUTPATIENT)
Dept: LAB | Facility: HOSPITAL | Age: 73
End: 2023-08-10
Payer: MEDICARE

## 2023-08-10 DIAGNOSIS — E78.2 MIXED HYPERLIPIDEMIA: ICD-10-CM

## 2023-08-10 DIAGNOSIS — I10 MILD ESSENTIAL HYPERTENSION: ICD-10-CM

## 2023-08-10 DIAGNOSIS — E55.9 VITAMIN D DEFICIENCY: ICD-10-CM

## 2023-08-10 LAB
25(OH)D3 SERPL-MCNC: 40.3 NG/ML (ref 30–100)
ALBUMIN SERPL-MCNC: 4.3 G/DL (ref 3.5–5.2)
ALBUMIN UR-MCNC: <1.2 MG/DL
ALBUMIN/GLOB SERPL: 1.5 G/DL
ALP SERPL-CCNC: 73 U/L (ref 39–117)
ALT SERPL W P-5'-P-CCNC: 12 U/L (ref 1–33)
ANION GAP SERPL CALCULATED.3IONS-SCNC: 11 MMOL/L (ref 5–15)
AST SERPL-CCNC: 15 U/L (ref 1–32)
BACTERIA UR QL AUTO: ABNORMAL /HPF
BASOPHILS # BLD AUTO: 0.07 10*3/MM3 (ref 0–0.2)
BASOPHILS NFR BLD AUTO: 0.9 % (ref 0–1.5)
BILIRUB SERPL-MCNC: 0.4 MG/DL (ref 0–1.2)
BILIRUB UR QL STRIP: NEGATIVE
BUN SERPL-MCNC: 20 MG/DL (ref 8–23)
BUN/CREAT SERPL: 23 (ref 7–25)
CALCIUM SPEC-SCNC: 9.5 MG/DL (ref 8.6–10.5)
CHLORIDE SERPL-SCNC: 103 MMOL/L (ref 98–107)
CHOLEST SERPL-MCNC: 237 MG/DL (ref 0–200)
CLARITY UR: ABNORMAL
CO2 SERPL-SCNC: 26 MMOL/L (ref 22–29)
COLOR UR: YELLOW
CREAT SERPL-MCNC: 0.87 MG/DL (ref 0.57–1)
CREAT UR-MCNC: 78.2 MG/DL
DEPRECATED RDW RBC AUTO: 42.1 FL (ref 37–54)
EGFRCR SERPLBLD CKD-EPI 2021: 70 ML/MIN/1.73
EOSINOPHIL # BLD AUTO: 0.37 10*3/MM3 (ref 0–0.4)
EOSINOPHIL NFR BLD AUTO: 5 % (ref 0.3–6.2)
ERYTHROCYTE [DISTWIDTH] IN BLOOD BY AUTOMATED COUNT: 13.6 % (ref 12.3–15.4)
GLOBULIN UR ELPH-MCNC: 2.9 GM/DL
GLUCOSE SERPL-MCNC: 81 MG/DL (ref 65–99)
GLUCOSE UR STRIP-MCNC: NEGATIVE MG/DL
HCT VFR BLD AUTO: 41.8 % (ref 34–46.6)
HDLC SERPL-MCNC: 56 MG/DL (ref 40–60)
HGB BLD-MCNC: 14.5 G/DL (ref 12–15.9)
HGB UR QL STRIP.AUTO: NEGATIVE
HYALINE CASTS UR QL AUTO: ABNORMAL /LPF
IMM GRANULOCYTES # BLD AUTO: 0.03 10*3/MM3 (ref 0–0.05)
IMM GRANULOCYTES NFR BLD AUTO: 0.4 % (ref 0–0.5)
KETONES UR QL STRIP: NEGATIVE
LDLC SERPL CALC-MCNC: 151 MG/DL (ref 0–100)
LDLC/HDLC SERPL: 2.64 {RATIO}
LEUKOCYTE ESTERASE UR QL STRIP.AUTO: ABNORMAL
LYMPHOCYTES # BLD AUTO: 2.36 10*3/MM3 (ref 0.7–3.1)
LYMPHOCYTES NFR BLD AUTO: 31.7 % (ref 19.6–45.3)
MCH RBC QN AUTO: 29.6 PG (ref 26.6–33)
MCHC RBC AUTO-ENTMCNC: 34.7 G/DL (ref 31.5–35.7)
MCV RBC AUTO: 85.3 FL (ref 79–97)
MICROALBUMIN/CREAT UR: NORMAL MG/G{CREAT}
MONOCYTES # BLD AUTO: 0.81 10*3/MM3 (ref 0.1–0.9)
MONOCYTES NFR BLD AUTO: 10.9 % (ref 5–12)
NEUTROPHILS NFR BLD AUTO: 3.81 10*3/MM3 (ref 1.7–7)
NEUTROPHILS NFR BLD AUTO: 51.1 % (ref 42.7–76)
NITRITE UR QL STRIP: NEGATIVE
NRBC BLD AUTO-RTO: 0 /100 WBC (ref 0–0.2)
PH UR STRIP.AUTO: 6 [PH] (ref 5–8)
PLATELET # BLD AUTO: 324 10*3/MM3 (ref 140–450)
PMV BLD AUTO: 9.8 FL (ref 6–12)
POTASSIUM SERPL-SCNC: 4.2 MMOL/L (ref 3.5–5.2)
PROT SERPL-MCNC: 7.2 G/DL (ref 6–8.5)
PROT UR QL STRIP: ABNORMAL
RBC # BLD AUTO: 4.9 10*6/MM3 (ref 3.77–5.28)
RBC # UR STRIP: ABNORMAL /HPF
REF LAB TEST METHOD: ABNORMAL
SODIUM SERPL-SCNC: 140 MMOL/L (ref 136–145)
SP GR UR STRIP: 1.02 (ref 1–1.03)
SQUAMOUS #/AREA URNS HPF: ABNORMAL /HPF
TRANS CELLS #/AREA URNS HPF: ABNORMAL /HPF
TRIGL SERPL-MCNC: 166 MG/DL (ref 0–150)
TSH SERPL DL<=0.05 MIU/L-ACNC: 1.73 UIU/ML (ref 0.27–4.2)
UROBILINOGEN UR QL STRIP: ABNORMAL
VLDLC SERPL-MCNC: 30 MG/DL (ref 5–40)
WBC # UR STRIP: ABNORMAL /HPF
WBC NRBC COR # BLD: 7.45 10*3/MM3 (ref 3.4–10.8)

## 2023-08-10 PROCEDURE — 85025 COMPLETE CBC W/AUTO DIFF WBC: CPT

## 2023-08-10 PROCEDURE — 82570 ASSAY OF URINE CREATININE: CPT

## 2023-08-10 PROCEDURE — 81001 URINALYSIS AUTO W/SCOPE: CPT

## 2023-08-10 PROCEDURE — 82306 VITAMIN D 25 HYDROXY: CPT

## 2023-08-10 PROCEDURE — 82043 UR ALBUMIN QUANTITATIVE: CPT

## 2023-08-10 PROCEDURE — 84443 ASSAY THYROID STIM HORMONE: CPT

## 2023-08-10 PROCEDURE — 80053 COMPREHEN METABOLIC PANEL: CPT

## 2023-08-10 PROCEDURE — 80061 LIPID PANEL: CPT

## 2023-08-14 ENCOUNTER — OFFICE VISIT (OUTPATIENT)
Dept: INTERNAL MEDICINE | Facility: CLINIC | Age: 73
End: 2023-08-14
Payer: MEDICARE

## 2023-08-14 VITALS
WEIGHT: 139.8 LBS | BODY MASS INDEX: 23.29 KG/M2 | HEIGHT: 65 IN | DIASTOLIC BLOOD PRESSURE: 78 MMHG | SYSTOLIC BLOOD PRESSURE: 136 MMHG | OXYGEN SATURATION: 90 % | TEMPERATURE: 98.2 F | HEART RATE: 66 BPM

## 2023-08-14 DIAGNOSIS — F41.1 GENERALIZED ANXIETY DISORDER: ICD-10-CM

## 2023-08-14 DIAGNOSIS — E55.9 VITAMIN D DEFICIENCY: ICD-10-CM

## 2023-08-14 DIAGNOSIS — E78.2 MIXED HYPERLIPIDEMIA: ICD-10-CM

## 2023-08-14 DIAGNOSIS — I10 MILD ESSENTIAL HYPERTENSION: Primary | ICD-10-CM

## 2023-08-14 DIAGNOSIS — F32.4 MAJOR DEPRESSIVE DISORDER WITH SINGLE EPISODE, IN PARTIAL REMISSION: ICD-10-CM

## 2023-08-14 DIAGNOSIS — K21.9 GASTROESOPHAGEAL REFLUX DISEASE WITHOUT ESOPHAGITIS: Chronic | ICD-10-CM

## 2023-08-14 DIAGNOSIS — L81.4 SOLAR LENTIGO: Chronic | ICD-10-CM

## 2023-08-14 DIAGNOSIS — F51.01 PRIMARY INSOMNIA: ICD-10-CM

## 2023-08-14 PROCEDURE — 3075F SYST BP GE 130 - 139MM HG: CPT | Performed by: INTERNAL MEDICINE

## 2023-08-14 PROCEDURE — 1160F RVW MEDS BY RX/DR IN RCRD: CPT | Performed by: INTERNAL MEDICINE

## 2023-08-14 PROCEDURE — 3078F DIAST BP <80 MM HG: CPT | Performed by: INTERNAL MEDICINE

## 2023-08-14 PROCEDURE — 1159F MED LIST DOCD IN RCRD: CPT | Performed by: INTERNAL MEDICINE

## 2023-08-14 PROCEDURE — 99214 OFFICE O/P EST MOD 30 MIN: CPT | Performed by: INTERNAL MEDICINE

## 2023-08-14 RX ORDER — FAMOTIDINE 20 MG/1
20 TABLET, FILM COATED ORAL 2 TIMES DAILY
Qty: 180 TABLET | Refills: 1 | Status: SHIPPED | OUTPATIENT
Start: 2023-08-14

## 2023-08-14 RX ORDER — ROSUVASTATIN CALCIUM 5 MG/1
2.5 TABLET, COATED ORAL NIGHTLY
Qty: 46 TABLET | Refills: 1 | Status: SHIPPED | OUTPATIENT
Start: 2023-08-14

## 2023-08-14 RX ORDER — VENLAFAXINE HYDROCHLORIDE 75 MG/1
75 CAPSULE, EXTENDED RELEASE ORAL EVERY MORNING
Qty: 90 CAPSULE | Refills: 1 | Status: SHIPPED | OUTPATIENT
Start: 2023-08-14

## 2023-08-14 RX ORDER — HYDROCORTISONE 0.5% / HYDROQUINONE 4% / TRETINOIN 0.025% 4; .5; .025 G/100G; G/100G; G/100G
1 EMULSION TOPICAL NIGHTLY
Qty: 30 G | Refills: 0 | Status: SHIPPED | OUTPATIENT
Start: 2023-08-14

## 2023-08-14 NOTE — ASSESSMENT & PLAN NOTE
She will try the generic Tri-Kylie cream every evening after cleansing the face. After she finishes this tube, we can increase the dose next time.

## 2023-08-14 NOTE — ASSESSMENT & PLAN NOTE
She will continue famotidine once or twice per day. Continue to avoid eating close to bedtime and avoid large meals.

## 2023-08-14 NOTE — ASSESSMENT & PLAN NOTE
Continue current dose of venlafaxine daily. Exercising and physical activity also helps decrease symptoms of stress, anxiety, mood.

## 2023-08-14 NOTE — PROGRESS NOTES
[facility]     Jen SANAM May  1950   [id number]      Patient Care Team:  Yi Edouard MD as PCP - General (Internal Medicine)  Pierre Carreno MD as Consulting Physician (Gastroenterology)  Víctor Tanner MD (Inactive) as Consulting Physician (Gynecology)  Leonel Ren MD as Consulting Physician (Orthopedic Surgery)    Chief Complaint   Patient presents with    Hyperlipidemia    New Med Request     Hydroquinone topical            HPI  Patient is a 73 y.o. female presents today for a 6-month follow-up.    Hypertension  The patient's blood pressure is slightly elevated today at 136/78 mmHg. She monitors her blood pressure at home and it has been around 125/64 mmHg. She is currently taking losartan. She denies any swelling in her ankles.    Hyperlipidemia  The patient has not taken rosuvastatin since the end of 05/2023, but has recently restarted after receiving her lab results. Her LDL increased from 114 mg/dL in 01/2023 to 151 mg/dL. Her triglycerides decreased from 230 mg/dL to 166 mg/dL. She has eliminated sugars from her diet. She does not exercise, but tries to be mobile. She walks when she can. She is thinking about doing Pilates. She did not experience any shortness of breath while ascending 3 flights of stairs.    Vitamin D deficiency  The patient's current vitamin D level is 40 ng/mL. She is no longer taking any vitamin D supplements. She does not take any calcium supplement, but takes a collagen vitamin in the morning.    Insomnia  The patient stopped taking temazepam in 01/2023. She is sleeping pretty good. She prefers not to take temazepam.    Depression  The patient is doing well on venlafaxine.    Anxiety  The patient takes clonazepam every night. She feels like it is helping with her anxiety. She has been on clonazepam for many years. She occasionally wakes up throughout the night, but is able to get back to sleep.     GERD  The patient is taking famotidine 1  "to 2 times per day, which is working well for her heartburn.     Solar lentigo  The patient would like to try hydroquinone on her face. She has tried \"burning\" it for removal, but it was not successful. She uses sunscreen every day.    Immunizations  The patient will get her influenza vaccine in 09/2023. She is up to date on her tetanus and pneumonia vaccine. She has not received the new shingles vaccine. She has not had a COVID-19 booster since 2021.    Labs  LDL- 151 mg/dL, 114 mg/dL in 01/2023.  Triglycerides- 166 mg/dL, previously 230 mg/dL.  Blood glucose- 81 mg/dL.  eGFR- WNL.  Creatinine, BUN- WNL.  LFT- WNL.  Microalbumin- WNL.  Thyroid- Normal.  Vitamin D- 40 ng/mL.  WBC- WNL.  RBC-WNL.  Urinalysis- trace blood cells, no infection.    CHRONIC CONDITIONS      Past Medical History:   Diagnosis Date    Anxiety     Benign essential hypertension with target blood pressure below 140/90     Depression     Generalized anxiety disorder with panic attacks     Generalized anxiety disorder with panic attacks 1/7/2019    Major depressive disorder with single episode     Mixed hyperlipidemia     Nutritional anemia 3/1/2019    Orthostatic hypotension 1/3/2022    1/3/2022 Yi Edouard MD  Blood pressures have been controlled by lifestyle.  Blood pressures are now running very low some days.  She feels tired on those days.  The hypotension may be related to poor fluid intake.  Patient was advised to be sure she is getting 8 glasses of fluids a day, and not counting any soda or carbonated beverages.     Post-menopause on HRT (hormone replacement therapy)     Primary insomnia     Rectal pain 4/2/2019    Stroke 2008    R hemispheric Stroke    Vertigo     Vitamin D deficiency        Past Surgical History:   Procedure Laterality Date    AUGMENTATION MAMMAPLASTY  1990    BREAST AUGMENTATION Bilateral 1989    LAPAROSCOPIC ASSISTED VAGINAL HYSTERECTOMY SALPINGO OOPHORECTOMY Bilateral     LAPAROSCOPIC HERNANDEZ PROCEDURE      " "PARTIAL KNEE ARTHROPLASTY  2015    Partial knee replacement 2 years ago       Family History   Adopted: Yes   Family history unknown: Yes       Social History     Socioeconomic History    Marital status:    Tobacco Use    Smoking status: Never    Smokeless tobacco: Never   Vaping Use    Vaping Use: Never used   Substance and Sexual Activity    Alcohol use: Yes     Comment: socially     Drug use: No    Sexual activity: Defer       No Known Allergies    Review of Systems:   The appropriate review of systems is included in the HPI.    Vital Signs  Vitals:    08/14/23 1628   BP: 136/78   BP Location: Left arm   Patient Position: Sitting   Cuff Size: Adult   Pulse: 66   Temp: 98.2 øF (36.8 øC)   TempSrc: Infrared   SpO2: 90%   Weight: 63.4 kg (139 lb 12.8 oz)   Height: 164.9 cm (64.92\")     Body mass index is 23.32 kg/mý.  BMI is within normal parameters. No other follow-up for BMI required.        Current Outpatient Medications:     clonazePAM (KlonoPIN) 0.5 MG tablet, Take 1 tablet by mouth At Night As Needed for Anxiety., Disp: 30 tablet, Rfl: 2    Coenzyme Q10 (COQ10 PO), Take  by mouth Daily., Disp: , Rfl:     estradiol (CLIMARA) 0.0375 MG/24HR, PLACE 1 PATCH ON THE SKIN AS DIRECTED BY PROVIDER 1 TIME PER WEEK, Disp: 12 patch, Rfl: 1    famotidine (PEPCID) 20 MG tablet, Take 1 tablet by mouth 2 (Two) Times a Day. Take 1 tab 3 times a day for 2 wks then twice a day ongoing, Disp: 180 tablet, Rfl: 1    losartan (COZAAR) 25 MG tablet, TAKE 1 TABLET BY MOUTH EVERY DAY, Disp: 90 tablet, Rfl: 1    rosuvastatin (CRESTOR) 5 MG tablet, Take 0.5 tablets by mouth Every Night., Disp: 46 tablet, Rfl: 1    venlafaxine XR (EFFEXOR-XR) 75 MG 24 hr capsule, Take 1 capsule by mouth Every Morning., Disp: 90 capsule, Rfl: 1    Hydroquinone-HC-Tretinoin 4-0.5-0.025 % emulsion, Apply 1 application  topically Every Night., Disp: 30 g, Rfl: 0    Physical Exam:    Physical Exam  Vitals and nursing note reviewed.   Constitutional:     "   Appearance: Normal appearance. She is well-developed.      Comments: Overweight.   HENT:      Head: Normocephalic.   Eyes:      Conjunctiva/sclera: Conjunctivae normal.      Pupils: Pupils are equal, round, and reactive to light.   Neck:      Thyroid: No thyromegaly.   Cardiovascular:      Rate and Rhythm: Normal rate and regular rhythm.      Heart sounds: Normal heart sounds.   Pulmonary:      Effort: Pulmonary effort is normal.      Breath sounds: Normal breath sounds. No wheezing.   Musculoskeletal:         General: Normal range of motion.      Cervical back: Normal range of motion and neck supple.   Lymphadenopathy:      Cervical: No cervical adenopathy.   Skin:     General: Skin is warm and dry.      Comments: Solar lentigos on the face, largest one on the right cheek measures 1 cm.   Neurological:      Mental Status: She is alert and oriented to person, place, and time.   Psychiatric:         Thought Content: Thought content normal.      ACE III MINI        Results Review:    None    CMP:  Lab Results   Component Value Date    BUN 20 08/10/2023    CREATININE 0.87 08/10/2023    EGFRIFNONA 80 12/30/2020    BCR 23.0 08/10/2023     08/10/2023    K 4.2 08/10/2023    CO2 26.0 08/10/2023    CALCIUM 9.5 08/10/2023    ALBUMIN 4.3 08/10/2023    BILITOT 0.4 08/10/2023    ALKPHOS 73 08/10/2023    AST 15 08/10/2023    ALT 12 08/10/2023     HbA1c:  No results found for: HGBA1C  Microalbumin:  Lab Results   Component Value Date    MICROALBUR <1.2 08/10/2023     Lipid Panel  Lab Results   Component Value Date    CHOL 237 (H) 08/10/2023    TRIG 166 (H) 08/10/2023    HDL 56 08/10/2023     (H) 08/10/2023    AST 15 08/10/2023    ALT 12 08/10/2023       Medication Review: Medications reviewed and noted  Patient Instructions   Problem List Items Addressed This Visit          Cardiac and Vasculature    Mixed hyperlipidemia    Overview     1/2 of the 5 mg rosuvastatin tablet          Current Assessment & Plan     She  is resuming 0.5 tablet of the 5 mg rosuvastatin every evening. Add a Pilates class 3 or 4 times per week. Also try to walk more often. Continue to decrease sugars and carbohydrates and fats in the diet.         Relevant Medications    rosuvastatin (CRESTOR) 5 MG tablet    Mild essential hypertension - Primary    Overview     Taking losartan 25mg daily.         Current Assessment & Plan     Continue current dose of losartan daily. Continue to avoid salt in the diet.         Relevant Medications    losartan (COZAAR) 25 MG tablet       Endocrine and Metabolic    Vitamin D deficiency    Current Assessment & Plan     Take calcium with 800 units of vitamin D3 unit daily.            Gastrointestinal Abdominal     Gastroesophageal reflux disease without esophagitis (Chronic)    Overview     Taking 20 mg famotidine once or twice a day.               Current Assessment & Plan     She will continue famotidine once or twice per day. Continue to avoid eating close to bedtime and avoid large meals.         Relevant Medications    famotidine (PEPCID) 20 MG tablet       Mental Health    Generalized anxiety disorder    Overview     Taking venlafaxine XR 75 mg daily and clonazepam in the evenings.         Current Assessment & Plan     Continue current dose of venlafaxine daily. Exercising and physical activity also helps decrease symptoms of stress, anxiety, mood.         Relevant Medications    clonazePAM (KlonoPIN) 0.5 MG tablet    venlafaxine XR (EFFEXOR-XR) 75 MG 24 hr capsule    Major depressive disorder with single episode, in partial remission    Overview     Taking venlafaxine XR 75 mg daily and clonazepam in the evenings.         Current Assessment & Plan     Continue current dose of venlafaxine daily. Exercise and physical activity also helps decrease symptoms of stress, anxiety, and mood.         Relevant Medications    venlafaxine XR (EFFEXOR-XR) 75 MG 24 hr capsule       Skin    Solar lentigo (Chronic)    Current  Assessment & Plan     She will try the generic Tri-Kylie cream every evening after cleansing the face. After she finishes this tube, we can increase the dose next time.         Relevant Medications    Hydroquinone-HC-Tretinoin 4-0.5-0.025 % emulsion       Sleep    Primary insomnia    Overview     Taking clonazepam 0.5 mg in the evenings to decrease anxiety.         Current Assessment & Plan     She will continue clonazepam in the evenings to decrease anxiety. We discussed sleep hygiene including going to bed at the same time and getting up at the same time every day, going to bed early enough to get 7 or 8 hours in bed, reading and relaxing before bedtime, and avoiding the TV, computer, phone, iPad close to bedtime.                Diagnosis Plan   1. Mild essential hypertension        2. Mixed hyperlipidemia        3. Solar lentigo        4. Primary insomnia        5. Major depressive disorder with single episode, in partial remission        6. Generalized anxiety disorder        7. Gastroesophageal reflux disease without esophagitis        8. Vitamin D deficiency                Follow up: She will come back to see me in 6 months for annual physical.    Plan of care reviewed with patient at the conclusion of today's visit. Education was provided regarding diagnosis, management, and any prescribed or recommended OTC medications.Patient verbalizes understanding of and agreement with management plan.       Transcribed from ambient dictation for Yi Edouard MD by Ashley Benavides.  08/14/23   18:15 EDT    Patient or patient representative verbalized consent to the visit recording.  I have personally performed the services described in this document as transcribed by the above individual, and it is both accurate and complete.

## 2023-08-14 NOTE — ASSESSMENT & PLAN NOTE
She will continue clonazepam in the evenings to decrease anxiety. We discussed sleep hygiene including going to bed at the same time and getting up at the same time every day, going to bed early enough to get 7 or 8 hours in bed, reading and relaxing before bedtime, and avoiding the TV, computer, phone, iPad close to bedtime.

## 2023-08-14 NOTE — ASSESSMENT & PLAN NOTE
Continue current dose of venlafaxine daily. Exercise and physical activity also helps decrease symptoms of stress, anxiety, and mood.

## 2023-08-14 NOTE — ASSESSMENT & PLAN NOTE
She is resuming 0.5 tablet of the 5 mg rosuvastatin every evening. Add a Pilates class 3 or 4 times per week. Also try to walk more often. Continue to decrease sugars and carbohydrates and fats in the diet.

## 2023-08-21 NOTE — PATIENT INSTRUCTIONS
Patient Instructions  Problem List Items Addressed This Visit          Cardiac and Vasculature    Mixed hyperlipidemia    Overview     1/2 of the 5 mg rosuvastatin tablet          Current Assessment & Plan     She is resuming 0.5 tablet of the 5 mg rosuvastatin every evening. Add a Pilates class 3 or 4 times per week. Also try to walk more often. Continue to decrease sugars and carbohydrates and fats in the diet.         Relevant Medications    rosuvastatin (CRESTOR) 5 MG tablet    Mild essential hypertension - Primary    Overview     Taking losartan 25mg daily.         Current Assessment & Plan     Continue current dose of losartan daily. Continue to avoid salt in the diet.         Relevant Medications    losartan (COZAAR) 25 MG tablet       Endocrine and Metabolic    Vitamin D deficiency    Current Assessment & Plan     Take calcium with 800 units of vitamin D3 unit daily.            Gastrointestinal Abdominal     Gastroesophageal reflux disease without esophagitis (Chronic)    Overview     Taking 20 mg famotidine once or twice a day.               Current Assessment & Plan     She will continue famotidine once or twice per day. Continue to avoid eating close to bedtime and avoid large meals.         Relevant Medications    famotidine (PEPCID) 20 MG tablet       Mental Health    Generalized anxiety disorder    Overview     Taking venlafaxine XR 75 mg daily and clonazepam in the evenings.         Current Assessment & Plan     Continue current dose of venlafaxine daily. Exercising and physical activity also helps decrease symptoms of stress, anxiety, mood.         Relevant Medications    clonazePAM (KlonoPIN) 0.5 MG tablet    venlafaxine XR (EFFEXOR-XR) 75 MG 24 hr capsule    Major depressive disorder with single episode, in partial remission    Overview     Taking venlafaxine XR 75 mg daily and clonazepam in the evenings.         Current Assessment & Plan     Continue current dose of venlafaxine daily. Exercise and  physical activity also helps decrease symptoms of stress, anxiety, and mood.         Relevant Medications    venlafaxine XR (EFFEXOR-XR) 75 MG 24 hr capsule       Skin    Solar lentigo (Chronic)    Current Assessment & Plan     She will try the generic Tri-Kylie cream every evening after cleansing the face. After she finishes this tube, we can increase the dose next time.         Relevant Medications    Hydroquinone-HC-Tretinoin 4-0.5-0.025 % emulsion       Sleep    Primary insomnia    Overview     Taking clonazepam 0.5 mg in the evenings to decrease anxiety.         Current Assessment & Plan     She will continue clonazepam in the evenings to decrease anxiety. We discussed sleep hygiene including going to bed at the same time and getting up at the same time every day, going to bed early enough to get 7 or 8 hours in bed, reading and relaxing before bedtime, and avoiding the TV, computer, phone, iPad close to bedtime.             BMI for Adults  What is BMI?  Body mass index (BMI) is a number that is calculated from a person's weight and height. BMI can help estimate how much of a person's weight is composed of fat. BMI does not measure body fat directly. Rather, it is an alternative to procedures that directly measure body fat, which can be difficult and expensive.  BMI can help identify people who may be at higher risk for certain medical problems.  What are BMI measurements used for?  BMI is used as a screening tool to identify possible weight problems. It helps determine whether a person is obese, overweight, a healthy weight, or underweight.  BMI is useful for:  Identifying a weight problem that may be related to a medical condition or may increase the risk for medical problems.  Promoting changes, such as changes in diet and exercise, to help reach a healthy weight. BMI screening can be repeated to see if these changes are working.  How is BMI calculated?  BMI involves measuring your weight in relation to your  "height. Both height and weight are measured, and the BMI is calculated from those numbers. This can be done either in English ([place] or metric measurements. Note that charts and online BMI calculators are available to help you find your BMI quickly and easily without having to do these calculations yourself.  To calculate your BMI in English ([place] measurements:    Measure your weight in pounds (lb).  Multiply the number of pounds by 703.  For example, for a person who weighs 180 lb, multiply that number by 703, which equals 126,540.  Measure your height in inches. Then multiply that number by itself to get a measurement called \"inches squared.\"  For example, for a person who is 70 inches tall, the \"inches squared\" measurement is 70 inches x 70 inches, which equals 4,900 inches squared.  Divide the total from step 2 (number of lb x 703) by the total from step 3 (inches squared): 126,540 ö 4,900 = 25.8. This is your BMI.  To calculate your BMI in metric measurements:  Measure your weight in kilograms (kg).  Measure your height in meters (m). Then multiply that number by itself to get a measurement called \"meters squared.\"  For example, for a person who is 1.75 m tall, the \"meters squared\" measurement is 1.75 m x 1.75 m, which is equal to 3.1 meters squared.  Divide the number of kilograms (your weight) by the meters squared number. In this example: 70 ö 3.1 = 22.6. This is your BMI.  What do the results mean?  BMI charts are used to identify whether you are underweight, normal weight, overweight, or obese. The following guidelines will be used:  Underweight: BMI less than 18.5.  Normal weight: BMI between 18.5 and 24.9.  Overweight: BMI between 25 and 29.9.  Obese: BMI of 30 or above.  Keep these notes in mind:  Weight includes both fat and muscle, so someone with a muscular build, such as an athlete, may have a BMI that is higher than 24.9. In cases like these, BMI is not an accurate measure of body fat.  To " determine if excess body fat is the cause of a BMI of 25 or higher, further assessments may need to be done by a health care provider.  BMI is usually interpreted in the same way for men and women.  Where to find more information  For more information about BMI, including tools to quickly calculate your BMI, go to these websites:  Centers for Disease Control and Prevention: www.cdc.gov  American Heart Association: www.heart.org  National Heart, Lung, and Blood [place]: www.nhlbi.nih.gov  Summary  Body mass index (BMI) is a number that is calculated from a person's weight and height.  BMI may help estimate how much of a person's weight is composed of fat. BMI can help identify those who may be at higher risk for certain medical problems.  BMI can be measured using English measurements or metric measurements.  BMI charts are used to identify whether you are underweight, normal weight, overweight, or obese.  This information is not intended to replace advice given to you by your health care provider. Make sure you discuss any questions you have with your health care provider.  Document Revised: 09/09/2020 Document Reviewed: 07/17/2020  [employer] Patient Education c 2023 [employer]  Exercising to Lose Weight  Getting regular exercise is important for everyone. It is especially important if you are overweight. Being overweight increases your risk of heart disease, stroke, diabetes, high blood pressure, and several types of cancer. Exercising, and reducing the calories you consume, can help you lose weight and improve fitness and health.  Exercise can be moderate or vigorous intensity. To lose weight, most people need to do a certain amount of moderate or vigorous-intensity exercise each week.  How can exercise affect me?  You lose weight when you exercise enough to burn more calories than you eat. Exercise also reduces body fat and builds muscle. The more muscle you have, the more calories you burn. Exercise  also:  Improves mood.  Reduces stress and tension.  Improves your overall fitness, flexibility, and endurance.  Increases bone strength.  Moderate-intensity exercise  A person riding a bicycle wearing a safety helmet.      Moderate-intensity exercise is any activity that gets you moving enough to burn at least three times more energy (calories) than if you were sitting.  Examples of moderate exercise include:  Walking a mile in 15 minutes.  Doing light yard work.  Biking at an easy pace.  Most people should get at least 150 minutes of moderate-intensity exercise a week to maintain their body weight.  Vigorous-intensity exercise  Vigorous-intensity exercise is any activity that gets you moving enough to burn at least six times more calories than if you were sitting. When you exercise at this intensity, you should be working hard enough that you are not able to carry on a conversation.  Examples of vigorous exercise include:  Running.  Playing a team sport, such as football, basketball, and soccer.  Jumping rope.  Most people should get at least 75 minutes a week of vigorous exercise to maintain their body weight.  What actions can I take to lose weight?  The amount of exercise you need to lose weight depends on:  Your age.  The type of exercise.  Any health conditions you have.  Your overall physical ability.  Talk to your health care provider about how much exercise you need and what types of activities are safe for you.  Nutrition  A plate with healthy, colorful foods.      Make changes to your diet as told by your health care provider or diet and nutrition specialist (dietitian). This may include:  Eating fewer calories.  Eating more protein.  Eating less unhealthy fats.  Eating a diet that includes fresh fruits and vegetables, whole grains, low-fat dairy products, and lean protein.  Avoiding foods with added fat, salt, and sugar.  Drink plenty of water while you exercise to prevent dehydration or heat  stroke.  Activity  Choose an activity that you enjoy and set realistic goals. Your health care provider can help you make an exercise plan that works for you.  Exercise at a moderate or vigorous intensity most days of the week.  The intensity of exercise may vary from person to person. You can tell how intense a workout is for you by paying attention to your breathing and heartbeat. Most people will notice their breathing and heartbeat get faster with more intense exercise.  Do resistance training twice each week, such as:  Push-ups.  Sit-ups.  Lifting weights.  Using resistance bands.  Getting short amounts of exercise can be just as helpful as long, structured periods of exercise. If you have trouble finding time to exercise, try doing these things as part of your daily routine:  Get up, stretch, and walk around every 30 minutes throughout the day.  Go for a walk during your lunch break.  Park your car farther away from your destination.  If you take public transportation, get off one stop early and walk the rest of the way.  Make phone calls while standing up and walking around.  Take the stairs instead of elevators or escalators.  Wear comfortable clothes and shoes with good support.  Do not exercise so much that you hurt yourself, feel dizzy, or get very short of breath.  Where to find more information  [place] Department of Health and Human Services: www.hhs.gov  Centers for Disease Control and Prevention: www.cdc.gov  Contact a health care provider:  Before starting a new exercise program.  If you have questions or concerns about your weight.  If you have a medical problem that keeps you from exercising.  Get help right away if:  You have any of the following while exercising:  Injury.  Dizziness.  Difficulty breathing or shortness of breath that does not go away when you stop exercising.  Chest pain.  Rapid heartbeat.  These symptoms may represent a serious problem that is an emergency. Do not wait to see if  the symptoms will go away. Get medical help right away. Call your local emergency services (911 in the [place] Do not drive yourself to the hospital.  Summary  Getting regular exercise is especially important if you are overweight.  Being overweight increases your risk of heart disease, stroke, diabetes, high blood pressure, and several types of cancer.  Losing weight happens when you burn more calories than you eat.  Reducing the amount of calories you eat, and getting regular moderate or vigorous exercise each week, helps you lose weight.  This information is not intended to replace advice given to you by your health care provider. Make sure you discuss any questions you have with your health care provider.

## 2023-08-30 RX ORDER — DIAPER,BRIEF,ADULT, DISPOSABLE
EACH MISCELLANEOUS
Qty: 108 CAPSULE | Refills: 6 | Status: SHIPPED | OUTPATIENT
Start: 2023-08-30

## 2023-08-30 NOTE — TELEPHONE ENCOUNTER
Rx Refill Note  Requested Prescriptions     Pending Prescriptions Disp Refills    [facility] CoQ-10 200 MG capsule [Pharmacy Med Name: [facility] CO Q-10 200 MG SOFTGEL] 108 capsule 6     Sig: TAKE 2 CAPSULES BY MOUTH EVERY DAY AT NIGHT      Last office visit with prescribing clinician: 8/14/2023   Last telemedicine visit with prescribing clinician: Visit date not found   Next office visit with prescribing clinician: 2/16/2024                         Would you like a call back once the refill request has been completed: [] Yes [] No    If the office needs to give you a call back, can they leave a voicemail: [] Yes [] No    Shauna Mo LPN  08/30/23, 09:52 EDT

## 2023-09-21 ENCOUNTER — TELEPHONE (OUTPATIENT)
Dept: INTERNAL MEDICINE | Facility: CLINIC | Age: 73
End: 2023-09-21
Payer: MEDICARE

## 2023-09-21 DIAGNOSIS — F41.1 GENERALIZED ANXIETY DISORDER: ICD-10-CM

## 2023-09-21 RX ORDER — CLONAZEPAM 0.5 MG/1
TABLET ORAL
Qty: 60 TABLET | Refills: 2 | Status: SHIPPED | OUTPATIENT
Start: 2023-09-21

## 2023-09-21 RX ORDER — CLONAZEPAM 0.5 MG/1
0.5 TABLET ORAL NIGHTLY PRN
Qty: 30 TABLET | Refills: 2 | Status: CANCELLED | OUTPATIENT
Start: 2023-09-21

## 2023-09-21 NOTE — TELEPHONE ENCOUNTER
Caller: Jen Cook    Relationship: Self    Best call back number: [phone]    Requested Prescriptions:   Requested Prescriptions     Pending Prescriptions Disp Refills    clonazePAM (KlonoPIN) 0.5 MG tablet 30 tablet 2     Sig: Take 1 tablet by mouth At Night As Needed for Anxiety.        Pharmacy where request should be sent: [id number] - [place], [place] - [address] - [phone]  - [phone] FX     Last office visit with prescribing clinician: 8/14/2023   Last telemedicine visit with prescribing clinician: Visit date not found   Next office visit with prescribing clinician: 2/16/2024     Additional details provided by patient: WOULD LIKE THIS WRITTEN FOR 1 AND 1/2 PILL AT THE 0.5 MG STRENGTH    Does the patient have less than a 3 day supply:  [x] Yes  [] No    Would you like a call back once the refill request has been completed: [x] Yes [] No    If the office needs to give you a call back, can they leave a voicemail: [] Yes [x] No    Lloyd Matute, LATONIA  09/21/23 11:58 EDT

## 2023-11-01 RX ORDER — ESTRADIOL 0.04 MG/D
1 PATCH TRANSDERMAL WEEKLY
Qty: 12 PATCH | Refills: 1 | Status: SHIPPED | OUTPATIENT
Start: 2023-11-01

## 2023-11-01 NOTE — TELEPHONE ENCOUNTER
Rx Refill Note  Requested Prescriptions     Pending Prescriptions Disp Refills    estradiol (CLIMARA) 0.0375 MG/24HR 12 patch 1      Last office visit with prescribing clinician: 8/14/2023   Last telemedicine visit with prescribing clinician: Visit date not found   Next office visit with prescribing clinician: 2/16/2024                         Would you like a call back once the refill request has been completed: [] Yes [] No    If the office needs to give you a call back, can they leave a voicemail: [] Yes [] No    Pavan Ovalle MA  11/01/23, 15:09 EDT

## 2023-11-07 RX ORDER — VENLAFAXINE HYDROCHLORIDE 75 MG/1
75 CAPSULE, EXTENDED RELEASE ORAL EVERY MORNING
Qty: 90 CAPSULE | Refills: 1 | Status: SHIPPED | OUTPATIENT
Start: 2023-11-07

## 2023-11-07 NOTE — TELEPHONE ENCOUNTER
Rx Refill Note  Requested Prescriptions     Pending Prescriptions Disp Refills    venlafaxine XR (EFFEXOR-XR) 75 MG 24 hr capsule 90 capsule 1     Sig: Take 1 capsule by mouth Every Morning.      Last office visit with prescribing clinician: 8/14/2023   Last telemedicine visit with prescribing clinician: Visit date not found   Next office visit with prescribing clinician: 11/14/2023                         Would you like a call back once the refill request has been completed: [] Yes [] No    If the office needs to give you a call back, can they leave a voicemail: [] Yes [] No    Pavan Ovalle MA  11/07/23, 13:37 EST

## 2024-02-14 ENCOUNTER — TELEPHONE (OUTPATIENT)
Dept: INTERNAL MEDICINE | Facility: CLINIC | Age: 74
End: 2024-02-14
Payer: MEDICARE

## 2024-02-26 ENCOUNTER — LAB (OUTPATIENT)
Dept: LAB | Facility: HOSPITAL | Age: 74
End: 2024-02-26
Payer: MEDICARE

## 2024-02-26 DIAGNOSIS — I10 MILD ESSENTIAL HYPERTENSION: ICD-10-CM

## 2024-02-26 DIAGNOSIS — E78.2 MIXED HYPERLIPIDEMIA: ICD-10-CM

## 2024-02-26 DIAGNOSIS — E55.9 VITAMIN D DEFICIENCY: ICD-10-CM

## 2024-02-26 LAB
25(OH)D3 SERPL-MCNC: 36.6 NG/ML (ref 30–100)
ALBUMIN SERPL-MCNC: 4.2 G/DL (ref 3.5–5.2)
ALBUMIN UR-MCNC: <1.2 MG/DL
ALBUMIN/GLOB SERPL: 1.4 G/DL
ALP SERPL-CCNC: 59 U/L (ref 39–117)
ALT SERPL W P-5'-P-CCNC: 12 U/L (ref 1–33)
ANION GAP SERPL CALCULATED.3IONS-SCNC: 10.8 MMOL/L (ref 5–15)
AST SERPL-CCNC: 17 U/L (ref 1–32)
BACTERIA UR QL AUTO: ABNORMAL /HPF
BASOPHILS # BLD AUTO: 0.08 10*3/MM3 (ref 0–0.2)
BASOPHILS NFR BLD AUTO: 1.3 % (ref 0–1.5)
BILIRUB SERPL-MCNC: 0.4 MG/DL (ref 0–1.2)
BILIRUB UR QL STRIP: NEGATIVE
BUN SERPL-MCNC: 17 MG/DL (ref 8–23)
BUN/CREAT SERPL: 20.7 (ref 7–25)
CALCIUM SPEC-SCNC: 9.2 MG/DL (ref 8.6–10.5)
CHLORIDE SERPL-SCNC: 105 MMOL/L (ref 98–107)
CHOLEST SERPL-MCNC: 244 MG/DL (ref 0–200)
CLARITY UR: ABNORMAL
CO2 SERPL-SCNC: 26.2 MMOL/L (ref 22–29)
COLOR UR: YELLOW
CREAT SERPL-MCNC: 0.82 MG/DL (ref 0.57–1)
CREAT UR-MCNC: 94.8 MG/DL
DEPRECATED RDW RBC AUTO: 41.3 FL (ref 37–54)
EGFRCR SERPLBLD CKD-EPI 2021: 75.2 ML/MIN/1.73
EOSINOPHIL # BLD AUTO: 0.32 10*3/MM3 (ref 0–0.4)
EOSINOPHIL NFR BLD AUTO: 5 % (ref 0.3–6.2)
ERYTHROCYTE [DISTWIDTH] IN BLOOD BY AUTOMATED COUNT: 13.4 % (ref 12.3–15.4)
GLOBULIN UR ELPH-MCNC: 2.9 GM/DL
GLUCOSE SERPL-MCNC: 85 MG/DL (ref 65–99)
GLUCOSE UR STRIP-MCNC: NEGATIVE MG/DL
HCT VFR BLD AUTO: 44.2 % (ref 34–46.6)
HDLC SERPL-MCNC: 61 MG/DL (ref 40–60)
HGB BLD-MCNC: 14.6 G/DL (ref 12–15.9)
HGB UR QL STRIP.AUTO: NEGATIVE
HYALINE CASTS UR QL AUTO: ABNORMAL /LPF
IMM GRANULOCYTES # BLD AUTO: 0.03 10*3/MM3 (ref 0–0.05)
IMM GRANULOCYTES NFR BLD AUTO: 0.5 % (ref 0–0.5)
KETONES UR QL STRIP: NEGATIVE
LDLC SERPL CALC-MCNC: 147 MG/DL (ref 0–100)
LDLC/HDLC SERPL: 2.35 {RATIO}
LEUKOCYTE ESTERASE UR QL STRIP.AUTO: NEGATIVE
LYMPHOCYTES # BLD AUTO: 2.1 10*3/MM3 (ref 0.7–3.1)
LYMPHOCYTES NFR BLD AUTO: 32.8 % (ref 19.6–45.3)
MCH RBC QN AUTO: 28.3 PG (ref 26.6–33)
MCHC RBC AUTO-ENTMCNC: 33 G/DL (ref 31.5–35.7)
MCV RBC AUTO: 85.7 FL (ref 79–97)
MICROALBUMIN/CREAT UR: NORMAL MG/G{CREAT}
MONOCYTES # BLD AUTO: 0.55 10*3/MM3 (ref 0.1–0.9)
MONOCYTES NFR BLD AUTO: 8.6 % (ref 5–12)
NEUTROPHILS NFR BLD AUTO: 3.32 10*3/MM3 (ref 1.7–7)
NEUTROPHILS NFR BLD AUTO: 51.8 % (ref 42.7–76)
NITRITE UR QL STRIP: NEGATIVE
NRBC BLD AUTO-RTO: 0 /100 WBC (ref 0–0.2)
PH UR STRIP.AUTO: 5.5 [PH] (ref 5–8)
PLATELET # BLD AUTO: 341 10*3/MM3 (ref 140–450)
PMV BLD AUTO: 9.4 FL (ref 6–12)
POTASSIUM SERPL-SCNC: 4.1 MMOL/L (ref 3.5–5.2)
PROT SERPL-MCNC: 7.1 G/DL (ref 6–8.5)
PROT UR QL STRIP: NEGATIVE
RBC # BLD AUTO: 5.16 10*6/MM3 (ref 3.77–5.28)
RBC # UR STRIP: ABNORMAL /HPF
REF LAB TEST METHOD: ABNORMAL
SODIUM SERPL-SCNC: 142 MMOL/L (ref 136–145)
SP GR UR STRIP: 1.02 (ref 1–1.03)
SQUAMOUS #/AREA URNS HPF: ABNORMAL /HPF
TRIGL SERPL-MCNC: 199 MG/DL (ref 0–150)
TSH SERPL DL<=0.05 MIU/L-ACNC: 2.08 UIU/ML (ref 0.27–4.2)
UROBILINOGEN UR QL STRIP: ABNORMAL
VIT B12 BLD-MCNC: 509 PG/ML (ref 211–946)
VLDLC SERPL-MCNC: 36 MG/DL (ref 5–40)
WBC # UR STRIP: ABNORMAL /HPF
WBC NRBC COR # BLD AUTO: 6.4 10*3/MM3 (ref 3.4–10.8)

## 2024-02-26 PROCEDURE — 82043 UR ALBUMIN QUANTITATIVE: CPT

## 2024-02-26 PROCEDURE — 85025 COMPLETE CBC W/AUTO DIFF WBC: CPT

## 2024-02-26 PROCEDURE — 81001 URINALYSIS AUTO W/SCOPE: CPT

## 2024-02-26 PROCEDURE — 82607 VITAMIN B-12: CPT

## 2024-02-26 PROCEDURE — 80053 COMPREHEN METABOLIC PANEL: CPT

## 2024-02-26 PROCEDURE — 82306 VITAMIN D 25 HYDROXY: CPT

## 2024-02-26 PROCEDURE — 84443 ASSAY THYROID STIM HORMONE: CPT

## 2024-02-26 PROCEDURE — 82570 ASSAY OF URINE CREATININE: CPT

## 2024-02-26 PROCEDURE — 80061 LIPID PANEL: CPT

## 2024-02-28 ENCOUNTER — OFFICE VISIT (OUTPATIENT)
Dept: INTERNAL MEDICINE | Facility: CLINIC | Age: 74
End: 2024-02-28
Payer: MEDICARE

## 2024-02-28 VITALS
OXYGEN SATURATION: 95 % | DIASTOLIC BLOOD PRESSURE: 72 MMHG | HEIGHT: 65 IN | TEMPERATURE: 98.4 F | SYSTOLIC BLOOD PRESSURE: 130 MMHG | BODY MASS INDEX: 23.49 KG/M2 | WEIGHT: 141 LBS | HEART RATE: 68 BPM

## 2024-02-28 DIAGNOSIS — I10 MILD ESSENTIAL HYPERTENSION: ICD-10-CM

## 2024-02-28 DIAGNOSIS — E78.2 MIXED HYPERLIPIDEMIA: ICD-10-CM

## 2024-02-28 DIAGNOSIS — R53.83 OTHER FATIGUE: ICD-10-CM

## 2024-02-28 DIAGNOSIS — F32.4 MAJOR DEPRESSIVE DISORDER WITH SINGLE EPISODE, IN PARTIAL REMISSION: ICD-10-CM

## 2024-02-28 DIAGNOSIS — F41.1 GENERALIZED ANXIETY DISORDER: ICD-10-CM

## 2024-02-28 DIAGNOSIS — M85.89 OSTEOPENIA OF MULTIPLE SITES: Chronic | ICD-10-CM

## 2024-02-28 DIAGNOSIS — E55.9 VITAMIN D DEFICIENCY: ICD-10-CM

## 2024-02-28 DIAGNOSIS — Z00.00 MEDICARE ANNUAL WELLNESS VISIT, SUBSEQUENT: Primary | ICD-10-CM

## 2024-02-28 DIAGNOSIS — K21.9 GASTROESOPHAGEAL REFLUX DISEASE WITHOUT ESOPHAGITIS: Chronic | ICD-10-CM

## 2024-02-28 RX ORDER — MELATONIN
2000 DAILY
Start: 2024-02-28

## 2024-02-28 RX ORDER — BUSPIRONE HYDROCHLORIDE 5 MG/1
5 TABLET ORAL 3 TIMES DAILY PRN
Qty: 90 TABLET | Refills: 1 | Status: SHIPPED | OUTPATIENT
Start: 2024-02-28

## 2024-02-28 RX ORDER — ROSUVASTATIN CALCIUM 5 MG/1
2.5 TABLET, COATED ORAL NIGHTLY
Qty: 46 TABLET | Refills: 1 | Status: SHIPPED | OUTPATIENT
Start: 2024-02-28

## 2024-02-28 RX ORDER — MELATONIN
1000 DAILY
COMMUNITY
End: 2024-02-28 | Stop reason: SDUPTHER

## 2024-02-28 NOTE — ASSESSMENT & PLAN NOTE
Continue with the current dose of venlafaxine daily. Continue taking clonazepam in the evenings as needed for anxiety. We will add 1 buspirone tablet every morning. That will help with anxiety at work. She may take another buspirone with lunch and another with the evening meal as needed. We also discussed counseling and she will let us know if she would like to see a counselor again.

## 2024-02-28 NOTE — PATIENT INSTRUCTIONS
Patient Instructions  Problem List Items Addressed This Visit          Cardiac and Vasculature    Mixed hyperlipidemia    Overview     1/2 of the 5 mg rosuvastatin tablet          Current Assessment & Plan     Resume taking 0.5 a tablet of rosuvastatin every night. Continue to improve a low-fat, healthy diet. Increase exercise. Start using a treadmill daily in the mornings before going to work.           Relevant Medications    rosuvastatin (CRESTOR) 5 MG tablet    Other Relevant Orders    Lipid Panel (Completed)    TSH (Completed)    Vitamin B12 (Completed)    ECG 12 Lead    Mild essential hypertension    Current Assessment & Plan     Continue to decrease salt and fat in the diet. Increase exercise and physical activity. Continue with lifestyle control. Watch her blood pressure at home. The goal would be to get it closer to 120s over 70 mmHg.         Relevant Orders    CBC & Differential (Completed)    Comprehensive Metabolic Panel (Completed)    Urinalysis With Microscopic - Urine, Clean Catch (Completed)    Microalbumin / Creatinine Urine Ratio - Urine, Clean Catch (Completed)       Endocrine and Metabolic    Vitamin D deficiency    Current Assessment & Plan     Increase the vitamin D3 tablet to 2000 units daily.         Relevant Orders    Vitamin D,25-Hydroxy (Completed)       Gastrointestinal Abdominal     Gastroesophageal reflux disease without esophagitis (Chronic)    Overview     Taking 20 mg famotidine once or twice a day.               Current Assessment & Plan     Continue taking famotidine as needed. Continue to avoid eating close to bedtime and avoid large meals.         Relevant Medications    famotidine (PEPCID) 20 MG tablet       Mental Health    Generalized anxiety disorder    Overview     Taking venlafaxine XR 75 mg daily and clonazepam in the evenings.         Relevant Medications    clonazePAM (KlonoPIN) 0.5 MG tablet    venlafaxine XR (EFFEXOR-XR) 75 MG 24 hr capsule    busPIRone (BUSPAR) 5 MG  tablet    Major depressive disorder with single episode, in partial remission    Overview     Taking venlafaxine XR 75 mg daily and clonazepam in the evenings.         Current Assessment & Plan     Continue with the current dose of venlafaxine daily. Continue taking clonazepam in the evenings as needed for anxiety. We will add 1 buspirone tablet every morning. That will help with anxiety at work. She may take another buspirone with lunch and another with the evening meal as needed. We also discussed counseling and she will let us know if she would like to see a counselor again.         Relevant Medications    venlafaxine XR (EFFEXOR-XR) 75 MG 24 hr capsule    busPIRone (BUSPAR) 5 MG tablet       Musculoskeletal and Injuries    Osteopenia of multiple sites (Chronic)    Overview     5/2019 DEXA shows mild osteopenia with the lowest T score of -1.5.   5/2022 DEXA showed mild osteopenia with a lowest T score of -1.5 in the femoral neck.           Current Assessment & Plan     T scores were very mildly low on the last DEXA scan in 2022. Continue doing weightbearing exercises. Continue taking calcium and increase the vitamin D dose. Walking on the treadmill will help with bone density and prevention of osteoporosis.            Symptoms and Signs    Other fatigue    Current Assessment & Plan     We discussed sleep hygiene including going to bed at the same time and getting up at the same time every day, going to bed early enough to get 7 or 8 hours in bed, reading and relaxing before bedtime, and avoiding the TV, computer, phone, iPad close to bedtime.   All her blood cell counts, and thyroid labs looked good. Continue to practice good sleep hygiene. Increasing exercise and physical activity will also help.          Other Visit Diagnoses       Medicare annual wellness visit, subsequent    -  Primary           Exercising to Stay Healthy  To become healthy and stay healthy, it is recommended that you do moderate-intensity  and vigorous-intensity exercise. You can tell that you are exercising at a moderate intensity if your heart starts beating faster and you start breathing faster but can still hold a conversation. You can tell that you are exercising at a vigorous intensity if you are breathing much harder and faster and cannot hold a conversation while exercising.  How can exercise benefit me?  Exercising regularly is important. It has many health benefits, such as:  Improving overall fitness, flexibility, and endurance.  Increasing bone density.  Helping with weight control.  Decreasing body fat.  Increasing muscle strength and endurance.  Reducing stress and tension, anxiety, depression, or anger.  Improving overall health.  What guidelines should I follow while exercising?  Before you start a new exercise program, talk with your health care provider.  Do not exercise so much that you hurt yourself, feel dizzy, or get very short of breath.  Wear comfortable clothes and wear shoes with good support.  Drink plenty of water while you exercise to prevent dehydration or heat stroke.  Work out until your breathing and your heartbeat get faster (moderate intensity).  How often should I exercise?  Choose an activity that you enjoy, and set realistic goals. Your health care provider can help you make an activity plan that is individually designed and works best for you.  Exercise regularly as told by your health care provider. This may include:  Doing strength training two times a week, such as:  Lifting weights.  Using resistance bands.  Push-ups.  Sit-ups.  Yoga.  Doing a certain intensity of exercise for a given amount of time. Choose from these options:  A total of 150 minutes of moderate-intensity exercise every week.  A total of 75 minutes of vigorous-intensity exercise every week.  A mix of moderate-intensity and vigorous-intensity exercise every week.  Children, pregnant women, people who have not exercised regularly, people who  are overweight, and older adults may need to talk with a health care provider about what activities are safe to perform. If you have a medical condition, be sure to talk with your health care provider before you start a new exercise program.  What are some exercise ideas?  Moderate-intensity exercise ideas include:  Walking 1 mile (1.6 km) in about 15 minutes.  Biking.  Hiking.  Golfing.  Dancing.  Water aerobics.  Vigorous-intensity exercise ideas include:  Walking 4.5 miles (7.2 km) or more in about 1 hour.  Jogging or running 5 miles (8 km) in about 1 hour.  Biking 10 miles (16.1 km) or more in about 1 hour.  Lap swimming.  Roller-skating or in-line skating.  Cross-country skiing.  Vigorous competitive sports, such as football, basketball, and soccer.  Jumping rope.  Aerobic dancing.  What are some everyday activities that can help me get exercise?  Yard work, such as:  Pushing a .  Raking and bagging leaves.  Washing your car.  Pushing a stroller.  Shoveling snow.  Gardening.  Washing windows or floors.  How can I be more active in my day-to-day activities?  Use stairs instead of an elevator.  Take a walk during your lunch break.  If you drive, park your car farther away from your work or school.  If you take public transportation, get off one stop early and walk the rest of the way.  Stand up or walk around during all of your indoor phone calls.  Get up, stretch, and walk around every 30 minutes throughout the day.  Enjoy exercise with a friend. Support to continue exercising will help you keep a regular routine of activity.  Where to find more information  You can find more information about exercising to stay healthy from:  [place] Department of Health and Human Services: www.hhs.gov  Centers for Disease Control and Prevention (CDC): www.cdc.gov  Summary  Exercising regularly is important. It will improve your overall fitness, flexibility, and endurance.  Regular exercise will also improve your overall  health. It can help you control your weight, reduce stress, and improve your bone density.  Do not exercise so much that you hurt yourself, feel dizzy, or get very short of breath.  Before you start a new exercise program, talk with your health care provider.  This information is not intended to replace advice given to you by your health care provider. Make sure you discuss any questions you have with your health care provider.  Document Revised: 04/15/2022 Document Reviewed: 04/15/2022  [employer]vier Patient Education © 2023 [employer]

## 2024-02-28 NOTE — ASSESSMENT & PLAN NOTE
We discussed sleep hygiene including going to bed at the same time and getting up at the same time every day, going to bed early enough to get 7 or 8 hours in bed, reading and relaxing before bedtime, and avoiding the TV, computer, phone, iPad close to bedtime.   All her blood cell counts, and thyroid labs looked good. Continue to practice good sleep hygiene. Increasing exercise and physical activity will also help.

## 2024-02-28 NOTE — ASSESSMENT & PLAN NOTE
Resume taking 0.5 a tablet of rosuvastatin every night. Continue to improve a low-fat, healthy diet. Increase exercise. Start using a treadmill daily in the mornings before going to work.

## 2024-02-28 NOTE — ASSESSMENT & PLAN NOTE
T scores were very mildly low on the last DEXA scan in 2022. Continue doing weightbearing exercises. Continue taking calcium and increase the vitamin D dose. Walking on the treadmill will help with bone density and prevention of osteoporosis.

## 2024-02-28 NOTE — ASSESSMENT & PLAN NOTE
Continue taking famotidine as needed. Continue to avoid eating close to bedtime and avoid large meals.

## 2024-02-28 NOTE — ASSESSMENT & PLAN NOTE
Continue to decrease salt and fat in the diet. Increase exercise and physical activity. Continue with lifestyle control. Watch her blood pressure at home. The goal would be to get it closer to 120s over 70 mmHg.

## 2024-02-28 NOTE — PROGRESS NOTES
The ABCs of the Annual Wellness Visit  Initial Medicare Wellness Visit    Chief Complaint   Patient presents with    Medicare Wellness-subsequent    Fatigue       Subjective   History of Present Illness:    The patient is here for an annual wellness visit.    Health maintenance  The patient has had no falls. She got all 3 of her words correct. She has an appointment with her eye doctor on 03/13/2024. She is supposed to see her dentist this month. Labs were reviewed with the patient. The patient's blood glucose was 85 mg/dL. Her creatinine and kidney function were good. Her liver function was also normal.    Fatigue  The patient feels tired and works 5 days a week. She frequently wakes up early. She goes to bed by 10:00 PM and wakes up around 6:00 AM. She wakes up incredibly early sometimes. The patient is tired after work.     Hypertension  The patient has been checking her blood pressure at home. She has not had any trouble with it. Sometimes, the diastolic will be low at 60 to 65. The systolic does not get higher than 130. She has not been taking the losartan.     Vitamin D deficiency  The patient's vitamin D was a little low at 36 ng/dL. She is taking magnesium 400 mg 2 times a day and 1000 units of vitamin D.    Pruritus of breast  One of the patient's breasts itches and has erythema. She could have scratched the area. She thinks it is there all the time. She sometimes puts lotion on the area.    Hair loss  She is taking turmeric, thiamine, ashwagandha for hair loss.    CHRONIC CONDITIONS:    HEALTH RISK ASSESSMENT    Recent Hospitalizations:  She was not admitted to the hospital during the last year.       Current Medical Providers:  Patient Care Team:  Yi Edouard MD as PCP - General (Internal Medicine)  Pierre Carreno MD as Consulting Physician (Gastroenterology)  Víctor Tanner MD (Inactive) as Consulting Physician (Gynecology)  Leonel Ren MD as Consulting Physician (Orthopedic  Surgery)    Smoking Status:  Social History     Tobacco Use   Smoking Status Never   Smokeless Tobacco Never       Alcohol Consumption:  Social History     Substance and Sexual Activity   Alcohol Use Yes    Comment: socially        Depression Screen:       2024    10:31 AM   PHQ-2/PHQ-9 Depression Screening   Little Interest or Pleasure in Doing Things 1-->several days   Feeling Down, Depressed or Hopeless 0-->not at all   PHQ-9: Brief Depression Severity Measure Score 1       Fall Risk Screen:  PAUL Fall Risk Assessment was completed, and patient is at LOW risk for falls.Assessment completed on:2024    Health Habits and Functional and Cognitive Screenin/28/2024    10:27 AM   Functional & Cognitive Status   Do you have difficulty preparing food and eating? No   Do you have difficulty bathing yourself, getting dressed or grooming yourself? No   Do you have difficulty using the toilet? No   Do you have difficulty moving around from place to place? No   Do you have trouble with steps or getting out of a bed or a chair? No   Current Diet Well Balanced Diet   Dental Exam Up to date   Eye Exam Up to date   Exercise (times per week) 0 times per week   Current Exercises Include No Regular Exercise   Do you need help using the phone?  No   Are you deaf or do you have serious difficulty hearing?  No   Do you need help to go to places out of walking distance? No   Do you need help shopping? No   Do you need help preparing meals?  No   Do you need help with housework?  No   Do you need help with laundry? No   Do you need help taking your medications? No   Do you need help managing money? No   Do you ever drive or ride in a car without wearing a seat belt? No   Have you felt unusual stress, anger or loneliness in the last month? Yes   Who do you live with? Alone   If you need help, do you have trouble finding someone available to you? No   Have you been bothered in the last four weeks by sexual problems? No    Do you have difficulty concentrating, remembering or making decisions? No         Age-appropriate Screening Schedule:  Refer to the list below for future screening recommendations based on patient's age, sex and/or medical conditions. Orders for these recommended tests are listed in the plan section. The patient has been provided with a written plan.    Health Maintenance   Topic Date Due    ZOSTER VACCINE (1 of 2) Never done    RSV Vaccine - Adults (1 - 1-dose 60+ series) Never done    INFLUENZA VACCINE  08/01/2023    DXA SCAN  05/12/2024    MAMMOGRAM  07/01/2024    LIPID PANEL  02/26/2025    ANNUAL WELLNESS VISIT  02/28/2025    COLORECTAL CANCER SCREENING  04/01/2025    TDAP/TD VACCINES (2 - Td or Tdap) 02/13/2027    HEPATITIS C SCREENING  Completed    COVID-19 Vaccine  Completed    Pneumococcal Vaccine 65+  Completed        The following portions of the patient's history were reviewed and updated as appropriate: allergies, current medications, past family history, past medical history, past social history, past surgical history, and problem list.    Does the patient have evidence of cognitive impairment? No    Aspirin is not on active medication list.  Aspirin use is not indicated based on review of current medical condition/s. Risk of harm outweighs potential benefits.  .    Outpatient Medications Prior to Visit   Medication Sig Dispense Refill    clonazePAM (KlonoPIN) 0.5 MG tablet Take 1 to 1-1/2 tablets in the evening's as needed 60 tablet 2    Coenzyme Q10 (COQ10 PO) Take  by mouth Daily.      estradiol (CLIMARA) 0.0375 MG/24HR Place 1 patch on the skin as directed by provider 1 (One) Time Per Week. 12 patch 1    famotidine (PEPCID) 20 MG tablet Take 1 tablet by mouth 2 (Two) Times a Day. Take 1 tab 3 times a day for 2 wks then twice a day ongoing (Patient taking differently: Take 1 tablet by mouth 2 (Two) Times a Day As Needed.) 180 tablet 1    Magnesium 400 MG capsule Take  by mouth Daily. Complex  w/1200iu vit d3 & 10.5mg zinc      NON FORMULARY Daily. SeroVital - 2 capsules    Multi w/tumeric, theanine, ashwaghanda, rosemary, collagen, cherry, melon, hyraulonic acid, glucosamine      venlafaxine XR (EFFEXOR-XR) 75 MG 24 hr capsule Take 1 capsule by mouth Every Morning. 90 capsule 1    Cholecalciferol 25 MCG (1000 UT) tablet Take 1 tablet by mouth Daily.      rosuvastatin (CRESTOR) 5 MG tablet Take 0.5 tablets by mouth Every Night. (Patient taking differently: Take 0.5 tablets by mouth Every Night. Once in a while) 46 tablet 1    [facility] CoQ-10 200 MG capsule TAKE 2 CAPSULES BY MOUTH EVERY DAY AT NIGHT 108 capsule 6    Hydroquinone-HC-Tretinoin 4-0.5-0.025 % emulsion Apply 1 application  topically Every Night. (Patient not taking: Reported on 2/28/2024) 30 g 0    losartan (COZAAR) 25 MG tablet TAKE 1 TABLET BY MOUTH EVERY DAY (Patient not taking: Reported on 2/28/2024) 90 tablet 1     No facility-administered medications prior to visit.       Patient Active Problem List   Diagnosis    Primary insomnia    Generalized anxiety disorder    Osteoarthritis    Other fatigue    Vitamin D deficiency    Hip pain, bilateral    Mixed hyperlipidemia    ADD (attention deficit disorder) without hyperactivity    Stress and adjustment reaction    Major depressive disorder with single episode, in partial remission    Allergic rhinitis    BMI 24.0-24.9, adult    History of cerebrovascular disease    Menopausal and postmenopausal disorder    Benign paroxysmal positional vertigo due to bilateral vestibular disorder    Mild essential hypertension    Tinnitus, left ear    Gastroesophageal reflux disease without esophagitis    Midline cystocele    Bilateral hearing loss    Osteopenia of multiple sites    Chronic bilateral low back pain without sciatica    Pain of right breast    Chronic pain of right knee    Female pattern hair loss    Acute nonintractable headache    Solar lentigo       Advanced Care Planning:  Advance Directive is not  "on file.  ACP discussion was held with the patient during this visit. Patient does not have an advance directive, information provided.    Review of Systems    Compared to one year ago, the patient feels her physical health is the same.  Compared to one year ago, the patient feels her mental health is the same.    Reviewed chart for potential of high risk medication in the elderly: yes  Reviewed chart for potential of harmful drug interactions in the elderly:yes    Objective         Vitals:    02/28/24 1024   BP: 130/72   BP Location: Left arm   Patient Position: Sitting   Cuff Size: Adult   Pulse: 68   Temp: 98.4 °F (36.9 °C)   TempSrc: Infrared   SpO2: 95%   Weight: 64 kg (141 lb)   Height: 164.9 cm (64.92\")   PainSc: 0-No pain     BMI is within normal parameters. No other follow-up for BMI required.    Body mass index is 23.52 kg/m².  Discussed the patient's BMI with her. The BMI is in the acceptable range.    Physical Exam  Vitals and nursing note reviewed.   Constitutional:       Appearance: She is well-developed.   Eyes:      Conjunctiva/sclera: Conjunctivae normal.      Pupils: Pupils are equal, round, and reactive to light.   Neck:      Thyroid: No thyromegaly.   Cardiovascular:      Rate and Rhythm: Normal rate and regular rhythm.      Heart sounds: Normal heart sounds. No murmur heard.     Comments: No edema.  Pulmonary:      Effort: Pulmonary effort is normal.      Breath sounds: Normal breath sounds. No wheezing.   Chest:   Breasts:     Right: No inverted nipple, mass, nipple discharge, skin change or tenderness.      Left: No inverted nipple, mass, nipple discharge, skin change or tenderness.      Comments: Bilateral implants.  Abdominal:      General: Bowel sounds are normal. There is no distension.      Palpations: Abdomen is soft. There is no mass.      Tenderness: There is no abdominal tenderness.   Musculoskeletal:         General: No tenderness. Normal range of motion.      Cervical back: Normal " range of motion and neck supple.   Lymphadenopathy:      Cervical: No cervical adenopathy.      Comments: No axillary lymphadenopathy.   Skin:     General: Skin is warm and dry.      Findings: No rash.      Comments: Some seborrheic keratoses under the breast and between the breast, benign appearance.   Neurological:      Mental Status: She is alert and oriented to person, place, and time.      Cranial Nerves: No cranial nerve deficit.      Sensory: No sensory deficit.      Coordination: Coordination normal.      Gait: Gait normal.   Psychiatric:         Speech: Speech normal.         Behavior: Behavior normal.         Thought Content: Thought content normal.         Judgment: Judgment normal.           ECG 12 Lead    Date/Time: 2/28/2024 11:36 AM  Performed by: Yi Edouard MD    Authorized by: Yi Edouard MD  Comparison: compared with previous ECG   Similar to previous ECG  Rhythm: sinus rhythm  Rate: normal  BPM: 66  Conduction: conduction normal  ST Segments: ST segments normal  T Waves: T waves normal  QRS axis: normal    Clinical impression: normal ECG          Lab Results   Component Value Date    TRIG 199 (H) 02/26/2024    HDL 61 (H) 02/26/2024     (H) 02/26/2024    VLDL 36 02/26/2024        Assessment & Plan   Medicare Risks and Personalized Health Plan  CMS Preventative Services Quick Reference  Cardiovascular risk  Osteoporosis Risk    The above risks/problems have been discussed with the patient.  Pertinent information has been shared with the patient in the After Visit Summary.  Follow up plans and orders are seen below in the Assessment/Plan Section.  Patient Instructions   Problem List Items Addressed This Visit          Cardiac and Vasculature    Mixed hyperlipidemia    Overview     1/2 of the 5 mg rosuvastatin tablet          Current Assessment & Plan     Resume taking 0.5 a tablet of rosuvastatin every night. Continue to improve a low-fat, healthy diet. Increase exercise. Start  using a treadmill daily in the mornings before going to work.           Relevant Medications    rosuvastatin (CRESTOR) 5 MG tablet    Other Relevant Orders    Lipid Panel (Completed)    TSH (Completed)    Vitamin B12 (Completed)    ECG 12 Lead    Mild essential hypertension    Current Assessment & Plan     Continue to decrease salt and fat in the diet. Increase exercise and physical activity. Continue with lifestyle control. Watch her blood pressure at home. The goal would be to get it closer to 120s over 70 mmHg.         Relevant Orders    CBC & Differential (Completed)    Comprehensive Metabolic Panel (Completed)    Urinalysis With Microscopic - Urine, Clean Catch (Completed)    Microalbumin / Creatinine Urine Ratio - Urine, Clean Catch (Completed)       Endocrine and Metabolic    Vitamin D deficiency    Current Assessment & Plan     Increase the vitamin D3 tablet to 2000 units daily.         Relevant Orders    Vitamin D,25-Hydroxy (Completed)       Gastrointestinal Abdominal     Gastroesophageal reflux disease without esophagitis (Chronic)    Overview     Taking 20 mg famotidine once or twice a day.               Current Assessment & Plan     Continue taking famotidine as needed. Continue to avoid eating close to bedtime and avoid large meals.         Relevant Medications    famotidine (PEPCID) 20 MG tablet       Mental Health    Generalized anxiety disorder    Overview     Taking venlafaxine XR 75 mg daily and clonazepam in the evenings.         Relevant Medications    clonazePAM (KlonoPIN) 0.5 MG tablet    venlafaxine XR (EFFEXOR-XR) 75 MG 24 hr capsule    busPIRone (BUSPAR) 5 MG tablet    Major depressive disorder with single episode, in partial remission    Overview     Taking venlafaxine XR 75 mg daily and clonazepam in the evenings.         Current Assessment & Plan     Continue with the current dose of venlafaxine daily. Continue taking clonazepam in the evenings as needed for anxiety. We will add 1  buspirone tablet every morning. That will help with anxiety at work. She may take another buspirone with lunch and another with the evening meal as needed. We also discussed counseling and she will let us know if she would like to see a counselor again.         Relevant Medications    venlafaxine XR (EFFEXOR-XR) 75 MG 24 hr capsule    busPIRone (BUSPAR) 5 MG tablet       Musculoskeletal and Injuries    Osteopenia of multiple sites (Chronic)    Overview     5/2019 DEXA shows mild osteopenia with the lowest T score of -1.5.   5/2022 DEXA showed mild osteopenia with a lowest T score of -1.5 in the femoral neck.           Current Assessment & Plan     T scores were very mildly low on the last DEXA scan in 2022. Continue doing weightbearing exercises. Continue taking calcium and increase the vitamin D dose. Walking on the treadmill will help with bone density and prevention of osteoporosis.            Symptoms and Signs    Other fatigue    Current Assessment & Plan     We discussed sleep hygiene including going to bed at the same time and getting up at the same time every day, going to bed early enough to get 7 or 8 hours in bed, reading and relaxing before bedtime, and avoiding the TV, computer, phone, iPad close to bedtime.   All her blood cell counts, and thyroid labs looked good. Continue to practice good sleep hygiene. Increasing exercise and physical activity will also help.          Other Visit Diagnoses       Medicare annual wellness visit, subsequent    -  Primary             Follow Up:  Next Medicare Wellness visit to be scheduled in 1 year.      She will come back to see me in 6 months for a fasting follow-up.    An After Visit Summary and PPPS were given to the patient.       Additional E&M Note during same encounter follows:  Patient has multiple medical problems which are significant and separately identifiable that require additional work above and beyond the Medicare Wellness Visit.      Chief  "Complaint  Medicare Wellness-subsequent and Fatigue    Subjective        Jen Cook is also being seen today for uncontrolled hyperlipidemia, anxiety    Hyperlipidemia  The patient walks a lot at the store. She is also getting a treadmill and knows she needs to use it. She is around people all the time and does not like going to the gym. 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 She does not cook much because it is just her. She does not eat hamburgers or French fries. She loves vegetables and salads. Her weakness is yogurt bars at night. She usually eats around 6:00 PM. The patient was taking rosuvastatin well, but then she had leg spasms and stopped taking it. Her LDL was 147 mg/dL and her HDL was 61 mg/dL. Her triglycerides were 199 mg/dL.    Anxiety  The venlafaxine keeps the patient's mood stable. She still has anxiety and her hands sweat with social interaction. She has never used buspirone. The patient went through therapy for a long time and worked through a lot of it. It saved her from rethinking her thoughts. She has changed her thought process.    Objective   Vital Signs:  /72 (BP Location: Left arm, Patient Position: Sitting, Cuff Size: Adult)   Pulse 68   Temp 98.4 °F (36.9 °C) (Infrared)   Ht 164.9 cm (64.92\")   Wt 64 kg (141 lb)   SpO2 95%   BMI 23.52 kg/m²     The following data was reviewed by: Yi Edouard MD on 02/28/2024:  CMP          8/10/2023    09:59 2/26/2024    08:22   CMP   Glucose 81  85    BUN 20  17    Creatinine 0.87  0.82    EGFR 70.0  75.2    Sodium 140  142    Potassium 4.2  4.1    Chloride 103  105    Calcium 9.5  9.2    Total Protein 7.2  7.1    Albumin 4.3  4.2    Globulin 2.9  2.9    Total Bilirubin 0.4  0.4    Alkaline Phosphatase 73  59    AST (SGOT) 15  17    ALT (SGPT) 12  12    Albumin/Globulin Ratio 1.5  1.4    BUN/Creatinine Ratio 23.0  20.7    Anion Gap 11.0  10.8      CBC          8/10/2023    09:59 " 2/26/2024    08:22   CBC   WBC 7.45  6.40    RBC 4.90  5.16    Hemoglobin 14.5  14.6    Hematocrit 41.8  44.2    MCV 85.3  85.7    MCH 29.6  28.3    MCHC 34.7  33.0    RDW 13.6  13.4    Platelets 324  341      CBC w/diff          8/10/2023    09:59 2/26/2024    08:22   CBC w/Diff   WBC 7.45  6.40    RBC 4.90  5.16    Hemoglobin 14.5  14.6    Hematocrit 41.8  44.2    MCV 85.3  85.7    MCH 29.6  28.3    MCHC 34.7  33.0    RDW 13.6  13.4    Platelets 324  341    Neutrophil Rel % 51.1  51.8    Immature Granulocyte Rel % 0.4  0.5    Lymphocyte Rel % 31.7  32.8    Monocyte Rel % 10.9  8.6    Eosinophil Rel % 5.0  5.0    Basophil Rel % 0.9  1.3      Lipid Panel          8/10/2023    09:59 2/26/2024    08:22   Lipid Panel   Total Cholesterol 237  244    Triglycerides 166  199    HDL Cholesterol 56  61    VLDL Cholesterol 30  36    LDL Cholesterol  151  147    LDL/HDL Ratio 2.64  2.35      TSH          8/10/2023    09:59 2/26/2024    08:22   TSH   TSH 1.730  2.080       Assessment and Plan   Diagnoses and all orders for this visit:    1. Medicare annual wellness visit, subsequent (Primary)    2. Mild essential hypertension  Assessment & Plan:  Continue to decrease salt and fat in the diet. Increase exercise and physical activity. Continue with lifestyle control. Watch her blood pressure at home. The goal would be to get it closer to 120s over 70 mmHg.    Orders:  -     CBC & Differential; Future  -     Comprehensive Metabolic Panel; Future  -     Urinalysis With Microscopic - Urine, Clean Catch; Future  -     Microalbumin / Creatinine Urine Ratio - Urine, Clean Catch; Future    3. Mixed hyperlipidemia  Assessment & Plan:  Resume taking 0.5 a tablet of rosuvastatin every night. Continue to improve a low-fat, healthy diet. Increase exercise. Start using a treadmill daily in the mornings before going to work.      Orders:  -     Lipid Panel; Future  -     TSH; Future  -     Vitamin B12; Future  -     ECG 12 Lead    4. Major  depressive disorder with single episode, in partial remission  Assessment & Plan:  Continue with the current dose of venlafaxine daily. Continue taking clonazepam in the evenings as needed for anxiety. We will add 1 buspirone tablet every morning. That will help with anxiety at work. She may take another buspirone with lunch and another with the evening meal as needed. We also discussed counseling and she will let us know if she would like to see a counselor again.      5. Generalized anxiety disorder    6. Osteopenia of multiple sites  Assessment & Plan:  T scores were very mildly low on the last DEXA scan in 2022. Continue doing weightbearing exercises. Continue taking calcium and increase the vitamin D dose. Walking on the treadmill will help with bone density and prevention of osteoporosis.      7. Vitamin D deficiency  Assessment & Plan:  Increase the vitamin D3 tablet to 2000 units daily.    Orders:  -     Vitamin D,25-Hydroxy; Future    8. Other fatigue  Assessment & Plan:  We discussed sleep hygiene including going to bed at the same time and getting up at the same time every day, going to bed early enough to get 7 or 8 hours in bed, reading and relaxing before bedtime, and avoiding the TV, computer, phone, iPad close to bedtime.   All her blood cell counts, and thyroid labs looked good. Continue to practice good sleep hygiene. Increasing exercise and physical activity will also help.      9. Gastroesophageal reflux disease without esophagitis  Assessment & Plan:  Continue taking famotidine as needed. Continue to avoid eating close to bedtime and avoid large meals.      Other orders  -     Cholecalciferol 25 MCG (1000 UT) tablet; Take 2 tablets by mouth Daily.  -     rosuvastatin (CRESTOR) 5 MG tablet; Take 0.5 tablets by mouth Every Night.  Dispense: 46 tablet; Refill: 1  -     busPIRone (BUSPAR) 5 MG tablet; Take 1 tablet by mouth 3 (Three) Times a Day As Needed (anxiety).  Dispense: 90 tablet; Refill:  1             Follow Up   She will come back to see me in 6 months for a fasting follow-up.    Patient was given instructions and counseling regarding her condition or for health maintenance advice. Please see specific information pulled into the AVS if appropriate.     Transcribed from ambient dictation for Yi Edouard MD by Deann Valverde.  02/28/24   14:49 EST    Patient or patient representative verbalized consent to the visit recording.  I have personally performed the services described in this document as transcribed by the above individual, and it is both accurate and complete.

## 2024-03-19 DIAGNOSIS — F41.1 GENERALIZED ANXIETY DISORDER: ICD-10-CM

## 2024-03-19 RX ORDER — ESTRADIOL 0.04 MG/D
1 PATCH TRANSDERMAL WEEKLY
Qty: 12 PATCH | Refills: 1 | Status: SHIPPED | OUTPATIENT
Start: 2024-03-19

## 2024-03-19 RX ORDER — CLONAZEPAM 0.5 MG/1
TABLET ORAL
Qty: 60 TABLET | Refills: 2 | Status: SHIPPED | OUTPATIENT
Start: 2024-03-19

## 2024-03-19 NOTE — TELEPHONE ENCOUNTER
Rx Refill Note  Requested Prescriptions     Pending Prescriptions Disp Refills    clonazePAM (KlonoPIN) 0.5 MG tablet 60 tablet 2     Sig: Take 1 to 1-1/2 tablets in the evening's as needed    Magnesium 400 MG capsule       Sig: Take  by mouth Daily. Complex w/1200iu vit d3 & 10.5mg zinc      Last office visit with prescribing clinician: 2/28/2024   Last telemedicine visit with prescribing clinician: Visit date not found   Next office visit with prescribing clinician: 8/26/2024                         Would you like a call back once the refill request has been completed: [] Yes [] No    If the office needs to give you a call back, can they leave a voicemail: [] Yes [] No    Shauna Mo LPN  03/19/24, 09:34 EDT

## 2024-03-19 NOTE — TELEPHONE ENCOUNTER
Rx Refill Note  Requested Prescriptions     Pending Prescriptions Disp Refills    estradiol (CLIMARA) 0.0375 MG/24HR [Pharmacy Med Name: ESTRADIOL 0.0375MG PATCH(1/WK)] 12 patch 1     Sig: PLACE 1 PATCH ON THE SKIN AS DIRECTED BY PROVIDER 1 (ONE) TIME PER WEEK.      Last office visit with prescribing clinician: 2/28/2024   Last telemedicine visit with prescribing clinician: Visit date not found   Next office visit with prescribing clinician: 3/19/2024                         Would you like a call back once the refill request has been completed: [] Yes [] No    If the office needs to give you a call back, can they leave a voicemail: [] Yes [] No    Jeanine Mckay RN  03/19/24, 09:33 EDT

## 2024-04-09 RX ORDER — BUSPIRONE HYDROCHLORIDE 5 MG/1
5 TABLET ORAL 3 TIMES DAILY PRN
Qty: 90 TABLET | Refills: 1 | Status: SHIPPED | OUTPATIENT
Start: 2024-04-09

## 2024-04-09 NOTE — TELEPHONE ENCOUNTER
Rx Refill Note  Requested Prescriptions     Pending Prescriptions Disp Refills    busPIRone (BUSPAR) 5 MG tablet 90 tablet 1     Sig: Take 1 tablet by mouth 3 (Three) Times a Day As Needed (anxiety).      Last office visit with prescribing clinician: 2/28/2024   Last telemedicine visit with prescribing clinician: Visit date not found   Next office visit with prescribing clinician: 8/26/2024                         Would you like a call back once the refill request has been completed: [] Yes [] No    If the office needs to give you a call back, can they leave a voicemail: [] Yes [] No    Pavan Ovalle MA  04/09/24, 13:03 EDT

## 2024-05-13 RX ORDER — ESTRADIOL 0.04 MG/D
1 PATCH TRANSDERMAL WEEKLY
Qty: 12 PATCH | Refills: 1 | Status: SHIPPED | OUTPATIENT
Start: 2024-05-13

## 2024-05-13 NOTE — TELEPHONE ENCOUNTER
Rx Refill Note  Requested Prescriptions     Pending Prescriptions Disp Refills    estradiol (CLIMARA) 0.0375 MG/24HR 12 patch 1     Sig: Place 1 patch on the skin as directed by provider 1 (One) Time Per Week.      Last office visit with prescribing clinician: 2/28/2024   Last telemedicine visit with prescribing clinician: Visit date not found   Next office visit with prescribing clinician: 8/26/2024                         Would you like a call back once the refill request has been completed: [] Yes [] No    If the office needs to give you a call back, can they leave a voicemail: [] Yes [] No    Rosalind Rebolledo LPN  05/13/24, 14:50 EDT

## 2024-05-14 RX ORDER — VENLAFAXINE HYDROCHLORIDE 75 MG/1
75 CAPSULE, EXTENDED RELEASE ORAL EVERY MORNING
Qty: 90 CAPSULE | Refills: 1 | Status: SHIPPED | OUTPATIENT
Start: 2024-05-14

## 2024-06-11 RX ORDER — BUSPIRONE HYDROCHLORIDE 5 MG/1
5 TABLET ORAL 3 TIMES DAILY PRN
Qty: 90 TABLET | Refills: 1 | Status: SHIPPED | OUTPATIENT
Start: 2024-06-11

## 2024-06-11 NOTE — TELEPHONE ENCOUNTER
Rx Refill Note  Requested Prescriptions     Pending Prescriptions Disp Refills    busPIRone (BUSPAR) 5 MG tablet 90 tablet 1     Sig: Take 1 tablet by mouth 3 (Three) Times a Day As Needed (anxiety).      Last office visit with prescribing clinician: 2/28/2024   Last telemedicine visit with prescribing clinician: Visit date not found   Next office visit with prescribing clinician: 8/26/2024                         Would you like a call back once the refill request has been completed: [] Yes [] No    If the office needs to give you a call back, can they leave a voicemail: [] Yes [] No    Pavan Ovalle MA  06/11/24, 11:29 EDT

## 2024-06-24 RX ORDER — BUSPIRONE HYDROCHLORIDE 5 MG/1
5 TABLET ORAL 3 TIMES DAILY PRN
Qty: 270 TABLET | Refills: 1 | Status: SHIPPED | OUTPATIENT
Start: 2024-06-24

## 2024-07-02 ENCOUNTER — HOSPITAL ENCOUNTER (OUTPATIENT)
Dept: GENERAL RADIOLOGY | Facility: HOSPITAL | Age: 74
Discharge: HOME | End: 2024-07-02
Admitting: NURSE PRACTITIONER
Payer: MEDICARE

## 2024-07-02 ENCOUNTER — TELEPHONE (OUTPATIENT)
Dept: INTERNAL MEDICINE | Facility: CLINIC | Age: 74
End: 2024-07-02

## 2024-07-02 ENCOUNTER — OFFICE VISIT (OUTPATIENT)
Dept: INTERNAL MEDICINE | Facility: CLINIC | Age: 74
End: 2024-07-02
Payer: MEDICARE

## 2024-07-02 VITALS
HEIGHT: 65 IN | HEART RATE: 60 BPM | TEMPERATURE: 97.8 F | BODY MASS INDEX: 24.06 KG/M2 | WEIGHT: 144.4 LBS | DIASTOLIC BLOOD PRESSURE: 70 MMHG | OXYGEN SATURATION: 98 % | SYSTOLIC BLOOD PRESSURE: 110 MMHG

## 2024-07-02 DIAGNOSIS — M54.50 ACUTE BILATERAL LOW BACK PAIN WITHOUT SCIATICA: Primary | ICD-10-CM

## 2024-07-02 DIAGNOSIS — M53.3 SACROILIAC JOINT PAIN: ICD-10-CM

## 2024-07-02 DIAGNOSIS — M54.50 ACUTE BILATERAL LOW BACK PAIN WITHOUT SCIATICA: ICD-10-CM

## 2024-07-02 LAB
BILIRUB BLD-MCNC: NEGATIVE MG/DL
CLARITY, POC: CLEAR
COLOR UR: YELLOW
EXPIRATION DATE: NORMAL
GLUCOSE UR STRIP-MCNC: NEGATIVE MG/DL
KETONES UR QL: NEGATIVE
LEUKOCYTE EST, POC: NEGATIVE
Lab: NORMAL
NITRITE UR-MCNC: NEGATIVE MG/ML
PH UR: 5.5 [PH] (ref 5–8)
PROT UR STRIP-MCNC: NEGATIVE MG/DL
RBC # UR STRIP: NEGATIVE /UL
SP GR UR: 1.02 (ref 1–1.03)
UROBILINOGEN UR QL: NORMAL

## 2024-07-02 PROCEDURE — 99213 OFFICE O/P EST LOW 20 MIN: CPT | Performed by: NURSE PRACTITIONER

## 2024-07-02 PROCEDURE — 3074F SYST BP LT 130 MM HG: CPT | Performed by: NURSE PRACTITIONER

## 2024-07-02 PROCEDURE — 1125F AMNT PAIN NOTED PAIN PRSNT: CPT | Performed by: NURSE PRACTITIONER

## 2024-07-02 PROCEDURE — 1159F MED LIST DOCD IN RCRD: CPT | Performed by: NURSE PRACTITIONER

## 2024-07-02 PROCEDURE — 3078F DIAST BP <80 MM HG: CPT | Performed by: NURSE PRACTITIONER

## 2024-07-02 PROCEDURE — 1160F RVW MEDS BY RX/DR IN RCRD: CPT | Performed by: NURSE PRACTITIONER

## 2024-07-02 PROCEDURE — 81003 URINALYSIS AUTO W/O SCOPE: CPT | Performed by: NURSE PRACTITIONER

## 2024-07-02 PROCEDURE — 72202 X-RAY EXAM SI JOINTS 3/> VWS: CPT

## 2024-07-02 RX ORDER — TIZANIDINE HYDROCHLORIDE 4 MG/1
4 CAPSULE, GELATIN COATED ORAL 3 TIMES DAILY PRN
Qty: 90 CAPSULE | Refills: 0 | Status: SHIPPED | OUTPATIENT
Start: 2024-07-02

## 2024-07-02 RX ORDER — CELECOXIB 200 MG/1
200 CAPSULE ORAL DAILY
Qty: 30 CAPSULE | Refills: 2 | Status: SHIPPED | OUTPATIENT
Start: 2024-07-02

## 2024-07-02 NOTE — PROGRESS NOTES
Office Note     Name: Jen Cook    : 1950     MRN: [id number]     Chief Complaint  lower back pain    Subjective     History of Present Illness:  Jen Cook is a 73 y.o. female who presents today for pain in her lower back that radiates around her left pelvic girdle into left groin for 1 day.  She has tried Advil which has not provided her any relief.  Most pain occurs when she is standing up from the sitting position and if she has to stand for long periods of time.  Denies any numbness or tingling radiating into bilateral lower extremities, denies bowel bladder changes.    Past Medical History:   Past Medical History:   Diagnosis Date    ADHD (attention deficit hyperactivity disorder)     Anxiety     Arthritis     Benign essential hypertension with target blood pressure below 140/90     Depression     Generalized anxiety disorder with panic attacks     Generalized anxiety disorder with panic attacks 2019    Major depressive disorder with single episode     Mixed hyperlipidemia     Nutritional anemia 2019    Orthostatic hypotension 2022    1/3/2022 Yi Edoaurd MD  Blood pressures have been controlled by lifestyle.  Blood pressures are now running very low some days.  She feels tired on those days.  The hypotension may be related to poor fluid intake.  Patient was advised to be sure she is getting 8 glasses of fluids a day, and not counting any soda or carbonated beverages.     Post-menopause on HRT (hormone replacement therapy)     Primary insomnia     Rectal pain 2019    Stroke 2008    R hemispheric Stroke    Vertigo     Vitamin D deficiency        Past Surgical History:   Past Surgical History:   Procedure Laterality Date    AUGMENTATION MAMMAPLASTY  1990    BREAST AUGMENTATION Bilateral     LAPAROSCOPIC ASSISTED VAGINAL HYSTERECTOMY SALPINGO OOPHORECTOMY Bilateral     LAPAROSCOPIC HERNANDEZ PROCEDURE      PARTIAL KNEE ARTHROPLASTY  2015    Partial knee replacement 2  years ago       Family History:   Family History   Adopted: Yes   Family history unknown: Yes       Social History:   Social History     Socioeconomic History    Marital status:    Tobacco Use    Smoking status: Never    Smokeless tobacco: Never   Vaping Use    Vaping status: Never Used   Substance and Sexual Activity    Alcohol use: Yes     Alcohol/week: 1.0 standard drink of alcohol     Types: 1 Glasses of wine per week     Comment: socially     Drug use: No    Sexual activity: Not Currently       Immunizations:   Immunization History   Administered Date(s) Administered    COVID-19 (PFIZER) Purple Cap Monovalent 02/15/2021, 03/06/2021, 10/11/2021    COVID-19 F23 (PFIZER) 12YRS+ (COMIRNATY) 09/18/2023    Fluad Quad 65+ 12/30/2020    Fluzone High Dose =>65 Years ([employer] ONLY) 10/04/2016    Fluzone High-Dose 65+yrs 11/07/2022    Hepatitis A 07/30/2018    Pneumococcal Conjugate 13-Valent (PCV13) 02/13/2017    Pneumococcal Polysaccharide (PPSV23) 07/07/2014, 01/03/2022    Tdap 02/13/2017        Medications:     Current Outpatient Medications:     busPIRone (BUSPAR) 5 MG tablet, Take 1 tablet by mouth 3 (Three) Times a Day As Needed (anxiety)., Disp: 270 tablet, Rfl: 1    Cholecalciferol 25 MCG (1000 UT) tablet, Take 2 tablets by mouth Daily., Disp: , Rfl:     clonazePAM (KlonoPIN) 0.5 MG tablet, Take 1 to 1-1/2 tablets in the evening's as needed, Disp: 60 tablet, Rfl: 2    Coenzyme Q10 (COQ10 PO), Take  by mouth Daily., Disp: , Rfl:     estradiol (CLIMARA) 0.0375 MG/24HR, Place 1 patch on the skin as directed by provider 1 (One) Time Per Week., Disp: 12 patch, Rfl: 1    famotidine (PEPCID) 20 MG tablet, Take 1 tablet by mouth 2 (Two) Times a Day. Take 1 tab 3 times a day for 2 wks then twice a day ongoing (Patient taking differently: Take 1 tablet by mouth 2 (Two) Times a Day As Needed.), Disp: 180 tablet, Rfl: 1    Magnesium 400 MG capsule, Take 1 capsule by mouth Daily. Complex w/1200iu vit d3 & 10.5mg zinc,  "Disp: 90 capsule, Rfl: 1    NON FORMULARY, Daily. SeroVital - 2 capsules  Multi w/tumeric, theanine, ashwaghanda, rosemary, collagen, cherry, melon, hyraulonic acid, glucosamine, Disp: , Rfl:     rosuvastatin (CRESTOR) 5 MG tablet, Take 0.5 tablets by mouth Every Night., Disp: 46 tablet, Rfl: 1    venlafaxine XR (EFFEXOR-XR) 75 MG 24 hr capsule, TAKE 1 CAPSULE BY MOUTH EVERY DAY IN THE MORNING, Disp: 90 capsule, Rfl: 1    celecoxib (CeleBREX) 200 MG capsule, Take 1 capsule by mouth Daily., Disp: 30 capsule, Rfl: 2    TiZANidine (ZANAFLEX) 4 MG capsule, Take 1 capsule by mouth 3 (Three) Times a Day As Needed for Muscle Spasms., Disp: 90 capsule, Rfl: 0    Allergies:   No Known Allergies    Objective     Vital Signs  /70 (BP Location: Left arm, Patient Position: Sitting, Cuff Size: Adult)   Pulse 60   Temp 97.8 °F (36.6 °C) (Infrared)   Ht 164.9 cm (64.92\")   Wt 65.5 kg (144 lb 6.4 oz)   SpO2 98%   BMI 24.09 kg/m²   Estimated body mass index is 24.09 kg/m² as calculated from the following:    Height as of this encounter: 164.9 cm (64.92\").    Weight as of this encounter: 65.5 kg (144 lb 6.4 oz).    BMI is within normal parameters. No other follow-up for BMI required.       Physical Exam  Vitals and nursing note reviewed.   Constitutional:       General: She is not in acute distress.     Appearance: Normal appearance. She is not ill-appearing.   Cardiovascular:      Rate and Rhythm: Normal rate and regular rhythm.      Heart sounds: Normal heart sounds. No murmur heard.  Pulmonary:      Effort: Pulmonary effort is normal.      Breath sounds: Normal breath sounds.   Musculoskeletal:      Lumbar back: Spasms and tenderness present.        Back:    Neurological:      Mental Status: She is alert.      Gait: Gait abnormal.            Procedures     Results:  XR Sacroiliac Joints 3+ View    Result Date: 7/5/2024  Impression: Symmetric mild osteoarthritic change of the SI joints.   Electronically Signed: Mayank " MD Vinayak  7/5/2024 3:37 PM EDT  Workstation ID: [id number]      Assessment and Plan     Assessment/Plan:  Diagnoses and all orders for this visit:    1. Acute bilateral low back pain without sciatica (Primary)  Assessment & Plan:  -Will offer physical therapy.  Tizanidine 4 mg 3 times daily as needed and Celebrex 200 mg daily.    Orders:  -     POC Urinalysis Dipstick, Automated  -     TiZANidine (ZANAFLEX) 4 MG capsule; Take 1 capsule by mouth 3 (Three) Times a Day As Needed for Muscle Spasms.  Dispense: 90 capsule; Refill: 0    2. Sacroiliac joint pain  Assessment & Plan:  - Celebrex 200 mg daily, tizanidine 4 mg 3 times daily as needed.  - Offer physical therapy.  If physical therapy does not relieve pain, may need injection for better pain control.  -Rest, apply heat as needed.    Orders:  -     celecoxib (CeleBREX) 200 MG capsule; Take 1 capsule by mouth Daily.  Dispense: 30 capsule; Refill: 2  -     TiZANidine (ZANAFLEX) 4 MG capsule; Take 1 capsule by mouth 3 (Three) Times a Day As Needed for Muscle Spasms.  Dispense: 90 capsule; Refill: 0  -     XR Sacroiliac Joints 3+ View; Future        Follow Up  Return for Next scheduled follow up.    LLOYD Tapia   [facility] [place] [id number]

## 2024-07-02 NOTE — TELEPHONE ENCOUNTER
Caller: Jen Cook     Relationship: SELF    Best call back number:  [phone]    What is your medical concern? BACK PAIN  ON BOTH SIDES RADIATING UP AND SOMETIMES COMES TO THE FRONT;  PATIENT HAS BEEN TAKING ADVIL AND THIS IS NOT HELPING, AND SHE WOULD LIKE TO KNOW WHAT TO DO MOVING FORWARD    How long has this issue been going on? OFF AND ON COUPLE DAYS    Is your provider already aware of this issue? NO    Have you been treated for this issue? NO    PLEASE CALL TO ADVISE

## 2024-07-08 PROBLEM — M53.3 SACROILIAC JOINT PAIN: Status: ACTIVE | Noted: 2024-07-08

## 2024-07-08 PROBLEM — M54.50 ACUTE BILATERAL LOW BACK PAIN WITHOUT SCIATICA: Status: ACTIVE | Noted: 2022-06-24

## 2024-07-08 NOTE — ASSESSMENT & PLAN NOTE
- Celebrex 200 mg daily, tizanidine 4 mg 3 times daily as needed.  - Offer physical therapy.  If physical therapy does not relieve pain, may need injection for better pain control.  -Rest, apply heat as needed.

## 2024-07-09 ENCOUNTER — TELEPHONE (OUTPATIENT)
Dept: INTERNAL MEDICINE | Facility: CLINIC | Age: 74
End: 2024-07-09

## 2024-07-09 NOTE — TELEPHONE ENCOUNTER
Caller: Jen Cook    Relationship: Self    Best call back number: [phone]    What is the best time to reach you: ANYTIME     Who are you requesting to speak with (clinical staff, provider,  specific staff member): HERMILO PINO         What was the call regarding: THE PATIENT WOULD LIKE TO KNOW IF SHE CAN GET INJECTIONS OR A HIP REPLACEMENT FOR HER HIP ISSUES

## 2024-07-10 ENCOUNTER — TELEPHONE (OUTPATIENT)
Dept: INTERNAL MEDICINE | Facility: CLINIC | Age: 74
End: 2024-07-10
Payer: MEDICARE

## 2024-07-10 NOTE — TELEPHONE ENCOUNTER
Caller: Jen Cook    Relationship: Self    Best call back number: [phone]    What form or medical record are you requesting: XRAY ON DISC     Additional notes: PATIENT HAD XRAYS DONE ON 7/2/24 AND NEEDS THEM PUT ON A DISC TO TAKE TO ANOTHER PROVIDER. PLEASE CALL PATIENT ONCE DISC IS READY FOR

## 2024-07-10 NOTE — TELEPHONE ENCOUNTER
Patient notified she will need to call [facility] to pickup disc. Phone number provided.

## 2024-07-29 ENCOUNTER — TELEPHONE (OUTPATIENT)
Dept: INTERNAL MEDICINE | Facility: CLINIC | Age: 74
End: 2024-07-29
Payer: MEDICARE

## 2024-07-29 NOTE — TELEPHONE ENCOUNTER
Received fax for refill of tizanidine hcl 4mg capsule. The quantity on request is 270 or a 90 day supply.    LVM for pt, asking for callback. Does pt want more tizanidine? How is her lower back pain?

## 2024-08-26 ENCOUNTER — OFFICE VISIT (OUTPATIENT)
Dept: INTERNAL MEDICINE | Facility: CLINIC | Age: 74
End: 2024-08-26
Payer: MEDICARE

## 2024-08-26 VITALS
TEMPERATURE: 98.6 F | DIASTOLIC BLOOD PRESSURE: 78 MMHG | OXYGEN SATURATION: 97 % | HEART RATE: 78 BPM | BODY MASS INDEX: 23.63 KG/M2 | HEIGHT: 65 IN | WEIGHT: 141.8 LBS | SYSTOLIC BLOOD PRESSURE: 120 MMHG

## 2024-08-26 DIAGNOSIS — I10 MILD ESSENTIAL HYPERTENSION: ICD-10-CM

## 2024-08-26 DIAGNOSIS — F32.4 MAJOR DEPRESSIVE DISORDER WITH SINGLE EPISODE, IN PARTIAL REMISSION: ICD-10-CM

## 2024-08-26 DIAGNOSIS — E55.9 VITAMIN D DEFICIENCY: ICD-10-CM

## 2024-08-26 DIAGNOSIS — M54.50 ACUTE BILATERAL LOW BACK PAIN WITHOUT SCIATICA: ICD-10-CM

## 2024-08-26 DIAGNOSIS — E78.2 MIXED HYPERLIPIDEMIA: ICD-10-CM

## 2024-08-26 DIAGNOSIS — F51.01 PRIMARY INSOMNIA: ICD-10-CM

## 2024-08-26 DIAGNOSIS — F41.1 GENERALIZED ANXIETY DISORDER: Primary | ICD-10-CM

## 2024-08-26 DIAGNOSIS — N81.10 VAGINAL PROLAPSE: ICD-10-CM

## 2024-08-26 PROCEDURE — 99214 OFFICE O/P EST MOD 30 MIN: CPT | Performed by: INTERNAL MEDICINE

## 2024-08-26 PROCEDURE — 1160F RVW MEDS BY RX/DR IN RCRD: CPT | Performed by: INTERNAL MEDICINE

## 2024-08-26 PROCEDURE — 3074F SYST BP LT 130 MM HG: CPT | Performed by: INTERNAL MEDICINE

## 2024-08-26 PROCEDURE — 1159F MED LIST DOCD IN RCRD: CPT | Performed by: INTERNAL MEDICINE

## 2024-08-26 PROCEDURE — 3078F DIAST BP <80 MM HG: CPT | Performed by: INTERNAL MEDICINE

## 2024-08-26 PROCEDURE — 1126F AMNT PAIN NOTED NONE PRSNT: CPT | Performed by: INTERNAL MEDICINE

## 2024-08-26 NOTE — PATIENT INSTRUCTIONS
Patient Instructions  Problem List Items Addressed This Visit          Cardiac and Vasculature    Mixed hyperlipidemia    Overview     1/2 of the 5 mg rosuvastatin tablet          Relevant Medications    rosuvastatin (CRESTOR) 5 MG tablet    Mild essential hypertension    Overview     Lifestyle control         Relevant Orders    Comprehensive Metabolic Panel    CBC & Differential       Endocrine and Metabolic    Vitamin D deficiency    Relevant Orders    Vitamin D,25-Hydroxy       Genitourinary and Reproductive     Vaginal prolapse    Relevant Orders    Ambulatory Referral to Gynecology       Mental Health    Generalized anxiety disorder - Primary    Overview     Taking venlafaxine XR 75 mg daily and clonazepam in the evenings.         Relevant Medications    clonazePAM (KlonoPIN) 0.5 MG tablet    venlafaxine XR (EFFEXOR-XR) 75 MG 24 hr capsule    busPIRone (BUSPAR) 5 MG tablet    Major depressive disorder with single episode, in partial remission    Overview     Taking venlafaxine XR 75 mg daily and buspirone as needed and clonazepam in the evenings.         Relevant Medications    venlafaxine XR (EFFEXOR-XR) 75 MG 24 hr capsule    busPIRone (BUSPAR) 5 MG tablet       Musculoskeletal and Injuries    Acute bilateral low back pain without sciatica    Relevant Medications    TiZANidine (ZANAFLEX) 4 MG capsule       Sleep    Primary insomnia    Overview     Taking clonazepam 0.5 mg in the evenings to decrease anxiety.            Exercising to Stay Healthy  To become healthy and stay healthy, it is recommended that you do moderate-intensity and vigorous-intensity exercise. You can tell that you are exercising at a moderate intensity if your heart starts beating faster and you start breathing faster but can still hold a conversation. You can tell that you are exercising at a vigorous intensity if you are breathing much harder and faster and cannot hold a conversation while exercising.  How can exercise benefit  me?  Exercising regularly is important. It has many health benefits, such as:  Improving overall fitness, flexibility, and endurance.  Increasing bone density.  Helping with weight control.  Decreasing body fat.  Increasing muscle strength and endurance.  Reducing stress and tension, anxiety, depression, or anger.  Improving overall health.  What guidelines should I follow while exercising?  Before you start a new exercise program, talk with your health care provider.  Do not exercise so much that you hurt yourself, feel dizzy, or get very short of breath.  Wear comfortable clothes and wear shoes with good support.  Drink plenty of water while you exercise to prevent dehydration or heat stroke.  Work out until your breathing and your heartbeat get faster (moderate intensity).  How often should I exercise?  Choose an activity that you enjoy, and set realistic goals. Your health care provider can help you make an activity plan that is individually designed and works best for you.  Exercise regularly as told by your health care provider. This may include:  Doing strength training two times a week, such as:  Lifting weights.  Using resistance bands.  Push-ups.  Sit-ups.  Yoga.  Doing a certain intensity of exercise for a given amount of time. Choose from these options:  A total of 150 minutes of moderate-intensity exercise every week.  A total of 75 minutes of vigorous-intensity exercise every week.  A mix of moderate-intensity and vigorous-intensity exercise every week.  Children, pregnant women, people who have not exercised regularly, people who are overweight, and older adults may need to talk with a health care provider about what activities are safe to perform. If you have a medical condition, be sure to talk with your health care provider before you start a new exercise program.  What are some exercise ideas?  Moderate-intensity exercise ideas include:  Walking 1 mile (1.6 km) in about 15  minutes.  Biking.  Hiking.  Golfing.  Dancing.  Water aerobics.  Vigorous-intensity exercise ideas include:  Walking 4.5 miles (7.2 km) or more in about 1 hour.  Jogging or running 5 miles (8 km) in about 1 hour.  Biking 10 miles (16.1 km) or more in about 1 hour.  Lap swimming.  Roller-skating or in-line skating.  Cross-country skiing.  Vigorous competitive sports, such as football, basketball, and soccer.  Jumping rope.  Aerobic dancing.  What are some everyday activities that can help me get exercise?  Yard work, such as:  Pushing a .  Raking and bagging leaves.  Washing your car.  Pushing a stroller.  Shoveling snow.  Gardening.  Washing windows or floors.  How can I be more active in my day-to-day activities?  Use stairs instead of an elevator.  Take a walk during your lunch break.  If you drive, park your car farther away from your work or school.  If you take public transportation, get off one stop early and walk the rest of the way.  Stand up or walk around during all of your indoor phone calls.  Get up, stretch, and walk around every 30 minutes throughout the day.  Enjoy exercise with a friend. Support to continue exercising will help you keep a regular routine of activity.  Where to find more information  You can find more information about exercising to stay healthy from:  [place] Department of Health and Human Services: www.hhs.gov  Centers for Disease Control and Prevention (CDC): www.cdc.gov  Summary  Exercising regularly is important. It will improve your overall fitness, flexibility, and endurance.  Regular exercise will also improve your overall health. It can help you control your weight, reduce stress, and improve your bone density.  Do not exercise so much that you hurt yourself, feel dizzy, or get very short of breath.  Before you start a new exercise program, talk with your health care provider.  This information is not intended to replace advice given to you by your health care  provider. Make sure you discuss any questions you have with your health care provider.  Document Revised: 04/15/2022 Document Reviewed: 04/15/2022  [employer] Patient Education © 2023 [employer] Heart-Healthy Eating Plan  Many factors influence your heart (coronary) health, including eating and exercise habits. Coronary risk increases with abnormal blood fat (lipid) levels. Heart-healthy meal planning includes limiting unhealthy fats, increasing healthy fats, and making other diet and lifestyle changes.  What is my plan?  Your health care provider may recommend that you:  Limit your fat intake to _________% or less of your total calories each day.  Limit your saturated fat intake to _________% or less of your total calories each day.  Limit the amount of cholesterol in your diet to less than _________ mg per day.  What are tips for following this plan?  Cooking  Cook foods using methods other than frying. Baking, boiling, grilling, and broiling are all good options. Other ways to reduce fat include:  Removing the skin from poultry.  Removing all visible fats from meats.  Steaming vegetables in water or broth.  Meal planning  A plate with examples of foods in a healthy diet.      At meals, imagine dividing your plate into fourths:  Fill one-half of your plate with vegetables and green salads.  Fill one-fourth of your plate with whole grains.  Fill one-fourth of your plate with lean protein foods.  Eat 4-5 servings of vegetables per day. One serving equals 1 cup raw or cooked vegetable, or 2 cups raw leafy greens.  Eat 4-5 servings of fruit per day. One serving equals 1 medium whole fruit, ¼ cup dried fruit, ½ cup fresh, frozen, or canned fruit, or ½ cup 100% fruit juice.  Eat more foods that contain soluble fiber. Examples include apples, broccoli, carrots, beans, peas, and barley. Aim to get 25-30 g of fiber per day.  Increase your consumption of legumes, nuts, and seeds to 4-5 servings per week. One serving of  dried beans or legumes equals ½ cup cooked, 1 serving of nuts is ¼ cup, and 1 serving of seeds equals 1 tablespoon.  Fats  Choose healthy fats more often. Choose monounsaturated and polyunsaturated fats, such as olive and canola oils, flaxseeds, walnuts, almonds, and seeds.  Eat more omega-3 fats. Choose salmon, mackerel, sardines, tuna, flaxseed oil, and ground flaxseeds. Aim to eat fish at least 2 times each week.  Check food labels carefully to identify foods with trans fats or high amounts of saturated fat.  Limit saturated fats. These are found in animal products, such as meats, butter, and cream. Plant sources of saturated fats include palm oil, palm kernel oil, and coconut oil.  Avoid foods with partially hydrogenated oils in them. These contain trans fats. Examples are stick margarine, some tub margarines, cookies, crackers, and other baked goods.  Avoid fried foods.  General information  Eat more home-cooked food and less restaurant, buffet, and fast food.  Limit or avoid alcohol.  Limit foods that are high in starch and sugar.  Lose weight if you are overweight. Losing just 5-10% of your body weight can help your overall health and prevent diseases such as diabetes and heart disease.  Monitor your salt (sodium) intake, especially if you have high blood pressure. Talk with your health care provider about your sodium intake.  Try to incorporate more vegetarian meals weekly.  What foods can I eat?  Fruits  All fresh, canned (in natural juice), or frozen fruits.  Vegetables  Fresh or frozen vegetables (raw, steamed, roasted, or grilled). Green salads.  Grains  Most grains. Choose whole wheat and whole grains most of the time. Rice and pasta, including brown rice and pastas made with whole wheat.  Meats and other proteins  Lean, well-trimmed beef, veal, pork, and lamb. Chicken and turkey without skin. All fish and shellfish. Wild duck, rabbit, pheasant, and venison. Egg whites or low-cholesterol egg  substitutes. Dried beans, peas, lentils, and tofu. Seeds and most nuts.  Dairy  Low-fat or nonfat cheeses, including ricotta and mozzarella. Skim or 1% milk (liquid, powdered, or evaporated). Buttermilk made with low-fat milk. Nonfat or low-fat yogurt.  Fats and oils  Non-hydrogenated (trans-free) margarines. Vegetable oils, including soybean, sesame, sunflower, olive, peanut, safflower, corn, canola, and cottonseed. Salad dressings or mayonnaise made with a vegetable oil.  Beverages  Water (mineral or sparkling). Coffee and tea. Diet carbonated beverages.  Sweets and desserts  Sherbet, gelatin, and fruit ice. Small amounts of dark chocolate.  Limit all sweets and desserts.  Seasonings and condiments  All seasonings and condiments.  The items listed above may not be a complete list of foods and beverages you can eat. Contact a dietitian for more options.  What foods are not recommended?  Fruits  Canned fruit in heavy syrup. Fruit in cream or butter sauce. Fried fruit. Limit coconut.  Vegetables  Vegetables cooked in cheese, cream, or butter sauce. Fried vegetables.  Grains  Breads made with saturated or trans fats, oils, or whole milk. Croissants. Sweet rolls. Donuts. High-fat crackers, such as cheese crackers.  Meats and other proteins  Fatty meats, such as hot dogs, ribs, sausage, sidhu, rib-eye roast or steak. High-fat deli meats, such as salami and bologna. Caviar. Domestic duck and goose. Organ meats, such as liver.  Dairy  Cream, sour cream, cream cheese, and creamed cottage cheese. Whole-milk cheeses. Whole or 2% milk (liquid, evaporated, or condensed). Whole buttermilk. Cream sauce or high-fat cheese sauce. Whole-milk yogurt.  Fats and oils  Meat fat, or shortening. Cocoa butter, hydrogenated oils, palm oil, coconut oil, palm kernel oil. Solid fats and shortenings, including sidhu fat, salt pork, lard, and butter. Nondairy cream substitutes. Salad dressings with cheese or sour cream.  Beverages  Regular  sodas and any drinks with added sugar.  Sweets and desserts  Frosting. Pudding. Cookies. Cakes. Pies. Milk chocolate or white chocolate. Buttered syrups. Full-fat ice cream or ice cream drinks.  The items listed above may not be a complete list of foods and beverages to avoid. Contact a dietitian for more information.  Summary  Heart-healthy meal planning includes limiting unhealthy fats, increasing healthy fats, and making other diet and lifestyle changes.  Lose weight if you are overweight. Losing just 5-10% of your body weight can help your overall health and prevent diseases such as diabetes and heart disease.  Focus on eating a balance of foods, including fruits and vegetables, low-fat or nonfat dairy, lean protein, nuts and legumes, whole grains, and heart-healthy oils and fats.  This information is not intended to replace advice given to you by your health care provider. Make sure you discuss any questions you have with your health care provider.  Document Revised: 04/28/2022 Document Reviewed: 04/28/2022  [employer] Patient Education © 2022 [employer]

## 2024-08-26 NOTE — PROGRESS NOTES
[facility]     Jen SANAM [birth year]  1950   [id number]      Patient Care Team:  Yi Edouard MD as PCP - General (Internal Medicine)  Pierre Carreno MD as Consulting Physician (Gastroenterology)  Víctor Tanner MD (Inactive) as Consulting Physician (Gynecology)  Leonel Ren MD as Consulting Physician (Orthopedic Surgery)    Chief Complaint   Patient presents with    6 Month Follow Up    Hypertension        Patient is a 74 y.o. female.   History of Present Illness  The patient is here for a 6-month follow-up.    She consulted a nurse practitioner on 07/02/2024 for back pain, who prescribed Celebrex and tizanidine. She experienced relief with Advil, but the pain intensified at night when lying down. She has not yet started physical therapy or the muscle relaxer. She continues to work five days a week.    She is currently on venlafaxine for anxiety, which she reports as being beneficial. She also takes BuSpar as needed. Clonazepam is used in the evenings to manage her anxiety. If she wakes up during the night, she listens to a podcast to help her fall back asleep.    She suspects she may have a rectocele or cystocele, as she can feel a bulge that she can manually reposition. She experiences pressure in the vaginal area while walking, which intensifies by the time she returns home. She occasionally experiences urinary leakage. She was previously under the care of Dr. Tanner, but has not seen anyone since his [facility].    She is taking rosuvastatin 5 mg for cholesterol management and reports no issues with this medication. She does not own a treadmill for exercise.         CHRONIC CONDITIONS      Past Medical History:   Diagnosis Date    ADHD (attention deficit hyperactivity disorder)     Anxiety     Arthritis     Benign essential hypertension with target blood pressure below 140/90     Depression     Generalized anxiety disorder with panic attacks     Generalized anxiety disorder  "with panic attacks 01/07/2019    Major depressive disorder with single episode     Mixed hyperlipidemia     Nutritional anemia 03/01/2019    Orthostatic hypotension 01/03/2022    1/3/2022 Yi Edouard MD  Blood pressures have been controlled by lifestyle.  Blood pressures are now running very low some days.  She feels tired on those days.  The hypotension may be related to poor fluid intake.  Patient was advised to be sure she is getting 8 glasses of fluids a day, and not counting any soda or carbonated beverages.     Post-menopause on HRT (hormone replacement therapy)     Primary insomnia     Rectal pain 04/02/2019    Stroke 2008    R hemispheric Stroke    Vertigo     Vitamin D deficiency        Past Surgical History:   Procedure Laterality Date    AUGMENTATION MAMMAPLASTY  1990    BREAST AUGMENTATION Bilateral 1989    LAPAROSCOPIC ASSISTED VAGINAL HYSTERECTOMY SALPINGO OOPHORECTOMY Bilateral     LAPAROSCOPIC HERNANDEZ PROCEDURE      PARTIAL KNEE ARTHROPLASTY  2015    Partial knee replacement 2 years ago       Family History   Adopted: Yes   Family history unknown: Yes       Social History     Socioeconomic History    Marital status:    Tobacco Use    Smoking status: Never    Smokeless tobacco: Never   Vaping Use    Vaping status: Never Used   Substance and Sexual Activity    Alcohol use: Yes     Alcohol/week: 1.0 standard drink of alcohol     Types: 1 Glasses of wine per week     Comment: socially     Drug use: No    Sexual activity: Not Currently       No Known Allergies    Review of Systems:     Review of Systems    Vital Signs  Vitals:    08/26/24 1336   BP: 120/78   BP Location: Left arm   Patient Position: Sitting   Cuff Size: Adult   Pulse: 78   Temp: 98.6 °F (37 °C)   SpO2: 97%   Weight: 64.3 kg (141 lb 12.8 oz)   Height: 164.9 cm (64.92\")   PainSc: 0-No pain     Body mass index is 23.65 kg/m².  BMI is within normal parameters. No other follow-up for BMI required.          Current Outpatient " Medications:     busPIRone (BUSPAR) 5 MG tablet, Take 1 tablet by mouth 3 (Three) Times a Day As Needed (anxiety)., Disp: 270 tablet, Rfl: 1    Cholecalciferol 25 MCG (1000 UT) tablet, Take 2 tablets by mouth Daily., Disp: , Rfl:     clonazePAM (KlonoPIN) 0.5 MG tablet, Take 1 to 1-1/2 tablets in the evening's as needed, Disp: 60 tablet, Rfl: 2    Coenzyme Q10 (COQ10 PO), Take  by mouth Daily., Disp: , Rfl:     estradiol (CLIMARA) 0.0375 MG/24HR, Place 1 patch on the skin as directed by provider 1 (One) Time Per Week., Disp: 12 patch, Rfl: 1    famotidine (PEPCID) 20 MG tablet, Take 1 tablet by mouth 2 (Two) Times a Day. Take 1 tab 3 times a day for 2 wks then twice a day ongoing (Patient taking differently: Take 1 tablet by mouth 2 (Two) Times a Day As Needed.), Disp: 180 tablet, Rfl: 1    Magnesium 400 MG capsule, Take 1 capsule by mouth Daily. Complex w/1200iu vit d3 & 10.5mg zinc, Disp: 90 capsule, Rfl: 1    NON FORMULARY, Daily. SeroVital - 2 capsules  Multi w/tumeric, theanine, ashwaghanda, rosemary, collagen, cherry, melon, hyraulonic acid, glucosamine, Disp: , Rfl:     rosuvastatin (CRESTOR) 5 MG tablet, Take 0.5 tablets by mouth Every Night., Disp: 46 tablet, Rfl: 1    TiZANidine (ZANAFLEX) 4 MG capsule, Take 1 capsule by mouth 3 (Three) Times a Day As Needed for Muscle Spasms., Disp: 90 capsule, Rfl: 0    venlafaxine XR (EFFEXOR-XR) 75 MG 24 hr capsule, TAKE 1 CAPSULE BY MOUTH EVERY DAY IN THE MORNING, Disp: 90 capsule, Rfl: 1    Physical Exam:    Physical Exam  Vitals and nursing note reviewed.   Constitutional:       Appearance: She is well-developed.   HENT:      Head: Normocephalic.   Eyes:      Conjunctiva/sclera: Conjunctivae normal.      Pupils: Pupils are equal, round, and reactive to light.   Neck:      Thyroid: No thyromegaly.   Cardiovascular:      Rate and Rhythm: Normal rate and regular rhythm.      Heart sounds: Normal heart sounds.   Pulmonary:      Effort: Pulmonary effort is normal.       "Breath sounds: Normal breath sounds. No wheezing.   Musculoskeletal:         General: Normal range of motion.      Cervical back: Normal range of motion and neck supple.   Lymphadenopathy:      Cervical: No cervical adenopathy.   Skin:     General: Skin is warm and dry.   Neurological:      Mental Status: She is alert and oriented to person, place, and time.   Psychiatric:         Attention and Perception: Attention normal.         Mood and Affect: Mood normal.         Speech: Speech normal.         Behavior: Behavior normal.         Thought Content: Thought content normal.         Judgment: Judgment normal.          ACE III MINI        Results Review:    None  Results  Imaging  Bone density test 2022 showed very mild osteopenia.       CMP:  Lab Results   Component Value Date    BUN 17 02/26/2024    CREATININE 0.82 02/26/2024    EGFRIFNONA 80 12/30/2020    BCR 20.7 02/26/2024     02/26/2024    K 4.1 02/26/2024    CO2 26.2 02/26/2024    CALCIUM 9.2 02/26/2024    ALBUMIN 4.2 02/26/2024    BILITOT 0.4 02/26/2024    ALKPHOS 59 02/26/2024    AST 17 02/26/2024    ALT 12 02/26/2024     HbA1c:  No results found for: \"HGBA1C\"  Microalbumin:  Lab Results   Component Value Date    MICROALBUR <1.2 02/26/2024     Lipid Panel  Lab Results   Component Value Date    CHOL 244 (H) 02/26/2024    TRIG 199 (H) 02/26/2024    HDL 61 (H) 02/26/2024     (H) 02/26/2024    AST 17 02/26/2024    ALT 12 02/26/2024       Medication Review: Medications reviewed and noted  Patient Instructions   Problem List Items Addressed This Visit          Cardiac and Vasculature    Mixed hyperlipidemia    Overview     1/2 of the 5 mg rosuvastatin tablet          Relevant Medications    rosuvastatin (CRESTOR) 5 MG tablet    Mild essential hypertension    Overview     Lifestyle control         Relevant Orders    Comprehensive Metabolic Panel    CBC & Differential       Endocrine and Metabolic    Vitamin D deficiency    Relevant Orders    Vitamin " D,25-Hydroxy       Genitourinary and Reproductive     Vaginal prolapse    Relevant Orders    Ambulatory Referral to Gynecology       Mental Health    Generalized anxiety disorder - Primary    Overview     Taking venlafaxine XR 75 mg daily and clonazepam in the evenings.         Relevant Medications    clonazePAM (KlonoPIN) 0.5 MG tablet    venlafaxine XR (EFFEXOR-XR) 75 MG 24 hr capsule    busPIRone (BUSPAR) 5 MG tablet    Major depressive disorder with single episode, in partial remission    Overview     Taking venlafaxine XR 75 mg daily and buspirone as needed and clonazepam in the evenings.         Relevant Medications    venlafaxine XR (EFFEXOR-XR) 75 MG 24 hr capsule    busPIRone (BUSPAR) 5 MG tablet       Musculoskeletal and Injuries    Acute bilateral low back pain without sciatica    Relevant Medications    TiZANidine (ZANAFLEX) 4 MG capsule       Sleep    Primary insomnia    Overview     Taking clonazepam 0.5 mg in the evenings to decrease anxiety.               Diagnosis Plan   1. Generalized anxiety disorder        2. Major depressive disorder with single episode, in partial remission        3. Mixed hyperlipidemia        4. Mild essential hypertension  Comprehensive Metabolic Panel    CBC & Differential      5. Acute bilateral low back pain without sciatica        6. Vaginal prolapse  Ambulatory Referral to Gynecology      7. Primary insomnia        8. Vitamin D deficiency  Vitamin D,25-Hydroxy        Assessment & Plan  1. Low Back pain.  She reported onset of back pain a few weeks ago that had improved, then  worsened after putting a fitted sheet on the bed. She was previously given Celebrex and tizanidine but has been managing with Advil instead.  Physical therapy was recommended by an orthopedist, and she has been doing stretches she found online. She is advised to continue stretching exercises, doing some before even getting out of bed in the mornings.  She may take tizanidine muscle relaxer at night  as needed during severe episodes.  She may also take Advil with food as needed.  Using a moist heat pack on the low back does help relax tight muscles.  She will let us know if it does not continue to improve.    2. Anxiety.  Depression.  She continues to experience anxiety despite taking venlafaxine. She uses buspirone as needed but will start taking it three times a day with meals for the next 2 to 3 weeks along with venlafaxine. If no improvement is observed, genesight  testing will be considered to determine the best medication for her.  She may continue to take clonazepam for anxiety in the evening's.  Exercise and walking also help decrease symptoms of stress, anxiety, and mood.    3.  Vaginal prolapse   she reported feeling a bulge that she can push back up.  No significant pain while walking.  No significant urinary incontinence.  A referral will be made to Dr. Erlinda Page for further evaluation.    4. Hypercholesterolemia.  She is currently taking rosuvastatin 2.5 mg for cholesterol management. A fasting lipid panel will be ordered, and she can go to the lab at her convenience.  Continue to improve low-fat diet and increase regular exercise.    5. Health Maintenance.  She was advised to schedule a mammogram as it has been 2 years since her last one. Recommendations were made for the RSV vaccine now, influenza vaccine in September 2024, and COVID-19 booster in October 2024. A bone density test will be done next year. She was encouraged to maintain physical activity and consider using a treadmill for exercise.    Follow-up  The patient will follow up in 6 months.         Plan of care reviewed with patient at the conclusion of today's visit. Education was provided regarding diagnosis, management, and any prescribed or recommended OTC medications. Patient verbalizes understanding of and agreement with management plan.         08/26/24   13:38 EDT    Patient or patient representative verbalized consent for the  use of Ambient Listening during the visit with  Yi Edouard MD for chart documentation. 8/26/2024  14:38 EDT

## 2024-09-05 DIAGNOSIS — F41.1 GENERALIZED ANXIETY DISORDER: ICD-10-CM

## 2024-09-05 RX ORDER — CLONAZEPAM 0.5 MG/1
TABLET ORAL
Qty: 60 TABLET | Refills: 2 | Status: SHIPPED | OUTPATIENT
Start: 2024-09-05

## 2024-09-05 NOTE — TELEPHONE ENCOUNTER
Rx Refill Note  Requested Prescriptions     Pending Prescriptions Disp Refills    clonazePAM (KlonoPIN) 0.5 MG tablet 60 tablet 2     Sig: Take 1 to 1-1/2 tablets in the evening's as needed      Last refill:  3/19/24 #60/2  Last office visit with prescribing clinician: 8/26/2024   Last telemedicine visit with prescribing clinician: Visit date not found   Next office visit with prescribing clinician: 3/3/2025                         Would you like a call back once the refill request has been completed: [] Yes [] No    If the office needs to give you a call back, can they leave a voicemail: [] Yes [] No    Jeanine Mckay RN  09/05/24, 09:40 EDT

## 2024-10-02 NOTE — TELEPHONE ENCOUNTER
Order for sleep study faxed to [facility] along with discharge summary. The office will call patient's  if able to schedule patient prior to November 5 in [place].    PATIENT CALLED BACK AND I READ THE MESSAGE TO HER THAT YOU PUT IN. SHE SAID NO SHE DOES NOT NEED A REFILL ON THE TIZANIDINE. SHE SAID HER BACK IS OK AND NOT AS BAD AS IT WAS. SHE IS MANAGING PAIN WITH [employer].

## 2024-10-21 RX ORDER — ESTRADIOL 0.04 MG/D
1 PATCH TRANSDERMAL WEEKLY
Qty: 12 PATCH | Refills: 1 | Status: SHIPPED | OUTPATIENT
Start: 2024-10-21

## 2024-10-24 ENCOUNTER — OFFICE VISIT (OUTPATIENT)
Dept: OBSTETRICS AND GYNECOLOGY | Facility: CLINIC | Age: 74
End: 2024-10-24
Payer: MEDICARE

## 2024-10-24 VITALS
BODY MASS INDEX: 24.59 KG/M2 | SYSTOLIC BLOOD PRESSURE: 130 MMHG | HEIGHT: 64 IN | DIASTOLIC BLOOD PRESSURE: 82 MMHG | WEIGHT: 144 LBS

## 2024-10-24 DIAGNOSIS — R15.9 INCONTINENCE OF FECES, UNSPECIFIED FECAL INCONTINENCE TYPE: Primary | ICD-10-CM

## 2024-10-24 DIAGNOSIS — N95.2 VAGINAL ATROPHY: ICD-10-CM

## 2024-10-24 DIAGNOSIS — N81.4 CYSTOCELE WITH PROLAPSE: ICD-10-CM

## 2024-10-24 DIAGNOSIS — N39.41 URGE INCONTINENCE: ICD-10-CM

## 2024-10-24 RX ORDER — ESTRADIOL 0.1 MG/G
CREAM VAGINAL
Qty: 42.5 G | Refills: 2 | Status: SHIPPED | OUTPATIENT
Start: 2024-10-24

## 2024-10-24 NOTE — PROGRESS NOTES
Chief Complaint   Patient presents with    Vaginal Prolapse       Subjective   HPI  Jen Cook is a 74 y.o. female, , who presents for initial evaluation of pelvic organ prolapse.    She states she has pelvic pressure, protrusion from vagina, low back pain, a pulling sensation in her inguinal region, and rectal protrusion.  She states she has experienced this problem for several years.  She describes the severity as severe. She states the symptoms are accentuated by standing, lifting, or straining.      She has been leaking stool for the last few months and it seems to be getting worse.  She has no sensation that she is about to pass stool.      She denies recent strenuous physical exertion that increases intra-abdominal pressure.  Patient notes additional aggravating factors include none and alleviating factors are none.  The patient reports additional symptoms as none.  The patient has not previously been evaluated for pelvic organ prolapse .    Do you feel something protruding from your vagina? yes  Do you have low back pain/pressure in pelvis? yes  Any other symptoms of vaginal dryness, itching, discomfort? No  Do you have frequent Urinary Tract Infections (UTI)? no  Do you have urgency of urination? yes  How many times at night do you get up to urinate? once  Do you leak urine if you can't quite make it to the restroom in time? yes  DO you leak urine if you cough, sneeze, and/or lift something heavy? yes  Do you have trouble emptying bowels/defecating? no  Do you ever have to push inside your vagina to help stool come out? no  Are you sexually active? no  Have you ever been told your bladder, uterus, or rectum is falling? yes  Any other symptoms you want to make you doctor aware of? Yes, describe: incontinent of stool    Additional OB/GYN History   Last Pap : does not get paps anymore  Last Completed Pap Smear       This patient has no relevant Health Maintenance data.          History of abnormal  "Pap smear: no  Exercises Regularly: yes  Tobacco Usage?: No   OB History          4    Para   3    Term   3            AB   1    Living   3         SAB        IAB        Ectopic        Molar        Multiple        Live Births                    The additional following portions of the patient's history were reviewed and updated as appropriate: allergies, current medications, past family history, past medical history, past social history, past surgical history, and problem list.    Review of Systems   All other systems reviewed and are negative.      I have reviewed and agree with the HPI, ROS, and historical information as entered above.     Objective   /82   Ht 162.6 cm (64\")   Wt 65.3 kg (144 lb)   BMI 24.72 kg/m²     Physical Exam  Vitals and nursing note reviewed. Exam conducted with a chaperone present.   Constitutional:       General: She is not in acute distress.     Appearance: Normal appearance. She is well-developed.   HENT:      Head: Normocephalic and atraumatic.   Pulmonary:      Effort: Pulmonary effort is normal. No retractions.   Abdominal:      General: There is no distension.      Palpations: Abdomen is soft. Abdomen is not rigid. There is no mass.      Tenderness: There is no abdominal tenderness. There is no guarding or rebound.      Hernia: No hernia is present.   Genitourinary:     General: Normal vulva.      Pubic Area: No rash.       Labia:         Right: No tenderness or lesion.         Left: No tenderness or lesion.       Urethra: No urethral swelling or urethral lesion.      Vagina: Normal. Prolapsed vaginal walls (Grade 2-3 cystocele with valsalva) present. No vaginal discharge or lesions.      Uterus: Absent.       Adnexa:         Right: No mass or tenderness.          Left: No mass or tenderness.        Rectum: Normal. External hemorrhoid present.   Lymphadenopathy:      Lower Body: No right inguinal adenopathy. No left inguinal adenopathy.   Skin:     General: " Skin is warm and dry.   Neurological:      Mental Status: She is alert and oriented to person, place, and time.   Psychiatric:         Mood and Affect: Mood normal.         Behavior: Behavior normal.         Thought Content: Thought content normal.         Assessment & Plan     Assessment     Problem List Items Addressed This Visit    None  Visit Diagnoses       Incontinence of feces, unspecified fecal incontinence type    -  Primary    Relevant Orders    Ambulatory Referral to Colorectal Surgery (Completed)    Urge incontinence        Relevant Medications    estradiol (ESTRACE VAGINAL) 0.1 MG/GM vaginal cream    Cystocele with prolapse        Relevant Medications    estradiol (ESTRACE VAGINAL) 0.1 MG/GM vaginal cream    Other Relevant Orders    Ambulatory Referral to Gynecologic Urology (Completed)    Vaginal atrophy        Relevant Medications    estradiol (ESTRACE VAGINAL) 0.1 MG/GM vaginal cream              Plan     Follow Up: Return for Annual physical.  referral to URO/GYN at .  Pt with symptomatic cystocele.  Treatment options d/w pt including expt mgt, pelvic floor PT, pessary and surgery.  She declines pessary and is very interested in surgery.  Referral made today.  We discussed a pessary over the interim if she does proceed with surgery.  Pt with new onset fecal incontinence over the last few months.  This seems to be worsening.  She has h/o 10 lb baby with vaginal delivery.  Referral to colorectal.        Erlinda Page MD  10/24/2024

## 2024-11-05 ENCOUNTER — TELEPHONE (OUTPATIENT)
Dept: INTERNAL MEDICINE | Facility: CLINIC | Age: 74
End: 2024-11-05

## 2024-11-05 NOTE — TELEPHONE ENCOUNTER
Caller: Jen Cook    Relationship: Self    Best call back number: [phone]     What was the call regarding: DR RTISH BOWLES PRESCRIBED ESTROGEN CREAM 2GMS A NIGHT FOR 2 WEEKS, TWICE A WEEK AFTER THAT. PATIENT WAS ON THE PATCH. PATIENT WOULD LIKE TO KNOW IF SHE CAN CUT THE PATCH IN HALF TO GET RELIEF. SHE WAS TOLD TO DISCONTINUE WHEN STARTING THE CREAM BUT SHE DOES NOT GET THE SAME RELIEF WITHOUT THE PATCH. PLEASE ADVISE.       Is it okay if the provider responds through MyChart: NO

## 2024-12-02 RX ORDER — VENLAFAXINE HYDROCHLORIDE 75 MG/1
75 CAPSULE, EXTENDED RELEASE ORAL EVERY MORNING
Qty: 90 CAPSULE | Refills: 1 | Status: SHIPPED | OUTPATIENT
Start: 2024-12-02

## 2024-12-20 ENCOUNTER — TELEPHONE (OUTPATIENT)
Dept: INTERNAL MEDICINE | Facility: CLINIC | Age: 74
End: 2024-12-20

## 2024-12-20 NOTE — TELEPHONE ENCOUNTER
Caller: Jen Cook    Relationship: Self    Best call back number: [phone]     What medication are you requesting: SOMETHING TO HELP HER SLEEP    What are your current symptoms: UNABLE TO SLEEP    Have you had these symptoms before:    [] Yes  [x] No    Have you been treated for these symptoms before:   [] Yes  [x] No    If a prescription is needed, what is your preferred pharmacy and phone number: [facility]AC[facility] #[id number] - [place], [place] - [address]  - [phone] [facility] [phone] FX     Additional notes: PATIENT'S DAUGHTER PASSED UNEXPECTEDLY. SHE JUST NEEDS SOMETHING TO HELP HER SLEEP. SHE DOES NOT WANT TO START TEMAZEPAM AGAIN SINCE IT'S SO ADDICTING.

## 2025-01-23 DIAGNOSIS — N95.2 VAGINAL ATROPHY: ICD-10-CM

## 2025-01-23 DIAGNOSIS — N39.41 URGE INCONTINENCE: ICD-10-CM

## 2025-01-23 RX ORDER — ESTRADIOL 0.1 MG/G
CREAM VAGINAL
Qty: 42.5 G | Refills: 2 | Status: SHIPPED | OUTPATIENT
Start: 2025-01-23

## 2025-01-23 RX ORDER — FAMOTIDINE 20 MG/1
20 TABLET, FILM COATED ORAL 2 TIMES DAILY
Qty: 180 TABLET | Refills: 1 | Status: SHIPPED | OUTPATIENT
Start: 2025-01-23

## 2025-01-23 NOTE — TELEPHONE ENCOUNTER
Rx Refill Note  Requested Prescriptions     Pending Prescriptions Disp Refills    famotidine (PEPCID) 20 MG tablet 180 tablet 1     Sig: Take 1 tablet by mouth 2 (Two) Times a Day. Take 1 tab 3 times a day for 2 wks then twice a day ongoing      Last office visit with prescribing clinician: 8/26/2024   Last telemedicine visit with prescribing clinician: Visit date not found   Next office visit with prescribing clinician: 3/3/2025                         Would you like a call back once the refill request has been completed: [] Yes [] No    If the office needs to give you a call back, can they leave a voicemail: [] Yes [] No    Wendy Hamlin MA  01/23/25, 08:11 EST

## 2025-02-06 NOTE — TELEPHONE ENCOUNTER
Medication: : Continuous Glucose Sensor (FreeStyle Irina 3 Sensor)  passed protocol.   Last office visit date: 01/13/25  Next appointment scheduled?: Yes   Number of refills given: 1   Rx Refill Note  Requested Prescriptions     Pending Prescriptions Disp Refills   • clonazePAM (KlonoPIN) 0.5 MG tablet 30 tablet 2     Sig: Take 1 tablet by mouth At Night As Needed for Anxiety.      Last refill:  1/4/22 #30/2  Last office visit with prescribing clinician: 1/3/2022      Next office visit with prescribing clinician: 7/18/2022            Jeanine Mckay RN  04/05/22, 09:36 EDT

## 2025-02-07 ENCOUNTER — TELEPHONE (OUTPATIENT)
Dept: OBSTETRICS AND GYNECOLOGY | Facility: CLINIC | Age: 75
End: 2025-02-07
Payer: MEDICARE

## 2025-02-07 NOTE — TELEPHONE ENCOUNTER
Provider: ABILIO BOWLES MD    Caller: Jen Cook    Relationship to Patient: Self    Pharmacy: [facility]/pharmacy #[id number] - [place], [place] - [address]  - [phone]  - [phone] FX     Phone Number: [phone] (home)       Reason for Call: HOT FLASHES, NOT SLEEPING    When was the patient last seen: 10/23/2024    When did it start: AFTER STOPPING PATCH SHE HAS NOTICED IT     Where is it located: ALL OVER    Characteristics of symptom/severity: SEVERE ENOUGH TO DISRUPT HER SLEEP    Timing- Is it constant or intermittent: INTERMITTENT    What makes it worse: NA    What makes it better: NA    What therapies/medications have you tried: STOPPED THE PATCH AND STARTED USING THE CREAM AS YOU PRESCRIBED.

## 2025-02-07 NOTE — TELEPHONE ENCOUNTER
Patient states she likes using the estradiol cream that was sent in on 01/23/25 but since stopping the patch she has noticed some of her other symptoms are returning. She wanted to know if there was another medication or anything else she can do along with the cream. She reports difficulty sleeping and hot flashes as the main symptoms she is experiencing. I offered her an appt to come in and discuss this with BECKA but she requested a message to be sent to see if she could have it changed without needing to come in due to her work schedule. I let her know I will send a message and BECKA will likely get back to us Monday. She VU

## 2025-02-10 ENCOUNTER — LAB (OUTPATIENT)
Facility: HOSPITAL | Age: 75
End: 2025-02-10
Payer: MEDICARE

## 2025-02-10 DIAGNOSIS — I10 MILD ESSENTIAL HYPERTENSION: ICD-10-CM

## 2025-02-10 DIAGNOSIS — E55.9 VITAMIN D DEFICIENCY: ICD-10-CM

## 2025-02-10 LAB
25(OH)D3 SERPL-MCNC: 29.1 NG/ML (ref 30–100)
BASOPHILS # BLD AUTO: 0.06 10*3/MM3 (ref 0–0.2)
BASOPHILS NFR BLD AUTO: 0.8 % (ref 0–1.5)
DEPRECATED RDW RBC AUTO: 43 FL (ref 37–54)
EOSINOPHIL # BLD AUTO: 0.3 10*3/MM3 (ref 0–0.4)
EOSINOPHIL NFR BLD AUTO: 4.2 % (ref 0.3–6.2)
ERYTHROCYTE [DISTWIDTH] IN BLOOD BY AUTOMATED COUNT: 13.7 % (ref 12.3–15.4)
HCT VFR BLD AUTO: 43.3 % (ref 34–46.6)
HGB BLD-MCNC: 14.7 G/DL (ref 12–15.9)
IMM GRANULOCYTES # BLD AUTO: 0.03 10*3/MM3 (ref 0–0.05)
IMM GRANULOCYTES NFR BLD AUTO: 0.4 % (ref 0–0.5)
LYMPHOCYTES # BLD AUTO: 2.16 10*3/MM3 (ref 0.7–3.1)
LYMPHOCYTES NFR BLD AUTO: 30.6 % (ref 19.6–45.3)
MCH RBC QN AUTO: 29.5 PG (ref 26.6–33)
MCHC RBC AUTO-ENTMCNC: 33.9 G/DL (ref 31.5–35.7)
MCV RBC AUTO: 86.9 FL (ref 79–97)
MONOCYTES # BLD AUTO: 0.56 10*3/MM3 (ref 0.1–0.9)
MONOCYTES NFR BLD AUTO: 7.9 % (ref 5–12)
NEUTROPHILS NFR BLD AUTO: 3.95 10*3/MM3 (ref 1.7–7)
NEUTROPHILS NFR BLD AUTO: 56.1 % (ref 42.7–76)
NRBC BLD AUTO-RTO: 0 /100 WBC (ref 0–0.2)
PLATELET # BLD AUTO: 360 10*3/MM3 (ref 140–450)
PMV BLD AUTO: 9.6 FL (ref 6–12)
RBC # BLD AUTO: 4.98 10*6/MM3 (ref 3.77–5.28)
WBC NRBC COR # BLD AUTO: 7.06 10*3/MM3 (ref 3.4–10.8)

## 2025-02-10 PROCEDURE — 85025 COMPLETE CBC W/AUTO DIFF WBC: CPT

## 2025-02-10 PROCEDURE — 82306 VITAMIN D 25 HYDROXY: CPT

## 2025-02-10 PROCEDURE — 80053 COMPREHEN METABOLIC PANEL: CPT

## 2025-02-10 NOTE — TELEPHONE ENCOUNTER
It looks like we did visit for Prolapse and atrophy and have never discussed HRT or risks.  She would need an appointment to go over that.  We could try to set up a telemedicine visit if that helps.

## 2025-02-11 LAB
ALBUMIN SERPL-MCNC: 4.4 G/DL (ref 3.5–5.2)
ALBUMIN/GLOB SERPL: 1.4 G/DL
ALP SERPL-CCNC: 69 U/L (ref 39–117)
ALT SERPL W P-5'-P-CCNC: 12 U/L (ref 1–33)
ANION GAP SERPL CALCULATED.3IONS-SCNC: 11.6 MMOL/L (ref 5–15)
AST SERPL-CCNC: 20 U/L (ref 1–32)
BILIRUB SERPL-MCNC: 0.4 MG/DL (ref 0–1.2)
BUN SERPL-MCNC: 20 MG/DL (ref 8–23)
BUN/CREAT SERPL: 19.6 (ref 7–25)
CALCIUM SPEC-SCNC: 9.5 MG/DL (ref 8.6–10.5)
CHLORIDE SERPL-SCNC: 101 MMOL/L (ref 98–107)
CO2 SERPL-SCNC: 25.4 MMOL/L (ref 22–29)
CREAT SERPL-MCNC: 1.02 MG/DL (ref 0.57–1)
EGFRCR SERPLBLD CKD-EPI 2021: 57.5 ML/MIN/1.73
GLOBULIN UR ELPH-MCNC: 3.1 GM/DL
GLUCOSE SERPL-MCNC: 80 MG/DL (ref 65–99)
POTASSIUM SERPL-SCNC: 4.8 MMOL/L (ref 3.5–5.2)
PROT SERPL-MCNC: 7.5 G/DL (ref 6–8.5)
SODIUM SERPL-SCNC: 138 MMOL/L (ref 136–145)

## 2025-02-14 ENCOUNTER — TELEPHONE (OUTPATIENT)
Dept: INTERNAL MEDICINE | Facility: CLINIC | Age: 75
End: 2025-02-14
Payer: MEDICARE

## 2025-02-14 NOTE — TELEPHONE ENCOUNTER
Caller: Jen Cook    Relationship: Self    Best call back number: [phone]     Caller requesting test results: PATIENT    What test was performed: BLOOD WORK    When was the test performed: 2/10/25    Where was the test performed: [facility]    Additional notes: PLEASE CALL TO DISCUSS    SHE ALSO WANTED TO KNOW IF THERE WERE SUPPOSED TO HAVE BEEN LABS REGARDING HER CHOLESTEROL.

## 2025-02-14 NOTE — TELEPHONE ENCOUNTER
Please call Ms. Cook and let her know that her CBC is normal and shows no signs of anemia.  Her liver function and electrolytes are all normal.  Her kidney function shows slightly decreased.  Please ensure that she is drinking an adequate amount of water daily.  Her vitamin D levels are slightly low and she should be taking a daily OTC vitamin D supplement.  A lipid panel was not ordered.

## 2025-02-26 ENCOUNTER — TELEPHONE (OUTPATIENT)
Dept: OBSTETRICS AND GYNECOLOGY | Facility: CLINIC | Age: 75
End: 2025-02-26
Payer: MEDICARE

## 2025-02-26 NOTE — TELEPHONE ENCOUNTER
Caller: Jen Cook    Relationship: Self    Best call back number: [phone]    What is the best time to reach you: ANY    Who are you requesting to speak with (clinical staff, provider,  specific staff member): CLINICAL       What was the call regarding: PT IS WANTING TO KNOW IF SHE CAN STOP USING     estradiol (ESTRACE) 0.1 MG/GM vaginal cream   AND GO BACK TO PATCH AFTER BLADDER SURGERY. REQUESTING A CALL TO DISCUSS

## 2025-02-26 NOTE — TELEPHONE ENCOUNTER
Jen was switched from climara patch to estrace vaginal cream in January 2025. She reports it is not helping her symptoms, is messy and runs out, and she doesn't feel good on it at all. She is scheduled to have her bladder surgery in two weeks. She would like to switch back to the patch as soon as possible. Message sent to Dr. Page to confirm.

## 2025-02-27 NOTE — TELEPHONE ENCOUNTER
Called patient. Informed her that Dr. Page would like her to make an appointment to discuss. Patient v/u and agreed. Appointment scheduled.

## 2025-02-27 NOTE — TELEPHONE ENCOUNTER
Dr. Page would like patient to make an appointment to discuss restarting the patch. She recommends staying on the vaginal estrace cream until she has her surgery. She said the pt could do telehealth visit with her if she would like.   Patient discharged by [facility] from office.

## 2025-03-03 ENCOUNTER — OFFICE VISIT (OUTPATIENT)
Dept: INTERNAL MEDICINE | Facility: CLINIC | Age: 75
End: 2025-03-03
Payer: MEDICARE

## 2025-03-03 VITALS
OXYGEN SATURATION: 99 % | WEIGHT: 142 LBS | HEART RATE: 76 BPM | TEMPERATURE: 98.2 F | BODY MASS INDEX: 24.24 KG/M2 | SYSTOLIC BLOOD PRESSURE: 126 MMHG | HEIGHT: 64 IN | DIASTOLIC BLOOD PRESSURE: 90 MMHG

## 2025-03-03 DIAGNOSIS — Z00.00 MEDICARE ANNUAL WELLNESS VISIT, SUBSEQUENT: Primary | ICD-10-CM

## 2025-03-03 DIAGNOSIS — E55.9 VITAMIN D DEFICIENCY: ICD-10-CM

## 2025-03-03 DIAGNOSIS — H01.02B SQUAMOUS BLEPHARITIS OF UPPER AND LOWER EYELIDS OF BOTH EYES: ICD-10-CM

## 2025-03-03 DIAGNOSIS — Z23 NEED FOR IMMUNIZATION AGAINST INFLUENZA: ICD-10-CM

## 2025-03-03 DIAGNOSIS — I10 MILD ESSENTIAL HYPERTENSION: ICD-10-CM

## 2025-03-03 DIAGNOSIS — Z12.31 BREAST CANCER SCREENING BY MAMMOGRAM: ICD-10-CM

## 2025-03-03 DIAGNOSIS — H01.02A SQUAMOUS BLEPHARITIS OF UPPER AND LOWER EYELIDS OF BOTH EYES: ICD-10-CM

## 2025-03-03 DIAGNOSIS — F32.4 MAJOR DEPRESSIVE DISORDER WITH SINGLE EPISODE, IN PARTIAL REMISSION: ICD-10-CM

## 2025-03-03 DIAGNOSIS — K21.9 GASTROESOPHAGEAL REFLUX DISEASE WITHOUT ESOPHAGITIS: Chronic | ICD-10-CM

## 2025-03-03 DIAGNOSIS — E78.2 MIXED HYPERLIPIDEMIA: ICD-10-CM

## 2025-03-03 DIAGNOSIS — F41.1 GENERALIZED ANXIETY DISORDER: ICD-10-CM

## 2025-03-03 DIAGNOSIS — N95.9 MENOPAUSAL AND POSTMENOPAUSAL DISORDER: ICD-10-CM

## 2025-03-03 DIAGNOSIS — F43.21 GRIEF: ICD-10-CM

## 2025-03-03 DIAGNOSIS — N81.10 VAGINAL PROLAPSE: ICD-10-CM

## 2025-03-03 PROCEDURE — 3080F DIAST BP >= 90 MM HG: CPT | Performed by: INTERNAL MEDICINE

## 2025-03-03 PROCEDURE — 1126F AMNT PAIN NOTED NONE PRSNT: CPT | Performed by: INTERNAL MEDICINE

## 2025-03-03 PROCEDURE — 1159F MED LIST DOCD IN RCRD: CPT | Performed by: INTERNAL MEDICINE

## 2025-03-03 PROCEDURE — 1160F RVW MEDS BY RX/DR IN RCRD: CPT | Performed by: INTERNAL MEDICINE

## 2025-03-03 PROCEDURE — G0439 PPPS, SUBSEQ VISIT: HCPCS | Performed by: INTERNAL MEDICINE

## 2025-03-03 PROCEDURE — 3074F SYST BP LT 130 MM HG: CPT | Performed by: INTERNAL MEDICINE

## 2025-03-03 RX ORDER — ACETAMINOPHEN 160 MG
2000 TABLET,DISINTEGRATING ORAL DAILY
Start: 2025-03-03 | End: 2026-03-03

## 2025-03-03 RX ORDER — FAMOTIDINE 20 MG/1
20 TABLET, FILM COATED ORAL 2 TIMES DAILY
Qty: 180 TABLET | Refills: 3 | Status: SHIPPED | OUTPATIENT
Start: 2025-03-03

## 2025-03-03 RX ORDER — ROSUVASTATIN CALCIUM 5 MG/1
2.5 TABLET, COATED ORAL NIGHTLY
Qty: 46 TABLET | Refills: 3 | Status: SHIPPED | OUTPATIENT
Start: 2025-03-03

## 2025-03-03 RX ORDER — CLONAZEPAM 0.5 MG/1
TABLET ORAL
Qty: 60 TABLET | Refills: 2 | Status: SHIPPED | OUTPATIENT
Start: 2025-03-03

## 2025-03-03 RX ORDER — VENLAFAXINE HYDROCHLORIDE 75 MG/1
75 CAPSULE, EXTENDED RELEASE ORAL EVERY MORNING
Qty: 90 CAPSULE | Refills: 3 | Status: SHIPPED | OUTPATIENT
Start: 2025-03-03

## 2025-03-03 NOTE — PATIENT INSTRUCTIONS
Problem List Items Addressed This Visit          Cardiac and Vasculature    Mixed hyperlipidemia    Overview     1/2 of the 5 mg rosuvastatin tablet          Relevant Medications    rosuvastatin (CRESTOR) 5 MG tablet    Other Relevant Orders    Lipid Panel    Mild essential hypertension    Overview     Lifestyle control            Endocrine and Metabolic    Vitamin D deficiency       Gastrointestinal Abdominal     Gastroesophageal reflux disease without esophagitis (Chronic)    Overview     Taking 20 mg famotidine once or twice a day.               Relevant Medications    famotidine (PEPCID) 20 MG tablet       Genitourinary and Reproductive     Menopausal and postmenopausal disorder    Relevant Orders    DEXA Bone Density Axial    Vaginal prolapse    Overview     Dr. Navarro planing surgery [facility]            Mental Health    Generalized anxiety disorder    Overview     Taking venlafaxine XR 75 mg daily and clonazepam in the evenings.         Relevant Medications    busPIRone (BUSPAR) 5 MG tablet    venlafaxine XR (EFFEXOR-XR) 75 MG 24 hr capsule    clonazePAM (KlonoPIN) 0.5 MG tablet    Major depressive disorder with single episode, in partial remission    Overview     Taking venlafaxine XR 75 mg daily and buspirone as needed and clonazepam in the evenings.         Relevant Medications    busPIRone (BUSPAR) 5 MG tablet    venlafaxine XR (EFFEXOR-XR) 75 MG 24 hr capsule    Other Relevant Orders    Ambulatory Referral to Behavioral Health    Grief    Relevant Orders    Ambulatory Referral to Behavioral Health     Other Visit Diagnoses       Medicare annual wellness visit, subsequent    -  Primary    Squamous blepharitis of upper and lower eyelids of both eyes        Breast cancer screening by mammogram        Relevant Orders    Mammo Screening Digital Tomosynthesis Bilateral With CAD    Need for immunization against influenza              Exercising to Stay Healthy  To become healthy and stay healthy, it is  recommended that you do moderate-intensity and vigorous-intensity exercise. You can tell that you are exercising at a moderate intensity if your heart starts beating faster and you start breathing faster but can still hold a conversation. You can tell that you are exercising at a vigorous intensity if you are breathing much harder and faster and cannot hold a conversation while exercising.  How can exercise benefit me?  Exercising regularly is important. It has many health benefits, such as:  Improving overall fitness, flexibility, and endurance.  Increasing bone density.  Helping with weight control.  Decreasing body fat.  Increasing muscle strength and endurance.  Reducing stress and tension, anxiety, depression, or anger.  Improving overall health.  What guidelines should I follow while exercising?  Before you start a new exercise program, talk with your health care provider.  Do not exercise so much that you hurt yourself, feel dizzy, or get very short of breath.  Wear comfortable clothes and wear shoes with good support.  Drink plenty of water while you exercise to prevent dehydration or heat stroke.  Work out until your breathing and your heartbeat get faster (moderate intensity).  How often should I exercise?  Choose an activity that you enjoy, and set realistic goals. Your health care provider can help you make an activity plan that is individually designed and works best for you.  Exercise regularly as told by your health care provider. This may include:  Doing strength training two times a week, such as:  Lifting weights.  Using resistance bands.  Push-ups.  Sit-ups.  Yoga.  Doing a certain intensity of exercise for a given amount of time. Choose from these options:  A total of 150 minutes of moderate-intensity exercise every week.  A total of 75 minutes of vigorous-intensity exercise every week.  A mix of moderate-intensity and vigorous-intensity exercise every week.  Children, pregnant women, people who  have not exercised regularly, people who are overweight, and older adults may need to talk with a health care provider about what activities are safe to perform. If you have a medical condition, be sure to talk with your health care provider before you start a new exercise program.  What are some exercise ideas?  Moderate-intensity exercise ideas include:  Walking 1 mile (1.6 km) in about 15 minutes.  Biking.  Hiking.  Golfing.  Dancing.  Water aerobics.  Vigorous-intensity exercise ideas include:  Walking 4.5 miles (7.2 km) or more in about 1 hour.  Jogging or running 5 miles (8 km) in about 1 hour.  Biking 10 miles (16.1 km) or more in about 1 hour.  Lap swimming.  Roller-skating or in-line skating.  Cross-country skiing.  Vigorous competitive sports, such as football, basketball, and soccer.  Jumping rope.  Aerobic dancing.  What are some everyday activities that can help me get exercise?  Yard work, such as:  Pushing a .  Raking and bagging leaves.  Washing your car.  Pushing a stroller.  Shoveling snow.  Gardening.  Washing windows or floors.  How can I be more active in my day-to-day activities?  Use stairs instead of an elevator.  Take a walk during your lunch break.  If you drive, park your car farther away from your work or school.  If you take public transportation, get off one stop early and walk the rest of the way.  Stand up or walk around during all of your indoor phone calls.  Get up, stretch, and walk around every 30 minutes throughout the day.  Enjoy exercise with a friend. Support to continue exercising will help you keep a regular routine of activity.  Where to find more information  You can find more information about exercising to stay healthy from:  [place] Department of Health and Human Services: www.hhs.gov  Centers for Disease Control and Prevention (CDC): www.cdc.gov  Summary  Exercising regularly is important. It will improve your overall fitness, flexibility, and  endurance.  Regular exercise will also improve your overall health. It can help you control your weight, reduce stress, and improve your bone density.  Do not exercise so much that you hurt yourself, feel dizzy, or get very short of breath.  Before you start a new exercise program, talk with your health care provider.  This information is not intended to replace advice given to you by your health care provider. Make sure you discuss any questions you have with your health care provider.  Document Revised: 04/15/2022 Document Reviewed: 04/15/2022  [employer] Patient Education © 2023 [employer]

## 2025-03-03 NOTE — PROGRESS NOTES
Subjective   The ABCs of the Annual Wellness Visit  Medicare Wellness Visit      Jen MARION May is a 74 y.o. patient who presents for a Medicare Wellness Visit.    The following portions of the patient's history were reviewed and   updated as appropriate: allergies, current medications, past family history, past medical history, past social history, past surgical history, and problem list.    Compared to one year ago, the patient's physical   health is the same.  Compared to one year ago, the patient's mental   health is worse.    Recent Hospitalizations:  She was not admitted to the hospital during the last year.     Current Medical Providers:  Patient Care Team:  Yi Edouard MD as PCP - General (Internal Medicine)  Pierre Carreno MD as Consulting Physician (Gastroenterology)  Víctor Tanner MD (Inactive) as Consulting Physician (Gynecology)  Leonel Ren MD as Consulting Physician (Orthopedic Surgery)    Outpatient Medications Prior to Visit   Medication Sig Dispense Refill    busPIRone (BUSPAR) 5 MG tablet Take 1 tablet by mouth 3 (Three) Times a Day As Needed (anxiety). 270 tablet 1    clonazePAM (KlonoPIN) 0.5 MG tablet Take 1 to 1-1/2 tablets in the evening's as needed 60 tablet 2    Coenzyme Q10 (COQ10 PO) Take  by mouth Daily.      estradiol (ESTRACE) 0.1 MG/GM vaginal cream INSERT 2 GRAMS VAGINALLY NIGHTLY X 2 WEEKS, THEN INSERT 2 GRAMS VAGINALLY TWICE WEEKLY 42.5 g 2    famotidine (PEPCID) 20 MG tablet Take 1 tablet by mouth 2 (Two) Times a Day. Take 1 tab 3 times a day for 2 wks then twice a day ongoing 180 tablet 1    Magnesium 400 MG capsule Take 1 capsule by mouth Daily. Complex w/1200iu vit d3 & 10.5mg zinc 90 capsule 1    rosuvastatin (CRESTOR) 5 MG tablet Take 0.5 tablets by mouth Every Night. 46 tablet 1    TiZANidine (ZANAFLEX) 4 MG capsule Take 1 capsule by mouth 3 (Three) Times a Day As Needed for Muscle Spasms. 90 capsule 0    venlafaxine XR (EFFEXOR-XR) 75 MG 24 hr  capsule TAKE 1 CAPSULE BY MOUTH EVERY DAY IN THE MORNING 90 capsule 1    estradiol (CLIMARA) 0.0375 MG/24HR Place 1 patch on the skin as directed by provider 1 (One) Time Per Week. 12 patch 1    NON FORMULARY Daily. SeroVital - 2 capsules    Multi w/tumeric, theanine, ashwaghanda, rosemary, collagen, cherry, melon, hyraulonic acid, glucosamine       No facility-administered medications prior to visit.     No opioid medication identified on active medication list. I have reviewed chart for other potential  high risk medication/s and harmful drug interactions in the elderly.      Aspirin is not on active medication list.  Aspirin use is not indicated based on review of current medical condition/s. Risk of harm outweighs potential benefits.  .    Patient Active Problem List   Diagnosis    Primary insomnia    Generalized anxiety disorder    Osteoarthritis    Other fatigue    Vitamin D deficiency    Hip pain, bilateral    Mixed hyperlipidemia    ADD (attention deficit disorder) without hyperactivity    Stress and adjustment reaction    Major depressive disorder with single episode, in partial remission    Allergic rhinitis    BMI 24.0-24.9, adult    History of cerebrovascular disease    Menopausal and postmenopausal disorder    Benign paroxysmal positional vertigo due to bilateral vestibular disorder    Mild essential hypertension    Tinnitus, left ear    Gastroesophageal reflux disease without esophagitis    Midline cystocele    Bilateral hearing loss    Osteopenia of multiple sites    Acute bilateral low back pain without sciatica    Pain of right breast    Chronic pain of right knee    Female pattern hair loss    Acute nonintractable headache    Solar lentigo    Sacroiliac joint pain    Vaginal prolapse     Advance Care Planning Advance Directive is not on file.  ACP discussion was held with the patient during this visit. Patient does not have an advance directive, information provided.            Objective   Vitals:  "   25 1329   BP: 126/90   BP Location: Left arm   Patient Position: Sitting   Cuff Size: Adult   Pulse: 76   Temp: 98.2 °F (36.8 °C)   TempSrc: Infrared   SpO2: 99%   Weight: 64.4 kg (142 lb)   Height: 162.6 cm (64.02\")   PainSc: 0-No pain       Estimated body mass index is 24.36 kg/m² as calculated from the following:    Height as of this encounter: 162.6 cm (64.02\").    Weight as of this encounter: 64.4 kg (142 lb).    BMI is within normal parameters. No other follow-up for BMI required.           Does the patient have evidence of cognitive impairment? No                                                                                                Health  Risk Assessment    Smoking Status:  Social History     Tobacco Use   Smoking Status Never   Smokeless Tobacco Never     Alcohol Consumption:  Social History     Substance and Sexual Activity   Alcohol Use Not Currently    Alcohol/week: 1.0 standard drink of alcohol    Comment: socially        Fall Risk Screen  STEADI Fall Risk Assessment was completed, and patient is at LOW risk for falls.Assessment completed on:3/3/2025    Depression Screening   Little interest or pleasure in doing things? Not at all   Feeling down, depressed, or hopeless? Not at all   PHQ-2 Total Score 0      Health Habits and Functional and Cognitive Screenin/24/2025     8:46 AM   Functional & Cognitive Status   Do you have difficulty preparing food and eating? No    Do you have difficulty bathing yourself, getting dressed or grooming yourself? No    Do you have difficulty using the toilet? No    Do you have difficulty moving around from place to place? No    Do you have trouble with steps or getting out of a bed or a chair? No    Current Diet Well Balanced Diet    Dental Exam Up to date    Eye Exam Up to date    Exercise (times per week) 1 times per week    Current Exercises Include Walking    Do you need help using the phone?  No    Are you deaf or do you have serious " difficulty hearing?  No    Do you need help to go to places out of walking distance? No    Do you need help shopping? No    Do you need help preparing meals?  No    Do you need help with housework?  No    Do you need help with laundry? No    Do you need help taking your medications? No    Do you need help managing money? No    Do you ever drive or ride in a car without wearing a seat belt? No    Have you felt unusual stress, anger or loneliness in the last month? Yes    Who do you live with? Alone    If you need help, do you have trouble finding someone available to you? No    Have you been bothered in the last four weeks by sexual problems? No    Do you have difficulty concentrating, remembering or making decisions? No        Patient-reported           Age-appropriate Screening Schedule:  Refer to the list below for future screening recommendations based on patient's age, sex and/or medical conditions. Orders for these recommended tests are listed in the plan section. The patient has been provided with a written plan.    Health Maintenance List  Health Maintenance   Topic Date Due    ZOSTER VACCINE (1 of 2) Never done    INFLUENZA VACCINE  07/01/2024    RSV Vaccine - Adults (1 - 1-dose 75+ series) Never done    COVID-19 Vaccine (5 - 2024-25 season) 09/01/2024    LIPID PANEL  02/26/2025    ANNUAL WELLNESS VISIT  02/28/2025    COLORECTAL CANCER SCREENING  04/01/2025    DXA SCAN  08/15/2025 (Originally 5/12/2024)    TDAP/TD VACCINES (2 - Td or Tdap) 02/13/2027    HEPATITIS C SCREENING  Completed    Pneumococcal Vaccine 50+  Completed    MAMMOGRAM  Discontinued                                                                                                                                                CMS Preventative Services Quick Reference  Risk Factors Identified During Encounter  Depression/Dysphoria: Current medication is effective, no change recommended and Referral to Mental Health specialist    The above  risks/problems have been discussed with the patient.  Pertinent information has been shared with the patient in the After Visit Summary.  An After Visit Summary and PPPS were made available to the patient       The patient presents for an annual wellness visit.    She has been experiencing significant stress due to the recent loss of her daughter, which has led to sleep disturbances and episodes of diarrhea at night. She is seeking a referral to a psychologist for further support. She has been working 5 days a week and has been trying to stay physically active. She has been raising her granddaughter because she was having many issues. She called child protective services, and the other 2 children are with her niece and they have been there for 10 years. She has been questioning herself if she could have done more for her children. She has been managing her symptoms with clonazepam and buspirone, which she reports as effective. She does not require additional clonazepam at this time. She is also on a long-term regimen of venlafaxine 75 mg.    She has been under the care of Dr. Page, who prescribed Premarin cream, which she tolerates well. She has been experiencing persistent hot flashes and plans to continue using Premarin cream.    She has been diagnosed with a rectocele by Dr. Navarro, who has recommended surgical repair. She is scheduled for a colonoscopy on Friday.    She has been experiencing pruritus in her eyes, diagnosed as blepharitis by Dr. Garcia. Despite using Jeremy's baby shampoo and Renew drops as recommended, she continues to experience discomfort. She has been using Flonase for nasal symptoms.    She has been experiencing heartburn, which she attributes to stress. She has not yet completed her mammogram. She has been taking vitamin D 2000 IU daily. She has been maintaining hydration by keeping water at her workplace. She does not experience dysuria. She has been monitoring her blood pressure at  "home, which typically reads around 126/70, although it was recorded as 156 systolic during a recent visit to Dr. Navarro. She has been seeing her eye doctor regularly, with the last visit in October. She has also been seeing her dentist regularly. She has not seen a dermatologist recently.    She has been taking rosuvastatin, half tablet at night.    Supplemental Information  She has been seeing an orthopedic doctor and was sent to physical therapy for her back, which helped a lot. Her back is better now. She has not been taking tizanidine.    MEDICATIONS  Current: Clonazepam, buspirone, venlafaxine, rosuvastatin, Premarin cream, famotidine, Flonase, Jeremy's baby shampoo, Renew drops, vitamin D.  Discontinued: Tizanidine.    IMMUNIZATIONS  She is up to date on the pneumonia vaccine and the tetanus, diphtheria, and whooping cough vaccines.          Objective   Vital Signs:  /90 (BP Location: Left arm, Patient Position: Sitting, Cuff Size: Adult)   Pulse 76   Temp 98.2 °F (36.8 °C) (Infrared)   Ht 162.6 cm (64.02\")   Wt 64.4 kg (142 lb)   SpO2 99%   BMI 24.36 kg/m²   Physical Exam  Vitals and nursing note reviewed.   Constitutional:       Appearance: She is well-developed.   HENT:      Head: Normocephalic.   Eyes:      Conjunctiva/sclera: Conjunctivae normal.      Pupils: Pupils are equal, round, and reactive to light.   Neck:      Thyroid: No thyromegaly.   Cardiovascular:      Rate and Rhythm: Normal rate and regular rhythm.      Heart sounds: Normal heart sounds.   Pulmonary:      Effort: Pulmonary effort is normal.      Breath sounds: Normal breath sounds. No wheezing.   Chest:   Breasts:     Right: No inverted nipple, mass, nipple discharge, skin change or tenderness.      Left: No inverted nipple, mass, nipple discharge, skin change or tenderness.   Abdominal:      General: Bowel sounds are normal.      Palpations: Abdomen is soft.      Tenderness: There is no abdominal tenderness. "   Musculoskeletal:         General: No tenderness. Normal range of motion.      Cervical back: Normal range of motion and neck supple.   Lymphadenopathy:      Cervical: No cervical adenopathy.   Skin:     General: Skin is warm and dry.      Findings: No rash.   Neurological:      Mental Status: She is alert and oriented to person, place, and time.      Cranial Nerves: No cranial nerve deficit.      Sensory: No sensory deficit.      Coordination: Coordination normal.      Gait: Gait normal.   Psychiatric:         Attention and Perception: Attention normal.         Mood and Affect: Mood normal.         Speech: Speech normal.         Behavior: Behavior normal.         Thought Content: Thought content normal.         Cognition and Memory: Cognition normal.         Judgment: Judgment normal.         Breast exam was performed.  Skin on her back appears normal.    Vital Signs  Blood pressure is 126 systolic.    The following data was reviewed by: Yi Edouard MD on 03/03/2025:  Data reviewed : Consultant notes GYN  CMP          2/10/2025    10:55   CMP   Glucose 80    BUN 20    Creatinine 1.02    EGFR 57.5    Sodium 138    Potassium 4.8    Chloride 101    Calcium 9.5    Total Protein 7.5    Albumin 4.4    Globulin 3.1    Total Bilirubin 0.4    Alkaline Phosphatase 69    AST (SGOT) 20    ALT (SGPT) 12    Albumin/Globulin Ratio 1.4    BUN/Creatinine Ratio 19.6    Anion Gap 11.6      CBC          2/10/2025    10:55   CBC   WBC 7.06    RBC 4.98    Hemoglobin 14.7    Hematocrit 43.3    MCV 86.9    MCH 29.5    MCHC 33.9    RDW 13.7    Platelets 360      CBC w/diff          2/10/2025    10:55   CBC w/Diff   WBC 7.06    RBC 4.98    Hemoglobin 14.7    Hematocrit 43.3    MCV 86.9    MCH 29.5    MCHC 33.9    RDW 13.7    Platelets 360    Neutrophil Rel % 56.1    Immature Granulocyte Rel % 0.4    Lymphocyte Rel % 30.6    Monocyte Rel % 7.9    Eosinophil Rel % 4.2    Basophil Rel % 0.8            Results  Laboratory Studies  Blood  sugar was 80. Liver enzymes were all normal. Kidney function was slightly decreased with a filtration rate of less than 60, usually 70 to 75. Vitamin D level was 29, previously 36 and 40. White blood cells, red blood cells, platelets, and all types of white blood cells were normal.    Imaging  Bone density test in 2022 showed very mild osteopenia, not osteoporosis.              Assessment and Plan Additional age appropriate preventative wellness advice topics were discussed during today's preventative wellness exam(some topics already addressed during AWV portion of the note above):    Physical Activity: Advised cardiovascular activity 150 minutes per week as tolerated. (example brisk walk for 30 minutes, 5 days a week).     Nutrition: Discussed nutrition plan with patient. Information shared in after visit summary. Goal is for a well balanced diet to enhance overall health.     Healthy Weight: Discussed current and goal BMI with patient. Steps to attain this goal discussed. Information shared in after visit summary.       1. Depression and grief.  She has been experiencing depression and grief after the loss of her daughter. She will continue her job, which helps keep her busy, and try to do some walking and other physical activities to help manage stress. She will continue her current dose of venlafaxine 75 mg daily and buspirone as needed. A referral to behavioral health with [facility] for counseling and help with grief has been placed.    2. Mixed hyperlipidemia.  She is advised to continue improving her low-fat, healthy diet and try to get more walking and exercise. She will continue taking half a tablet of rosuvastatin nightly. A cholesterol test will be done today to monitor her levels.    3. Mild essential hypertension.  Her blood pressure readings were slightly elevated, likely due to stress. She is advised to continue avoiding salt in her diet and try to get more physical activity.    4. Vaginal  prolapse.  She has seen Dr. Erlinda Page and Dr. Navarro for evaluation and will have surgery in the near future.    5. Blepharitis of both eyes.  She is encouraged to continue using Jeremy's baby shampoo as needed and do a warm compress on the eyes at least once a day. She will follow up with Dr. Garcia.    6. Gastroesophageal reflux disease (GERD).  She is advised to continue avoiding lying down after eating and to eat dinner as early as possible. She will continue taking famotidine twice a day.    7. Health maintenance.  Her blood sugar was 80. Liver enzymes were all normal. Kidney function was slightly decreased with a filtration rate of less than 60, usually 70 to 75. Vitamin D level was 29, previously 36 and 40. White blood cells, red blood cells, platelets, and all types of white blood cells were normal. Bone density test in 2022 showed very mild osteopenia, not osteoporosis. She is encouraged to get the influenza vaccine this coming fall. A mammogram and DEXA scan have been ordered. She has a colonoscopy scheduled for Friday. She is advised to continue taking vitamin D 2000 IU daily.    Follow-up  The patient will follow up in 6 months.            Follow Up   No follow-ups on file.  Patient was given instructions and counseling regarding her condition or for health maintenance advice. Please see specific information pulled into the AVS if appropriate.  Patient or patient representative verbalized consent for the use of Ambient Listening during the visit with  Yi Edouard MD for chart documentation. 3/3/2025  14:26 EST

## 2025-04-23 ENCOUNTER — OFFICE VISIT (OUTPATIENT)
Age: 75
End: 2025-04-23
Payer: MEDICARE

## 2025-04-23 DIAGNOSIS — F41.1 GENERALIZED ANXIETY DISORDER: ICD-10-CM

## 2025-04-23 DIAGNOSIS — F33.1 MAJOR DEPRESSIVE DISORDER, RECURRENT EPISODE, MODERATE: Primary | ICD-10-CM
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
## 2025-06-08 LAB
NCCN CRITERIA FLAG: NORMAL
TYRER CUZICK SCORE: 3.2

## 2025-06-09 ENCOUNTER — HOSPITAL ENCOUNTER (OUTPATIENT)
Dept: MAMMOGRAPHY | Facility: HOSPITAL | Age: 75
Discharge: HOME | End: 2025-06-09
Admitting: INTERNAL MEDICINE
Payer: MEDICARE

## 2025-06-09 DIAGNOSIS — Z12.31 BREAST CANCER SCREENING BY MAMMOGRAM: ICD-10-CM

## 2025-06-09 PROCEDURE — 77063 BREAST TOMOSYNTHESIS BI: CPT

## 2025-06-09 PROCEDURE — 77067 SCR MAMMO BI INCL CAD: CPT

## 2025-06-19 ENCOUNTER — HOSPITAL ENCOUNTER (OUTPATIENT)
Facility: HOSPITAL | Age: 75
Discharge: HOME | End: 2025-06-19
Payer: MEDICARE

## 2025-06-19 ENCOUNTER — LAB (OUTPATIENT)
Facility: HOSPITAL | Age: 75
End: 2025-06-19
Payer: MEDICARE

## 2025-06-19 DIAGNOSIS — E78.2 MIXED HYPERLIPIDEMIA: ICD-10-CM

## 2025-06-19 DIAGNOSIS — R92.8 ABNORMAL MAMMOGRAM: ICD-10-CM

## 2025-06-19 LAB
CHOLEST SERPL-MCNC: 249 MG/DL (ref 0–200)
HDLC SERPL-MCNC: 57 MG/DL (ref 40–60)
LDLC SERPL CALC-MCNC: 160 MG/DL (ref 0–100)
LDLC/HDLC SERPL: 2.74 {RATIO}
TRIGL SERPL-MCNC: 178 MG/DL (ref 0–150)
VLDLC SERPL-MCNC: 32 MG/DL (ref 5–40)

## 2025-06-19 PROCEDURE — 80061 LIPID PANEL: CPT

## 2025-06-19 PROCEDURE — 77065 DX MAMMO INCL CAD UNI: CPT

## 2025-06-19 PROCEDURE — G0279 TOMOSYNTHESIS, MAMMO: HCPCS

## 2025-07-30 RX ORDER — LANOLIN ALCOHOL/MO/W.PET/CERES
1 CREAM (GRAM) TOPICAL DAILY
Qty: 90 TABLET | Refills: 1 | Status: SHIPPED | OUTPATIENT
Start: 2025-07-30

## 2025-08-08 RX ORDER — ESTRADIOL 0.04 MG/D
1 PATCH TRANSDERMAL
Qty: 12 PATCH | Refills: 1 | Status: SHIPPED | OUTPATIENT
Start: 2025-08-08

## 2025-08-11 RX ORDER — ROSUVASTATIN CALCIUM 5 MG/1
5 TABLET, COATED ORAL NIGHTLY
Qty: 90 TABLET | Refills: 3 | Status: SHIPPED | OUTPATIENT
Start: 2025-08-11

## 2025-08-26 DIAGNOSIS — F41.1 GENERALIZED ANXIETY DISORDER: ICD-10-CM

## 2025-08-26 RX ORDER — CLONAZEPAM 0.5 MG/1
TABLET ORAL
Qty: 60 TABLET | Refills: 2 | Status: SHIPPED | OUTPATIENT
Start: 2025-08-26